# Patient Record
Sex: MALE | Race: OTHER | HISPANIC OR LATINO
[De-identification: names, ages, dates, MRNs, and addresses within clinical notes are randomized per-mention and may not be internally consistent; named-entity substitution may affect disease eponyms.]

---

## 2017-01-04 ENCOUNTER — APPOINTMENT (OUTPATIENT)
Dept: CARDIOLOGY | Facility: CLINIC | Age: 62
End: 2017-01-04

## 2017-01-25 ENCOUNTER — APPOINTMENT (OUTPATIENT)
Dept: CARDIOLOGY | Facility: CLINIC | Age: 62
End: 2017-01-25

## 2017-01-25 ENCOUNTER — OUTPATIENT (OUTPATIENT)
Dept: OUTPATIENT SERVICES | Facility: HOSPITAL | Age: 62
LOS: 1 days | End: 2017-01-25
Payer: MEDICAID

## 2017-01-25 DIAGNOSIS — Z00.8 ENCOUNTER FOR OTHER GENERAL EXAMINATION: ICD-10-CM

## 2017-01-25 PROCEDURE — A9500: CPT

## 2017-01-25 PROCEDURE — 93018 CV STRESS TEST I&R ONLY: CPT

## 2017-01-25 PROCEDURE — 78452 HT MUSCLE IMAGE SPECT MULT: CPT | Mod: 26

## 2017-01-25 PROCEDURE — 78452 HT MUSCLE IMAGE SPECT MULT: CPT

## 2017-01-25 PROCEDURE — 93016 CV STRESS TEST SUPVJ ONLY: CPT

## 2017-01-25 PROCEDURE — 93017 CV STRESS TEST TRACING ONLY: CPT

## 2017-01-31 ENCOUNTER — APPOINTMENT (OUTPATIENT)
Dept: INTERNAL MEDICINE | Facility: CLINIC | Age: 62
End: 2017-01-31

## 2017-01-31 VITALS
HEART RATE: 79 BPM | HEIGHT: 68 IN | BODY MASS INDEX: 33.95 KG/M2 | RESPIRATION RATE: 18 BRPM | DIASTOLIC BLOOD PRESSURE: 80 MMHG | SYSTOLIC BLOOD PRESSURE: 130 MMHG | TEMPERATURE: 97.8 F | WEIGHT: 224 LBS | OXYGEN SATURATION: 98 %

## 2017-01-31 DIAGNOSIS — M79.604 PAIN IN RIGHT LEG: ICD-10-CM

## 2017-01-31 DIAGNOSIS — M79.605 PAIN IN RIGHT LEG: ICD-10-CM

## 2017-01-31 DIAGNOSIS — R60.0 LOCALIZED EDEMA: ICD-10-CM

## 2017-01-31 DIAGNOSIS — Z01.818 ENCOUNTER FOR OTHER PREPROCEDURAL EXAMINATION: ICD-10-CM

## 2017-01-31 DIAGNOSIS — I73.9 PERIPHERAL VASCULAR DISEASE, UNSPECIFIED: ICD-10-CM

## 2017-01-31 RX ORDER — PERPHENAZINE 2 MG
TABLET ORAL
Refills: 0 | Status: ACTIVE | COMMUNITY

## 2017-02-02 LAB
25(OH)D3 SERPL-MCNC: 27.4 NG/ML
ALBUMIN SERPL ELPH-MCNC: 4.7 G/DL
ALP BLD-CCNC: 69 U/L
ALT SERPL-CCNC: 19 U/L
ANION GAP SERPL CALC-SCNC: 13 MMOL/L
APPEARANCE: CLEAR
AST SERPL-CCNC: 21 U/L
BASOPHILS # BLD AUTO: 0.02 K/UL
BASOPHILS NFR BLD AUTO: 0.5 %
BILIRUB SERPL-MCNC: 0.4 MG/DL
BILIRUBIN URINE: NEGATIVE
BLOOD URINE: NEGATIVE
BUN SERPL-MCNC: 23 MG/DL
CALCIUM SERPL-MCNC: 9.1 MG/DL
CHLORIDE SERPL-SCNC: 100 MMOL/L
CHOLEST SERPL-MCNC: 169 MG/DL
CHOLEST/HDLC SERPL: 3.7 RATIO
CO2 SERPL-SCNC: 27 MMOL/L
COLOR: YELLOW
CREAT SERPL-MCNC: 0.82 MG/DL
CREAT SPEC-SCNC: 161 MG/DL
EOSINOPHIL # BLD AUTO: 0.18 K/UL
EOSINOPHIL NFR BLD AUTO: 4.1 %
ERYTHROCYTE [SEDIMENTATION RATE] IN BLOOD BY WESTERGREN METHOD: 13 MM/HR
FOLATE SERPL-MCNC: 13.2 NG/ML
GGT SERPL-CCNC: 14 U/L
GLUCOSE QUALITATIVE U: NORMAL MG/DL
GLUCOSE SERPL-MCNC: 127 MG/DL
HBA1C MFR BLD HPLC: 5.9 %
HCT VFR BLD CALC: 37 %
HDLC SERPL-MCNC: 46 MG/DL
HGB BLD-MCNC: 12.1 G/DL
IMM GRANULOCYTES NFR BLD AUTO: 0.2 %
IRON SATN MFR SERPL: 26 %
IRON SERPL-MCNC: 99 UG/DL
KETONES URINE: NEGATIVE
LDLC SERPL CALC-MCNC: 102 MG/DL
LEUKOCYTE ESTERASE URINE: NEGATIVE
LYMPHOCYTES # BLD AUTO: 1.54 K/UL
LYMPHOCYTES NFR BLD AUTO: 34.8 %
MAN DIFF?: NORMAL
MCHC RBC-ENTMCNC: 28.5 PG
MCHC RBC-ENTMCNC: 32.7 GM/DL
MCV RBC AUTO: 87.3 FL
MICROALBUMIN 24H UR DL<=1MG/L-MCNC: 2.3 MG/DL
MICROALBUMIN/CREAT 24H UR-RTO: 14 UG/MG
MONOCYTES # BLD AUTO: 0.39 K/UL
MONOCYTES NFR BLD AUTO: 8.8 %
NEUTROPHILS # BLD AUTO: 2.28 K/UL
NEUTROPHILS NFR BLD AUTO: 51.6 %
NITRITE URINE: NEGATIVE
PH URINE: 5.5
PLATELET # BLD AUTO: 251 K/UL
POTASSIUM SERPL-SCNC: 3.5 MMOL/L
PROT SERPL-MCNC: 7.3 G/DL
PROTEIN URINE: NEGATIVE MG/DL
PSA FREE FLD-MCNC: 27.2 %
PSA FREE SERPL-MCNC: 0.24 NG/ML
PSA SERPL-MCNC: 0.9 NG/ML
RBC # BLD: 4.24 M/UL
RBC # FLD: 14.4 %
SODIUM SERPL-SCNC: 140 MMOL/L
SPECIFIC GRAVITY URINE: 1.03
T3 SERPL-MCNC: 136 NG/DL
T4 FREE SERPL-MCNC: 1.1 NG/DL
TIBC SERPL-MCNC: 379 UG/DL
TRIGL SERPL-MCNC: 105 MG/DL
TSH SERPL-ACNC: 0.54 UIU/ML
UIBC SERPL-MCNC: 280 UG/DL
UROBILINOGEN URINE: NORMAL MG/DL
VIT B12 SERPL-MCNC: 385 PG/ML
WBC # FLD AUTO: 4.42 K/UL

## 2017-02-14 ENCOUNTER — RX RENEWAL (OUTPATIENT)
Age: 62
End: 2017-02-14

## 2017-02-14 LAB — HEMOCCULT STL QL IA: NEGATIVE

## 2017-02-21 ENCOUNTER — RX RENEWAL (OUTPATIENT)
Age: 62
End: 2017-02-21

## 2017-03-07 ENCOUNTER — APPOINTMENT (OUTPATIENT)
Dept: CARDIOLOGY | Facility: CLINIC | Age: 62
End: 2017-03-07

## 2017-05-25 ENCOUNTER — RX RENEWAL (OUTPATIENT)
Age: 62
End: 2017-05-25

## 2017-06-06 ENCOUNTER — APPOINTMENT (OUTPATIENT)
Dept: INTERNAL MEDICINE | Facility: CLINIC | Age: 62
End: 2017-06-06

## 2017-06-06 VITALS
RESPIRATION RATE: 18 BRPM | SYSTOLIC BLOOD PRESSURE: 130 MMHG | TEMPERATURE: 98.1 F | HEIGHT: 67.5 IN | HEART RATE: 77 BPM | DIASTOLIC BLOOD PRESSURE: 82 MMHG | OXYGEN SATURATION: 98 % | WEIGHT: 228 LBS | BODY MASS INDEX: 35.37 KG/M2

## 2017-07-09 ENCOUNTER — MOBILE ON CALL (OUTPATIENT)
Age: 62
End: 2017-07-09

## 2017-07-10 ENCOUNTER — RX RENEWAL (OUTPATIENT)
Age: 62
End: 2017-07-10

## 2017-07-26 ENCOUNTER — MOBILE ON CALL (OUTPATIENT)
Age: 62
End: 2017-07-26

## 2017-07-27 ENCOUNTER — CLINICAL ADVICE (OUTPATIENT)
Age: 62
End: 2017-07-27

## 2017-07-27 DIAGNOSIS — L73.9 FOLLICULAR DISORDER, UNSPECIFIED: ICD-10-CM

## 2017-08-02 LAB
ALBUMIN SERPL ELPH-MCNC: 4.8 G/DL
ALP BLD-CCNC: 79 U/L
ALT SERPL-CCNC: 26 U/L
ANION GAP SERPL CALC-SCNC: 16 MMOL/L
AST SERPL-CCNC: 25 U/L
BASOPHILS # BLD AUTO: 0.02 K/UL
BASOPHILS NFR BLD AUTO: 0.3 %
BILIRUB SERPL-MCNC: 0.3 MG/DL
BUN SERPL-MCNC: 23 MG/DL
CALCIUM SERPL-MCNC: 9.8 MG/DL
CHLORIDE SERPL-SCNC: 100 MMOL/L
CHOLEST SERPL-MCNC: 163 MG/DL
CHOLEST/HDLC SERPL: 3.6 RATIO
CO2 SERPL-SCNC: 25 MMOL/L
CREAT SERPL-MCNC: 1.23 MG/DL
EOSINOPHIL # BLD AUTO: 0.16 K/UL
EOSINOPHIL NFR BLD AUTO: 2.8 %
GGT SERPL-CCNC: 14 U/L
GLUCOSE SERPL-MCNC: 145 MG/DL
HBA1C MFR BLD HPLC: 5.5 %
HCT VFR BLD CALC: 37.1 %
HDLC SERPL-MCNC: 45 MG/DL
HGB BLD-MCNC: 12.2 G/DL
IMM GRANULOCYTES NFR BLD AUTO: 0.5 %
LDLC SERPL CALC-MCNC: 94 MG/DL
LYMPHOCYTES # BLD AUTO: 2.31 K/UL
LYMPHOCYTES NFR BLD AUTO: 40.2 %
MAN DIFF?: NORMAL
MCHC RBC-ENTMCNC: 29.9 PG
MCHC RBC-ENTMCNC: 32.9 GM/DL
MCV RBC AUTO: 90.9 FL
MONOCYTES # BLD AUTO: 0.48 K/UL
MONOCYTES NFR BLD AUTO: 8.3 %
NEUTROPHILS # BLD AUTO: 2.75 K/UL
NEUTROPHILS NFR BLD AUTO: 47.9 %
PLATELET # BLD AUTO: 261 K/UL
POTASSIUM SERPL-SCNC: 4.1 MMOL/L
PROT SERPL-MCNC: 7.3 G/DL
RBC # BLD: 4.08 M/UL
RBC # FLD: 14 %
SODIUM SERPL-SCNC: 141 MMOL/L
TRIGL SERPL-MCNC: 119 MG/DL
WBC # FLD AUTO: 5.75 K/UL

## 2017-08-07 LAB
TESTOST BND SERPL-MCNC: 8.4 PG/ML
TESTOST SERPL-MCNC: 231 NG/DL

## 2017-08-09 ENCOUNTER — MOBILE ON CALL (OUTPATIENT)
Age: 62
End: 2017-08-09

## 2017-09-07 ENCOUNTER — APPOINTMENT (OUTPATIENT)
Dept: INTERNAL MEDICINE | Facility: CLINIC | Age: 62
End: 2017-09-07
Payer: MEDICAID

## 2017-09-07 VITALS
DIASTOLIC BLOOD PRESSURE: 80 MMHG | OXYGEN SATURATION: 98 % | TEMPERATURE: 98.1 F | HEIGHT: 67.5 IN | HEART RATE: 68 BPM | BODY MASS INDEX: 34.13 KG/M2 | RESPIRATION RATE: 16 BRPM | WEIGHT: 220 LBS | SYSTOLIC BLOOD PRESSURE: 120 MMHG

## 2017-09-07 PROCEDURE — G0008: CPT

## 2017-09-07 PROCEDURE — 90686 IIV4 VACC NO PRSV 0.5 ML IM: CPT

## 2017-09-07 PROCEDURE — 99213 OFFICE O/P EST LOW 20 MIN: CPT | Mod: 25

## 2017-09-21 ENCOUNTER — APPOINTMENT (OUTPATIENT)
Dept: ENDOCRINOLOGY | Facility: CLINIC | Age: 62
End: 2017-09-21

## 2017-10-19 ENCOUNTER — RX RENEWAL (OUTPATIENT)
Age: 62
End: 2017-10-19

## 2017-10-28 ENCOUNTER — EMERGENCY (EMERGENCY)
Facility: HOSPITAL | Age: 62
LOS: 1 days | Discharge: HOME | End: 2017-10-28
Attending: EMERGENCY MEDICINE
Payer: MEDICAID

## 2017-10-28 VITALS
TEMPERATURE: 98 F | SYSTOLIC BLOOD PRESSURE: 161 MMHG | WEIGHT: 210.1 LBS | RESPIRATION RATE: 18 BRPM | HEART RATE: 66 BPM | DIASTOLIC BLOOD PRESSURE: 96 MMHG | HEIGHT: 68 IN | OXYGEN SATURATION: 98 %

## 2017-10-28 VITALS
SYSTOLIC BLOOD PRESSURE: 152 MMHG | TEMPERATURE: 97 F | HEART RATE: 57 BPM | DIASTOLIC BLOOD PRESSURE: 83 MMHG | OXYGEN SATURATION: 98 % | RESPIRATION RATE: 18 BRPM

## 2017-10-28 LAB
ALBUMIN SERPL ELPH-MCNC: 4 G/DL — SIGNIFICANT CHANGE UP (ref 3.5–5)
ALP SERPL-CCNC: 67 U/L — SIGNIFICANT CHANGE UP (ref 40–120)
ALT FLD-CCNC: 28 U/L DA — SIGNIFICANT CHANGE UP (ref 10–60)
ANION GAP SERPL CALC-SCNC: 7 MMOL/L — SIGNIFICANT CHANGE UP (ref 5–17)
AST SERPL-CCNC: 19 U/L — SIGNIFICANT CHANGE UP (ref 10–40)
BASOPHILS # BLD AUTO: 0.1 K/UL — SIGNIFICANT CHANGE UP (ref 0–0.2)
BASOPHILS NFR BLD AUTO: 1.2 % — SIGNIFICANT CHANGE UP (ref 0–2)
BILIRUB SERPL-MCNC: 0.3 MG/DL — SIGNIFICANT CHANGE UP (ref 0.2–1.2)
BUN SERPL-MCNC: 21 MG/DL — HIGH (ref 7–18)
CALCIUM SERPL-MCNC: 8.9 MG/DL — SIGNIFICANT CHANGE UP (ref 8.4–10.5)
CHLORIDE SERPL-SCNC: 103 MMOL/L — SIGNIFICANT CHANGE UP (ref 96–108)
CO2 SERPL-SCNC: 28 MMOL/L — SIGNIFICANT CHANGE UP (ref 22–31)
CREAT SERPL-MCNC: 0.99 MG/DL — SIGNIFICANT CHANGE UP (ref 0.5–1.3)
EOSINOPHIL # BLD AUTO: 0.2 K/UL — SIGNIFICANT CHANGE UP (ref 0–0.5)
EOSINOPHIL NFR BLD AUTO: 2.7 % — SIGNIFICANT CHANGE UP (ref 0–6)
GLUCOSE SERPL-MCNC: 154 MG/DL — HIGH (ref 70–99)
HCT VFR BLD CALC: 35.6 % — LOW (ref 39–50)
HGB BLD-MCNC: 12.1 G/DL — LOW (ref 13–17)
LIDOCAIN IGE QN: 157 U/L — SIGNIFICANT CHANGE UP (ref 73–393)
LYMPHOCYTES # BLD AUTO: 2 K/UL — SIGNIFICANT CHANGE UP (ref 1–3.3)
LYMPHOCYTES # BLD AUTO: 34.6 % — SIGNIFICANT CHANGE UP (ref 13–44)
MCHC RBC-ENTMCNC: 31 PG — SIGNIFICANT CHANGE UP (ref 27–34)
MCHC RBC-ENTMCNC: 34.1 GM/DL — SIGNIFICANT CHANGE UP (ref 32–36)
MCV RBC AUTO: 91.2 FL — SIGNIFICANT CHANGE UP (ref 80–100)
MONOCYTES # BLD AUTO: 0.4 K/UL — SIGNIFICANT CHANGE UP (ref 0–0.9)
MONOCYTES NFR BLD AUTO: 7.1 % — SIGNIFICANT CHANGE UP (ref 2–14)
NEUTROPHILS # BLD AUTO: 3.2 K/UL — SIGNIFICANT CHANGE UP (ref 1.8–7.4)
NEUTROPHILS NFR BLD AUTO: 54.3 % — SIGNIFICANT CHANGE UP (ref 43–77)
PLATELET # BLD AUTO: 252 K/UL — SIGNIFICANT CHANGE UP (ref 150–400)
POTASSIUM SERPL-MCNC: 3.4 MMOL/L — LOW (ref 3.5–5.3)
POTASSIUM SERPL-SCNC: 3.4 MMOL/L — LOW (ref 3.5–5.3)
PROT SERPL-MCNC: 7.5 G/DL — SIGNIFICANT CHANGE UP (ref 6–8.3)
RBC # BLD: 3.91 M/UL — LOW (ref 4.2–5.8)
RBC # FLD: 12.3 % — SIGNIFICANT CHANGE UP (ref 10.3–14.5)
SODIUM SERPL-SCNC: 138 MMOL/L — SIGNIFICANT CHANGE UP (ref 135–145)
WBC # BLD: 5.9 K/UL — SIGNIFICANT CHANGE UP (ref 3.8–10.5)
WBC # FLD AUTO: 5.9 K/UL — SIGNIFICANT CHANGE UP (ref 3.8–10.5)

## 2017-10-28 PROCEDURE — 71020: CPT | Mod: 26

## 2017-10-28 PROCEDURE — 99284 EMERGENCY DEPT VISIT MOD MDM: CPT | Mod: 25

## 2017-10-28 PROCEDURE — 93005 ELECTROCARDIOGRAM TRACING: CPT

## 2017-10-28 PROCEDURE — 96374 THER/PROPH/DIAG INJ IV PUSH: CPT

## 2017-10-28 PROCEDURE — 80053 COMPREHEN METABOLIC PANEL: CPT

## 2017-10-28 PROCEDURE — 85027 COMPLETE CBC AUTOMATED: CPT

## 2017-10-28 PROCEDURE — 99285 EMERGENCY DEPT VISIT HI MDM: CPT | Mod: 25

## 2017-10-28 PROCEDURE — 83690 ASSAY OF LIPASE: CPT

## 2017-10-28 PROCEDURE — 96375 TX/PRO/DX INJ NEW DRUG ADDON: CPT

## 2017-10-28 PROCEDURE — 71046 X-RAY EXAM CHEST 2 VIEWS: CPT

## 2017-10-28 PROCEDURE — 76705 ECHO EXAM OF ABDOMEN: CPT

## 2017-10-28 PROCEDURE — 76705 ECHO EXAM OF ABDOMEN: CPT | Mod: 26

## 2017-10-28 RX ORDER — FAMOTIDINE 10 MG/ML
20 INJECTION INTRAVENOUS ONCE
Qty: 0 | Refills: 0 | Status: COMPLETED | OUTPATIENT
Start: 2017-10-28 | End: 2017-10-28

## 2017-10-28 RX ORDER — KETOROLAC TROMETHAMINE 30 MG/ML
30 SYRINGE (ML) INJECTION ONCE
Qty: 0 | Refills: 0 | Status: DISCONTINUED | OUTPATIENT
Start: 2017-10-28 | End: 2017-10-28

## 2017-10-28 RX ORDER — SODIUM CHLORIDE 9 MG/ML
1000 INJECTION INTRAMUSCULAR; INTRAVENOUS; SUBCUTANEOUS ONCE
Qty: 0 | Refills: 0 | Status: COMPLETED | OUTPATIENT
Start: 2017-10-28 | End: 2017-10-28

## 2017-10-28 RX ADMIN — Medication 30 MILLIGRAM(S): at 11:22

## 2017-10-28 RX ADMIN — SODIUM CHLORIDE 1000 MILLILITER(S): 9 INJECTION INTRAMUSCULAR; INTRAVENOUS; SUBCUTANEOUS at 07:25

## 2017-10-28 RX ADMIN — FAMOTIDINE 20 MILLIGRAM(S): 10 INJECTION INTRAVENOUS at 07:35

## 2017-10-28 NOTE — ED PROVIDER NOTE - OBJECTIVE STATEMENT
62 M with hx of gallstones complaining of epigastric abdominal pain.  Patient denies fever/chills, no vomiting/nausea.  Patient states that he also feels "gassy."  No diarrhea.  No hx of abdominal surgeries.  No chest pain, no shortness of breath.

## 2017-10-28 NOTE — ED PROVIDER NOTE - MEDICAL DECISION MAKING DETAILS
well appearing, will check labs, hydrate, minimal epigastric tenderness, no RUQ tenderness, will DC if labs negative.

## 2017-10-28 NOTE — ED PROVIDER NOTE - PROGRESS NOTE DETAILS
Pt improved, tolerating po, will dc to follow up with surgery outpatient. Precautions reviewed and pt advised to change his diet.

## 2017-10-31 ENCOUNTER — APPOINTMENT (OUTPATIENT)
Dept: UROLOGY | Facility: CLINIC | Age: 62
End: 2017-10-31
Payer: MEDICAID

## 2017-10-31 VITALS
HEIGHT: 67 IN | SYSTOLIC BLOOD PRESSURE: 160 MMHG | BODY MASS INDEX: 34.53 KG/M2 | RESPIRATION RATE: 16 BRPM | HEART RATE: 67 BPM | DIASTOLIC BLOOD PRESSURE: 92 MMHG | WEIGHT: 220 LBS | TEMPERATURE: 97.9 F

## 2017-10-31 DIAGNOSIS — F15.90 OTHER STIMULANT USE, UNSPECIFIED, UNCOMPLICATED: ICD-10-CM

## 2017-10-31 DIAGNOSIS — Z80.0 FAMILY HISTORY OF MALIGNANT NEOPLASM OF DIGESTIVE ORGANS: ICD-10-CM

## 2017-10-31 DIAGNOSIS — Z82.49 FAMILY HISTORY OF ISCHEMIC HEART DISEASE AND OTHER DISEASES OF THE CIRCULATORY SYSTEM: ICD-10-CM

## 2017-10-31 DIAGNOSIS — Z78.9 OTHER SPECIFIED HEALTH STATUS: ICD-10-CM

## 2017-10-31 DIAGNOSIS — Z80.9 FAMILY HISTORY OF MALIGNANT NEOPLASM, UNSPECIFIED: ICD-10-CM

## 2017-10-31 PROCEDURE — 99204 OFFICE O/P NEW MOD 45 MIN: CPT

## 2017-11-01 DIAGNOSIS — I10 ESSENTIAL (PRIMARY) HYPERTENSION: ICD-10-CM

## 2017-11-01 DIAGNOSIS — K80.20 CALCULUS OF GALLBLADDER WITHOUT CHOLECYSTITIS WITHOUT OBSTRUCTION: ICD-10-CM

## 2017-11-01 DIAGNOSIS — Z79.82 LONG TERM (CURRENT) USE OF ASPIRIN: ICD-10-CM

## 2017-11-01 DIAGNOSIS — E78.00 PURE HYPERCHOLESTEROLEMIA, UNSPECIFIED: ICD-10-CM

## 2017-11-08 ENCOUNTER — MOBILE ON CALL (OUTPATIENT)
Age: 62
End: 2017-11-08

## 2017-11-08 ENCOUNTER — CLINICAL ADVICE (OUTPATIENT)
Age: 62
End: 2017-11-08

## 2017-11-08 LAB
TESTOST BND SERPL-MCNC: 5.2 PG/ML
TESTOST SERPL-MCNC: 210.4 NG/DL

## 2017-11-27 ENCOUNTER — RX RENEWAL (OUTPATIENT)
Age: 62
End: 2017-11-27

## 2017-11-28 ENCOUNTER — APPOINTMENT (OUTPATIENT)
Dept: UROLOGY | Facility: CLINIC | Age: 62
End: 2017-11-28
Payer: MEDICAID

## 2017-11-28 VITALS
BODY MASS INDEX: 34.28 KG/M2 | HEIGHT: 67.5 IN | RESPIRATION RATE: 18 BRPM | TEMPERATURE: 97.8 F | DIASTOLIC BLOOD PRESSURE: 64 MMHG | WEIGHT: 221 LBS | SYSTOLIC BLOOD PRESSURE: 138 MMHG | HEART RATE: 69 BPM

## 2017-11-28 PROCEDURE — 99215 OFFICE O/P EST HI 40 MIN: CPT

## 2017-11-29 ENCOUNTER — OUTPATIENT (OUTPATIENT)
Dept: OUTPATIENT SERVICES | Facility: HOSPITAL | Age: 62
LOS: 1 days | End: 2017-11-29
Payer: MEDICAID

## 2017-11-29 ENCOUNTER — APPOINTMENT (OUTPATIENT)
Dept: CARDIOLOGY | Facility: CLINIC | Age: 62
End: 2017-11-29
Payer: MEDICAID

## 2017-11-29 VITALS
BODY MASS INDEX: 34.28 KG/M2 | SYSTOLIC BLOOD PRESSURE: 140 MMHG | HEIGHT: 67.5 IN | WEIGHT: 221 LBS | HEART RATE: 88 BPM | RESPIRATION RATE: 16 BRPM | DIASTOLIC BLOOD PRESSURE: 78 MMHG | OXYGEN SATURATION: 96 %

## 2017-11-29 DIAGNOSIS — Z00.8 ENCOUNTER FOR OTHER GENERAL EXAMINATION: ICD-10-CM

## 2017-11-29 PROCEDURE — 93010 ELECTROCARDIOGRAM REPORT: CPT

## 2017-11-29 PROCEDURE — G0463: CPT | Mod: 25

## 2017-11-29 PROCEDURE — ZZZZZ: CPT

## 2017-11-29 PROCEDURE — 93005 ELECTROCARDIOGRAM TRACING: CPT

## 2017-12-04 ENCOUNTER — APPOINTMENT (OUTPATIENT)
Dept: SURGERY | Facility: CLINIC | Age: 62
End: 2017-12-04
Payer: MEDICAID

## 2017-12-04 DIAGNOSIS — R07.9 CHEST PAIN, UNSPECIFIED: ICD-10-CM

## 2017-12-04 DIAGNOSIS — E78.00 PURE HYPERCHOLESTEROLEMIA, UNSPECIFIED: ICD-10-CM

## 2017-12-04 DIAGNOSIS — I10 ESSENTIAL (PRIMARY) HYPERTENSION: ICD-10-CM

## 2017-12-04 DIAGNOSIS — R60.0 LOCALIZED EDEMA: ICD-10-CM

## 2017-12-04 DIAGNOSIS — I73.9 PERIPHERAL VASCULAR DISEASE, UNSPECIFIED: ICD-10-CM

## 2017-12-14 ENCOUNTER — APPOINTMENT (OUTPATIENT)
Dept: SURGERY | Facility: CLINIC | Age: 62
End: 2017-12-14
Payer: MEDICAID

## 2017-12-14 VITALS
SYSTOLIC BLOOD PRESSURE: 165 MMHG | WEIGHT: 223 LBS | HEART RATE: 68 BPM | OXYGEN SATURATION: 98 % | DIASTOLIC BLOOD PRESSURE: 90 MMHG | HEIGHT: 68 IN | BODY MASS INDEX: 33.8 KG/M2 | TEMPERATURE: 98.8 F

## 2017-12-14 PROCEDURE — 99203 OFFICE O/P NEW LOW 30 MIN: CPT

## 2017-12-20 ENCOUNTER — OTHER (OUTPATIENT)
Age: 62
End: 2017-12-20

## 2017-12-23 ENCOUNTER — RX RENEWAL (OUTPATIENT)
Age: 62
End: 2017-12-23

## 2017-12-27 ENCOUNTER — APPOINTMENT (OUTPATIENT)
Dept: CARDIOLOGY | Facility: CLINIC | Age: 62
End: 2017-12-27

## 2017-12-27 ENCOUNTER — OUTPATIENT (OUTPATIENT)
Dept: OUTPATIENT SERVICES | Facility: HOSPITAL | Age: 62
LOS: 1 days | End: 2017-12-27
Payer: MEDICAID

## 2017-12-27 DIAGNOSIS — Z00.8 ENCOUNTER FOR OTHER GENERAL EXAMINATION: ICD-10-CM

## 2017-12-27 PROCEDURE — 93306 TTE W/DOPPLER COMPLETE: CPT | Mod: 26

## 2017-12-27 PROCEDURE — 93306 TTE W/DOPPLER COMPLETE: CPT

## 2017-12-28 ENCOUNTER — APPOINTMENT (OUTPATIENT)
Dept: INTERNAL MEDICINE | Facility: CLINIC | Age: 62
End: 2017-12-28
Payer: MEDICAID

## 2017-12-28 VITALS
WEIGHT: 223 LBS | BODY MASS INDEX: 35 KG/M2 | HEIGHT: 67 IN | DIASTOLIC BLOOD PRESSURE: 80 MMHG | OXYGEN SATURATION: 96 % | RESPIRATION RATE: 18 BRPM | TEMPERATURE: 97.8 F | HEART RATE: 78 BPM | SYSTOLIC BLOOD PRESSURE: 140 MMHG

## 2017-12-28 PROCEDURE — 99214 OFFICE O/P EST MOD 30 MIN: CPT

## 2017-12-28 RX ORDER — AMOXICILLIN AND CLAVULANATE POTASSIUM 500; 125 MG/1; MG/1
500-125 TABLET, FILM COATED ORAL TWICE DAILY
Qty: 14 | Refills: 0 | Status: DISCONTINUED | COMMUNITY
Start: 2017-07-27 | End: 2017-12-28

## 2017-12-28 RX ORDER — DICYCLOMINE HYDROCHLORIDE 20 MG/1
20 TABLET ORAL
Qty: 60 | Refills: 0 | Status: DISCONTINUED | COMMUNITY
Start: 2017-10-31 | End: 2017-12-28

## 2017-12-29 ENCOUNTER — OUTPATIENT (OUTPATIENT)
Dept: OUTPATIENT SERVICES | Facility: HOSPITAL | Age: 62
LOS: 1 days | End: 2017-12-29
Payer: MEDICAID

## 2017-12-29 VITALS
HEIGHT: 68 IN | RESPIRATION RATE: 18 BRPM | DIASTOLIC BLOOD PRESSURE: 79 MMHG | WEIGHT: 223.99 LBS | HEART RATE: 66 BPM | SYSTOLIC BLOOD PRESSURE: 149 MMHG | OXYGEN SATURATION: 99 % | TEMPERATURE: 98 F

## 2017-12-29 DIAGNOSIS — K42.9 UMBILICAL HERNIA WITHOUT OBSTRUCTION OR GANGRENE: ICD-10-CM

## 2017-12-29 DIAGNOSIS — K60.3 ANAL FISTULA: Chronic | ICD-10-CM

## 2017-12-29 DIAGNOSIS — K80.20 CALCULUS OF GALLBLADDER WITHOUT CHOLECYSTITIS WITHOUT OBSTRUCTION: ICD-10-CM

## 2017-12-29 DIAGNOSIS — Z01.818 ENCOUNTER FOR OTHER PREPROCEDURAL EXAMINATION: ICD-10-CM

## 2017-12-29 DIAGNOSIS — Z96.642 PRESENCE OF LEFT ARTIFICIAL HIP JOINT: Chronic | ICD-10-CM

## 2017-12-29 DIAGNOSIS — Z98.890 OTHER SPECIFIED POSTPROCEDURAL STATES: Chronic | ICD-10-CM

## 2017-12-29 LAB
ABO + RH PNL BLD: NORMAL
ALBUMIN SERPL ELPH-MCNC: 4.7 G/DL
ALP BLD-CCNC: 73 U/L
ALT SERPL-CCNC: 40 U/L
ANION GAP SERPL CALC-SCNC: 17 MMOL/L
APPEARANCE: CLEAR
APTT BLD: 37.8 SEC
AST SERPL-CCNC: 28 U/L
BACTERIA UR CULT: NORMAL
BASOPHILS # BLD AUTO: 0.02 K/UL
BASOPHILS NFR BLD AUTO: 0.3 %
BILIRUB SERPL-MCNC: 0.3 MG/DL
BILIRUBIN URINE: NEGATIVE
BLD GP AB SCN SERPL QL: SIGNIFICANT CHANGE UP
BLOOD URINE: NEGATIVE
BUN SERPL-MCNC: 22 MG/DL
CALCIUM SERPL-MCNC: 9.3 MG/DL
CHLORIDE SERPL-SCNC: 98 MMOL/L
CHOLEST SERPL-MCNC: 184 MG/DL
CHOLEST/HDLC SERPL: 3.8 RATIO
CO2 SERPL-SCNC: 25 MMOL/L
COLOR: YELLOW
CREAT SERPL-MCNC: 0.97 MG/DL
EOSINOPHIL # BLD AUTO: 0.31 K/UL
EOSINOPHIL NFR BLD AUTO: 5.2 %
GGT SERPL-CCNC: 17 U/L
GLUCOSE QUALITATIVE U: NEGATIVE MG/DL
GLUCOSE SERPL-MCNC: 180 MG/DL
HBA1C MFR BLD HPLC: 5.6 %
HCT VFR BLD CALC: 36.5 %
HDLC SERPL-MCNC: 49 MG/DL
HGB BLD-MCNC: 12.2 G/DL
IMM GRANULOCYTES NFR BLD AUTO: 0.2 %
INR PPP: 1.01 RATIO
KETONES URINE: NEGATIVE
LDLC SERPL CALC-MCNC: 94 MG/DL
LEUKOCYTE ESTERASE URINE: NEGATIVE
LYMPHOCYTES # BLD AUTO: 1.39 K/UL
LYMPHOCYTES NFR BLD AUTO: 23.2 %
MAN DIFF?: NORMAL
MCHC RBC-ENTMCNC: 29.1 PG
MCHC RBC-ENTMCNC: 33.4 GM/DL
MCV RBC AUTO: 87.1 FL
MONOCYTES # BLD AUTO: 0.38 K/UL
MONOCYTES NFR BLD AUTO: 6.3 %
NEUTROPHILS # BLD AUTO: 3.88 K/UL
NEUTROPHILS NFR BLD AUTO: 64.8 %
NITRITE URINE: NEGATIVE
PH URINE: 5.5
PLATELET # BLD AUTO: 273 K/UL
POTASSIUM SERPL-SCNC: 3.5 MMOL/L
PROT SERPL-MCNC: 7.3 G/DL
PROTEIN URINE: NEGATIVE MG/DL
PT BLD: 11.4 SEC
RBC # BLD: 4.19 M/UL
RBC # FLD: 13.7 %
SODIUM SERPL-SCNC: 140 MMOL/L
SPECIFIC GRAVITY URINE: 1.02
TRIGL SERPL-MCNC: 204 MG/DL
UROBILINOGEN URINE: NEGATIVE MG/DL
WBC # FLD AUTO: 5.99 K/UL

## 2017-12-29 PROCEDURE — G0463: CPT

## 2017-12-29 PROCEDURE — 86901 BLOOD TYPING SEROLOGIC RH(D): CPT

## 2017-12-29 PROCEDURE — 86900 BLOOD TYPING SEROLOGIC ABO: CPT

## 2017-12-29 PROCEDURE — 86850 RBC ANTIBODY SCREEN: CPT

## 2017-12-29 RX ORDER — LABETALOL HCL 100 MG
0 TABLET ORAL
Qty: 0 | Refills: 0 | COMMUNITY

## 2017-12-29 RX ORDER — MELOXICAM 15 MG/1
0 TABLET ORAL
Qty: 0 | Refills: 0 | COMMUNITY

## 2017-12-29 RX ORDER — LISINOPRIL 2.5 MG/1
0 TABLET ORAL
Qty: 0 | Refills: 0 | COMMUNITY

## 2017-12-29 NOTE — H&P PST ADULT - FAMILY HISTORY
Sibling  Still living? Unknown  Family history of brain aneurysm, Age at diagnosis: Age Unknown Sibling  Still living? No  Family history of brain aneurysm, Age at diagnosis: Age Unknown  Family history of stomach cancer, Age at diagnosis: Age Unknown     Father  Still living? No  Family history of myocardial infarction, Age at diagnosis: Age Unknown  Family history of hypertension in father, Age at diagnosis: Age Unknown     Mother  Still living? No  Family history of stomach cancer, Age at diagnosis: Age Unknown  Family history of Alzheimer's disease, Age at diagnosis: Age Unknown

## 2017-12-29 NOTE — H&P PST ADULT - HISTORY OF PRESENT ILLNESS
62 yr old mmale with PMH of HTN, HLD, low testosterone, seasonal allergies, GERD presents with c/o right upper abdominal pain that worsens after ingestion of meals due to multiple gallstones 62 yr old male with PMH of HTN, HLD, low testosterone, seasonal allergies, GERD presents with c/o right upper abdominal pain that worsens after ingestion of meals due to multiple gallstones. Pt  also c/o umbilical hernia. Pt for laparoscopic cholecystectomy with intraoperative cholangiogram, possible open, umbilical hernia repair on 1/5/2018.

## 2017-12-29 NOTE — H&P PST ADULT - ASSESSMENT
62 yr old male with PMH of HTN, HLD, low testosterone, seasonal allergies, GERD presents with cholelithiasis and umbilical hernia. Pt for laparoscopic cholecystectomy with intraoperative cholangiogram, possible open, umbilical hernia repair on 1/5/2018. Pt is at moderate risk for procedure.

## 2017-12-29 NOTE — H&P PST ADULT - NEGATIVE CARDIOVASCULAR SYMPTOMS
no claudication/no palpitations/no chest pain/no dyspnea on exertion/no peripheral edema/no paroxysmal nocturnal dyspnea/no orthopnea

## 2017-12-29 NOTE — H&P PST ADULT - LAST ECHOCARDIOGRAM
12/27/17 EF65-70% Normal left ventricular function 12/27/17 EF 65-70% Normal left ventricular function

## 2017-12-29 NOTE — H&P PST ADULT - PROBLEM SELECTOR PLAN 5
Continue Omeprazole and take with sips of water preop on day of surgery. Follow-up with PCP postop for management.

## 2017-12-29 NOTE — H&P PST ADULT - PROBLEM SELECTOR PLAN 3
Continue antihypertensive meds and take with sips of water on day of surgery preop. Follow-up with PCP postop for BP management.

## 2017-12-29 NOTE — H&P PST ADULT - PSH
Anal fistula  h/o repair of anal fistula  History of hip replacement, total, left  9/2016  History of sinus surgery Anal fistula  h/o repair of anal fistula  History of hip replacement, total, left  9/2016  History of sinus surgery    S/P LASIK surgery of both eyes

## 2017-12-29 NOTE — H&P PST ADULT - PMH
Cholelithiasis    Depression    Gall bladder stones    High cholesterol    HLD (hyperlipidemia)    Hypertension    Low testosterone    Umbilical hernia Cholelithiasis    Depression    Gall bladder stones    High cholesterol    HLD (hyperlipidemia)    Hypertension    Low testosterone    Seasonal allergies    Umbilical hernia Cholelithiasis    Depression    Gall bladder stones    GERD (gastroesophageal reflux disease)    High cholesterol    HLD (hyperlipidemia)    Hypertension    Low testosterone    Seasonal allergies    Umbilical hernia

## 2017-12-29 NOTE — H&P PST ADULT - NSANTHOSAYNRD_GEN_A_CORE
No. CLAUDE screening performed.  STOP BANG Legend: 0-2 = LOW Risk; 3-4 = INTERMEDIATE Risk; 5-8 = HIGH Risk

## 2017-12-29 NOTE — H&P PST ADULT - RS GEN PE MLT RESP DETAILS PC
no intercostal retractions/no rales/good air movement/clear to auscultation bilaterally/respirations non-labored/no wheezes/no chest wall tenderness/no rhonchi/normal/airway patent/breath sounds equal/no subcutaneous emphysema

## 2018-01-03 ENCOUNTER — APPOINTMENT (OUTPATIENT)
Dept: UROLOGY | Facility: CLINIC | Age: 63
End: 2018-01-03
Payer: MEDICAID

## 2018-01-03 VITALS
OXYGEN SATURATION: 94 % | BODY MASS INDEX: 35.47 KG/M2 | DIASTOLIC BLOOD PRESSURE: 76 MMHG | RESPIRATION RATE: 18 BRPM | HEART RATE: 71 BPM | SYSTOLIC BLOOD PRESSURE: 128 MMHG | TEMPERATURE: 98 F | WEIGHT: 226 LBS | HEIGHT: 67 IN

## 2018-01-03 DIAGNOSIS — Z98.890 OTHER SPECIFIED POSTPROCEDURAL STATES: Chronic | ICD-10-CM

## 2018-01-03 DIAGNOSIS — K21.9 GASTRO-ESOPHAGEAL REFLUX DISEASE WITHOUT ESOPHAGITIS: ICD-10-CM

## 2018-01-03 DIAGNOSIS — E78.5 HYPERLIPIDEMIA, UNSPECIFIED: ICD-10-CM

## 2018-01-03 DIAGNOSIS — I10 ESSENTIAL (PRIMARY) HYPERTENSION: ICD-10-CM

## 2018-01-03 PROCEDURE — 99213 OFFICE O/P EST LOW 20 MIN: CPT

## 2018-01-05 ENCOUNTER — TRANSCRIPTION ENCOUNTER (OUTPATIENT)
Age: 63
End: 2018-01-05

## 2018-01-05 ENCOUNTER — OUTPATIENT (OUTPATIENT)
Dept: OUTPATIENT SERVICES | Facility: HOSPITAL | Age: 63
LOS: 1 days | End: 2018-01-05
Payer: MEDICAID

## 2018-01-05 ENCOUNTER — RESULT REVIEW (OUTPATIENT)
Age: 63
End: 2018-01-05

## 2018-01-05 VITALS
HEART RATE: 71 BPM | TEMPERATURE: 98 F | OXYGEN SATURATION: 97 % | RESPIRATION RATE: 18 BRPM | DIASTOLIC BLOOD PRESSURE: 70 MMHG | SYSTOLIC BLOOD PRESSURE: 136 MMHG

## 2018-01-05 VITALS
DIASTOLIC BLOOD PRESSURE: 89 MMHG | HEIGHT: 68 IN | RESPIRATION RATE: 16 BRPM | HEART RATE: 70 BPM | SYSTOLIC BLOOD PRESSURE: 159 MMHG | TEMPERATURE: 98 F | OXYGEN SATURATION: 98 % | WEIGHT: 223.99 LBS

## 2018-01-05 DIAGNOSIS — Z01.818 ENCOUNTER FOR OTHER PREPROCEDURAL EXAMINATION: ICD-10-CM

## 2018-01-05 DIAGNOSIS — K60.3 ANAL FISTULA: Chronic | ICD-10-CM

## 2018-01-05 DIAGNOSIS — Z96.642 PRESENCE OF LEFT ARTIFICIAL HIP JOINT: Chronic | ICD-10-CM

## 2018-01-05 DIAGNOSIS — K42.9 UMBILICAL HERNIA WITHOUT OBSTRUCTION OR GANGRENE: ICD-10-CM

## 2018-01-05 DIAGNOSIS — Z98.890 OTHER SPECIFIED POSTPROCEDURAL STATES: Chronic | ICD-10-CM

## 2018-01-05 LAB
BASOPHILS # BLD AUTO: 0.02 K/UL
BASOPHILS NFR BLD AUTO: 0.4 %
EOSINOPHIL # BLD AUTO: 0.28 K/UL
EOSINOPHIL NFR BLD AUTO: 5
GLUCOSE BLDC GLUCOMTR-MCNC: 144 MG/DL — HIGH (ref 70–99)
HCT VFR BLD CALC: 38.5 %
HGB BLD-MCNC: 12.8 G/DL
IMM GRANULOCYTES NFR BLD AUTO: 0.2 %
LYMPHOCYTES # BLD AUTO: 1.78 K/UL
LYMPHOCYTES NFR BLD AUTO: 31.8 %
MAN DIFF?: NORMAL
MCHC RBC-ENTMCNC: 29.6 PG
MCHC RBC-ENTMCNC: 33.2 GM/DL
MCV RBC AUTO: 89.1 FL
MONOCYTES # BLD AUTO: 0.48 K/UL
MONOCYTES NFR BLD AUTO: 8.6 %
NEUTROPHILS # BLD AUTO: 3.02 K/UL
NEUTROPHILS NFR BLD AUTO: 54 %
PLATELET # BLD AUTO: 279 K/UL
RBC # BLD: 4.32 M/UL
RBC # FLD: 13.6 %
WBC # FLD AUTO: 5.59 K/UL

## 2018-01-05 PROCEDURE — 49525 REPAIR ING HERNIA SLIDING: CPT

## 2018-01-05 PROCEDURE — 47563 LAPARO CHOLECYSTECTOMY/GRAPH: CPT

## 2018-01-05 PROCEDURE — 88304 TISSUE EXAM BY PATHOLOGIST: CPT | Mod: 26

## 2018-01-05 PROCEDURE — C1889: CPT

## 2018-01-05 PROCEDURE — 86850 RBC ANTIBODY SCREEN: CPT

## 2018-01-05 PROCEDURE — 86901 BLOOD TYPING SEROLOGIC RH(D): CPT

## 2018-01-05 PROCEDURE — 82962 GLUCOSE BLOOD TEST: CPT

## 2018-01-05 PROCEDURE — 47563 LAPARO CHOLECYSTECTOMY/GRAPH: CPT | Mod: AS

## 2018-01-05 PROCEDURE — 86900 BLOOD TYPING SEROLOGIC ABO: CPT

## 2018-01-05 PROCEDURE — 49585: CPT | Mod: AS

## 2018-01-05 PROCEDURE — 76000 FLUOROSCOPY <1 HR PHYS/QHP: CPT

## 2018-01-05 PROCEDURE — 49585: CPT

## 2018-01-05 RX ORDER — OXYCODONE AND ACETAMINOPHEN 5; 325 MG/1; MG/1
1 TABLET ORAL EVERY 4 HOURS
Qty: 0 | Refills: 0 | Status: DISCONTINUED | OUTPATIENT
Start: 2018-01-05 | End: 2018-01-05

## 2018-01-05 RX ORDER — ACETAMINOPHEN 500 MG
1000 TABLET ORAL ONCE
Qty: 0 | Refills: 0 | Status: COMPLETED | OUTPATIENT
Start: 2018-01-05 | End: 2018-01-05

## 2018-01-05 RX ORDER — HYDROMORPHONE HYDROCHLORIDE 2 MG/ML
0.5 INJECTION INTRAMUSCULAR; INTRAVENOUS; SUBCUTANEOUS
Qty: 0 | Refills: 0 | Status: DISCONTINUED | OUTPATIENT
Start: 2018-01-05 | End: 2018-01-05

## 2018-01-05 RX ORDER — SODIUM CHLORIDE 9 MG/ML
1000 INJECTION, SOLUTION INTRAVENOUS
Qty: 0 | Refills: 0 | Status: DISCONTINUED | OUTPATIENT
Start: 2018-01-05 | End: 2018-01-13

## 2018-01-05 RX ADMIN — HYDROMORPHONE HYDROCHLORIDE 0.5 MILLIGRAM(S): 2 INJECTION INTRAMUSCULAR; INTRAVENOUS; SUBCUTANEOUS at 11:29

## 2018-01-05 RX ADMIN — Medication 400 MILLIGRAM(S): at 11:00

## 2018-01-05 RX ADMIN — Medication 1000 MILLIGRAM(S): at 11:29

## 2018-01-05 RX ADMIN — HYDROMORPHONE HYDROCHLORIDE 0.5 MILLIGRAM(S): 2 INJECTION INTRAMUSCULAR; INTRAVENOUS; SUBCUTANEOUS at 11:30

## 2018-01-05 RX ADMIN — HYDROMORPHONE HYDROCHLORIDE 0.5 MILLIGRAM(S): 2 INJECTION INTRAMUSCULAR; INTRAVENOUS; SUBCUTANEOUS at 12:11

## 2018-01-05 RX ADMIN — HYDROMORPHONE HYDROCHLORIDE 0.5 MILLIGRAM(S): 2 INJECTION INTRAMUSCULAR; INTRAVENOUS; SUBCUTANEOUS at 11:13

## 2018-01-05 NOTE — ASU PATIENT PROFILE, ADULT - PMH
Cholelithiasis    Depression    Gall bladder stones    GERD (gastroesophageal reflux disease)    High cholesterol    HLD (hyperlipidemia)    Hypertension    Low testosterone    Seasonal allergies    Umbilical hernia

## 2018-01-05 NOTE — ASU PATIENT PROFILE, ADULT - PSH
Anal fistula  h/o repair of anal fistula  History of hip replacement, total, left  9/2016  History of sinus surgery    S/P LASIK surgery of both eyes

## 2018-01-05 NOTE — ASU DISCHARGE PLAN (ADULT/PEDIATRIC). - NURSING INSTRUCTIONS
keep dressing dry, clean, intact, for 2 days. After 2 days, remove dressing and leave steri strips on. You can shower with steri strips on. If it fall off after shower is OK, if not you leave it there, it will fall off by itself in 2-3 days. No heavy lifting or strenuous activity. Nothing heavier than 10 lbs for 1-2 weeks and nothing more than 30 lbs for 6 -8 weeks

## 2018-01-05 NOTE — ASU DISCHARGE PLAN (ADULT/PEDIATRIC). - MEDICATION SUMMARY - MEDICATIONS TO TAKE
I will START or STAY ON the medications listed below when I get home from the hospital:    Percocet 5/325 oral tablet  -- 1 tab(s) by mouth every 6 hours MDD:4  -- Caution federal law prohibits the transfer of this drug to any person other  than the person for whom it was prescribed.  May cause drowsiness.  Alcohol may intensify this effect.  Use care when operating dangerous machinery.  This prescription cannot be refilled.  This product contains acetaminophen.  Do not use  with any other product containing acetaminophen to prevent possible liver damage.  Using more of this medication than prescribed may cause serious breathing problems.    -- Indication: For pain    Aspirin Enteric Coated 81 mg oral delayed release tablet  -- 1 tab(s) by mouth once a day  -- Indication: For as previously perscribed    Tylenol 500 mg oral tablet  -- 2 tab(s) by mouth every 6 hours, As Needed  -- Indication: For as previously perscribed    Pepto-Bismol 262 mg/15 mL oral suspension  -- 30 milliliter(s) by mouth 4 times a day, As Needed  -- Indication: For as previously perscribed    gemfibrozil 600 mg oral tablet  -- 1 tab(s) by mouth 2 times a day  -- Indication: For as previously perscribed    lisinopril-hydroCHLOROthiazide 20 mg-25 mg oral tablet  -- 1 tab(s) by mouth once a day  -- Indication: For as previously perscribed    labetalol 100 mg oral tablet  -- 1 tab(s) by mouth 2 times a day  -- Indication: For as previously perscribed    dicyclomine 20 mg oral tablet  -- 1 tab(s) by mouth 4 times a day, As Needed  -- Indication: For as previously perscribed    Flonase 50 mcg/inh nasal spray  -- 1 spray(s) into nose once a day, As Needed  -- Indication: For as previously perscribed    omega-3 polyunsaturated fatty acids oral capsule  -- 1 tab(s) by mouth once a day  -- Indication: For as previously perscribed    omeprazole 40 mg oral delayed release capsule  -- 1 cap(s) by mouth once a day, As Needed  -- Indication: For as previously perscribed    testosterone 30 mg/actuation transdermal solution  -- 1 pump(s) by transdermal patch once a day to each axilla  -- Indication: For as previously perscribed    Vitamin C 1000 mg oral tablet  -- 1 tab(s) by mouth once a day  -- Indication: For as previously perscribed

## 2018-01-05 NOTE — BRIEF OPERATIVE NOTE - PROCEDURE
<<-----Click on this checkbox to enter Procedure Umbilical hernia repair  01/05/2018    Active  [email or web address]  Laparoscopic cholecystectomy w cholangiography  01/05/2018    Active  DPATERNOSTER

## 2018-01-08 LAB — SURGICAL PATHOLOGY FINAL REPORT - CH: SIGNIFICANT CHANGE UP

## 2018-01-09 LAB
TESTOST BND SERPL-MCNC: 6.4 PG/ML
TESTOST SERPL-MCNC: 200.2 NG/DL

## 2018-01-18 ENCOUNTER — APPOINTMENT (OUTPATIENT)
Dept: SURGERY | Facility: CLINIC | Age: 63
End: 2018-01-18

## 2018-01-22 ENCOUNTER — APPOINTMENT (OUTPATIENT)
Dept: SURGERY | Facility: CLINIC | Age: 63
End: 2018-01-22
Payer: MEDICAID

## 2018-01-22 VITALS
OXYGEN SATURATION: 98 % | HEIGHT: 67 IN | TEMPERATURE: 98 F | WEIGHT: 221 LBS | HEART RATE: 66 BPM | SYSTOLIC BLOOD PRESSURE: 150 MMHG | DIASTOLIC BLOOD PRESSURE: 92 MMHG | BODY MASS INDEX: 34.69 KG/M2

## 2018-01-22 DIAGNOSIS — Z87.19 PERSONAL HISTORY OF OTHER DISEASES OF THE DIGESTIVE SYSTEM: ICD-10-CM

## 2018-01-22 PROCEDURE — 99024 POSTOP FOLLOW-UP VISIT: CPT

## 2018-02-05 ENCOUNTER — RX RENEWAL (OUTPATIENT)
Age: 63
End: 2018-02-05

## 2018-02-28 ENCOUNTER — OUTPATIENT (OUTPATIENT)
Dept: OUTPATIENT SERVICES | Facility: HOSPITAL | Age: 63
LOS: 1 days | End: 2018-02-28
Payer: MEDICAID

## 2018-02-28 ENCOUNTER — APPOINTMENT (OUTPATIENT)
Dept: CARDIOLOGY | Facility: CLINIC | Age: 63
End: 2018-02-28
Payer: MEDICAID

## 2018-02-28 VITALS
RESPIRATION RATE: 16 BRPM | DIASTOLIC BLOOD PRESSURE: 90 MMHG | HEART RATE: 59 BPM | BODY MASS INDEX: 33.74 KG/M2 | WEIGHT: 215 LBS | HEIGHT: 67 IN | OXYGEN SATURATION: 97 % | SYSTOLIC BLOOD PRESSURE: 147 MMHG

## 2018-02-28 DIAGNOSIS — K60.3 ANAL FISTULA: Chronic | ICD-10-CM

## 2018-02-28 DIAGNOSIS — Z00.8 ENCOUNTER FOR OTHER GENERAL EXAMINATION: ICD-10-CM

## 2018-02-28 DIAGNOSIS — Z98.890 OTHER SPECIFIED POSTPROCEDURAL STATES: Chronic | ICD-10-CM

## 2018-02-28 DIAGNOSIS — Z96.642 PRESENCE OF LEFT ARTIFICIAL HIP JOINT: Chronic | ICD-10-CM

## 2018-02-28 PROCEDURE — 93010 ELECTROCARDIOGRAM REPORT: CPT

## 2018-02-28 PROCEDURE — G0463: CPT | Mod: 25

## 2018-02-28 PROCEDURE — 93005 ELECTROCARDIOGRAM TRACING: CPT

## 2018-02-28 PROCEDURE — ZZZZZ: CPT

## 2018-03-02 DIAGNOSIS — I10 ESSENTIAL (PRIMARY) HYPERTENSION: ICD-10-CM

## 2018-03-02 DIAGNOSIS — E78.00 PURE HYPERCHOLESTEROLEMIA, UNSPECIFIED: ICD-10-CM

## 2018-03-06 ENCOUNTER — APPOINTMENT (OUTPATIENT)
Dept: UROLOGY | Facility: CLINIC | Age: 63
End: 2018-03-06
Payer: MEDICAID

## 2018-03-06 VITALS
OXYGEN SATURATION: 97 % | HEART RATE: 60 BPM | SYSTOLIC BLOOD PRESSURE: 110 MMHG | TEMPERATURE: 97.9 F | DIASTOLIC BLOOD PRESSURE: 80 MMHG | RESPIRATION RATE: 16 BRPM

## 2018-03-06 LAB
BASOPHILS # BLD AUTO: 0.02 K/UL
BASOPHILS NFR BLD AUTO: 0.3 %
EOSINOPHIL # BLD AUTO: 0.24 K/UL
EOSINOPHIL NFR BLD AUTO: 3.7 %
HCT VFR BLD CALC: 38.6 %
HGB BLD-MCNC: 12.2 G/DL
IMM GRANULOCYTES NFR BLD AUTO: 0.5 %
LYMPHOCYTES # BLD AUTO: 1.88 K/UL
LYMPHOCYTES NFR BLD AUTO: 28.9 %
MAN DIFF?: NORMAL
MCHC RBC-ENTMCNC: 28.7 PG
MCHC RBC-ENTMCNC: 31.6 GM/DL
MCV RBC AUTO: 90.8 FL
MONOCYTES # BLD AUTO: 0.45 K/UL
MONOCYTES NFR BLD AUTO: 6.9 %
NEUTROPHILS # BLD AUTO: 3.88 K/UL
NEUTROPHILS NFR BLD AUTO: 59.7 %
PLATELET # BLD AUTO: 266 K/UL
RBC # BLD: 4.25 M/UL
RBC # FLD: 14.1 %
WBC # FLD AUTO: 6.5 K/UL

## 2018-03-06 PROCEDURE — 99214 OFFICE O/P EST MOD 30 MIN: CPT

## 2018-03-07 LAB
PSA FREE FLD-MCNC: 32.5
PSA FREE SERPL-MCNC: 0.25 NG/ML
PSA SERPL-MCNC: 0.77 NG/ML

## 2018-03-11 LAB
TESTOST BND SERPL-MCNC: 6.5 PG/ML
TESTOST SERPL-MCNC: 170 NG/DL

## 2018-04-23 RX ORDER — GABAPENTIN 100 MG/1
100 CAPSULE ORAL
Qty: 30 | Refills: 0 | Status: DISCONTINUED | COMMUNITY
Start: 2018-04-05

## 2018-04-23 RX ORDER — DICLOFENAC SODIUM 75 MG/1
75 TABLET, DELAYED RELEASE ORAL
Qty: 30 | Refills: 0 | Status: DISCONTINUED | COMMUNITY
Start: 2018-04-05

## 2018-04-24 ENCOUNTER — APPOINTMENT (OUTPATIENT)
Dept: UROLOGY | Facility: CLINIC | Age: 63
End: 2018-04-24

## 2018-04-24 ENCOUNTER — APPOINTMENT (OUTPATIENT)
Dept: INTERNAL MEDICINE | Facility: CLINIC | Age: 63
End: 2018-04-24
Payer: MEDICAID

## 2018-04-24 ENCOUNTER — RX RENEWAL (OUTPATIENT)
Age: 63
End: 2018-04-24

## 2018-04-24 VITALS
RESPIRATION RATE: 16 BRPM | HEIGHT: 67 IN | DIASTOLIC BLOOD PRESSURE: 80 MMHG | BODY MASS INDEX: 34.21 KG/M2 | HEART RATE: 60 BPM | TEMPERATURE: 97.7 F | OXYGEN SATURATION: 98 % | SYSTOLIC BLOOD PRESSURE: 130 MMHG | WEIGHT: 218 LBS

## 2018-04-24 DIAGNOSIS — L84 CORNS AND CALLOSITIES: ICD-10-CM

## 2018-04-24 LAB
25(OH)D3 SERPL-MCNC: 24.9 NG/ML
ALBUMIN SERPL ELPH-MCNC: 4.3 G/DL
ALP BLD-CCNC: 59 U/L
ALT SERPL-CCNC: 18 U/L
ANION GAP SERPL CALC-SCNC: 18 MMOL/L
APPEARANCE: CLEAR
AST SERPL-CCNC: 19 U/L
BASOPHILS # BLD AUTO: 0.02 K/UL
BASOPHILS NFR BLD AUTO: 0.4 %
BILIRUB SERPL-MCNC: 0.4 MG/DL
BILIRUBIN URINE: NEGATIVE
BLOOD URINE: NEGATIVE
BUN SERPL-MCNC: 19 MG/DL
CALCIUM SERPL-MCNC: 9.1 MG/DL
CHLORIDE SERPL-SCNC: 98 MMOL/L
CHOLEST SERPL-MCNC: 162 MG/DL
CHOLEST/HDLC SERPL: 4.1 RATIO
CO2 SERPL-SCNC: 23 MMOL/L
COLOR: YELLOW
CREAT SERPL-MCNC: 0.97 MG/DL
CREAT SPEC-SCNC: 95 MG/DL
EOSINOPHIL # BLD AUTO: 0.18 K/UL
EOSINOPHIL NFR BLD AUTO: 3.3 %
ERYTHROCYTE [SEDIMENTATION RATE] IN BLOOD BY WESTERGREN METHOD: 13 MM/HR
FOLATE SERPL-MCNC: 13.3 NG/ML
GGT SERPL-CCNC: 17 U/L
GLUCOSE QUALITATIVE U: NEGATIVE MG/DL
GLUCOSE SERPL-MCNC: 122 MG/DL
HBA1C MFR BLD HPLC: 5.8 %
HCT VFR BLD CALC: 37.5 %
HDLC SERPL-MCNC: 40 MG/DL
HGB BLD-MCNC: 12.2 G/DL
IMM GRANULOCYTES NFR BLD AUTO: 0.4 %
IRON SATN MFR SERPL: 23 %
IRON SERPL-MCNC: 84 UG/DL
KETONES URINE: NEGATIVE
LDLC SERPL CALC-MCNC: 96 MG/DL
LEUKOCYTE ESTERASE URINE: NEGATIVE
LYMPHOCYTES # BLD AUTO: 1.59 K/UL
LYMPHOCYTES NFR BLD AUTO: 28.8 %
MAN DIFF?: NORMAL
MCHC RBC-ENTMCNC: 29 PG
MCHC RBC-ENTMCNC: 32.5 GM/DL
MCV RBC AUTO: 89.3 FL
MICROALBUMIN 24H UR DL<=1MG/L-MCNC: 0.5 MG/DL
MICROALBUMIN/CREAT 24H UR-RTO: 5 MG/G
MONOCYTES # BLD AUTO: 0.48 K/UL
MONOCYTES NFR BLD AUTO: 8.7 %
NEUTROPHILS # BLD AUTO: 3.24 K/UL
NEUTROPHILS NFR BLD AUTO: 58.4 %
NITRITE URINE: NEGATIVE
PH URINE: 5.5
PLATELET # BLD AUTO: 254 K/UL
POTASSIUM SERPL-SCNC: 3.7 MMOL/L
PROT SERPL-MCNC: 7.4 G/DL
PROTEIN URINE: NEGATIVE MG/DL
RBC # BLD: 4.2 M/UL
RBC # FLD: 14.6 %
SODIUM SERPL-SCNC: 139 MMOL/L
SPECIFIC GRAVITY URINE: 1.02
T3 SERPL-MCNC: 124 NG/DL
T4 FREE SERPL-MCNC: 1.3 NG/DL
TIBC SERPL-MCNC: 358 UG/DL
TRIGL SERPL-MCNC: 130 MG/DL
TSH SERPL-ACNC: 1.18 UIU/ML
UIBC SERPL-MCNC: 274 UG/DL
UROBILINOGEN URINE: NEGATIVE MG/DL
VIT B12 SERPL-MCNC: 252 PG/ML
WBC # FLD AUTO: 5.53 K/UL

## 2018-04-24 PROCEDURE — 99214 OFFICE O/P EST MOD 30 MIN: CPT

## 2018-05-01 LAB — HEMOCCULT STL QL IA: NEGATIVE

## 2018-05-09 ENCOUNTER — MEDICATION RENEWAL (OUTPATIENT)
Age: 63
End: 2018-05-09

## 2018-05-09 ENCOUNTER — RX RENEWAL (OUTPATIENT)
Age: 63
End: 2018-05-09

## 2018-05-10 ENCOUNTER — APPOINTMENT (OUTPATIENT)
Dept: PODIATRY | Facility: CLINIC | Age: 63
End: 2018-05-10

## 2018-05-10 ENCOUNTER — OUTPATIENT (OUTPATIENT)
Dept: OUTPATIENT SERVICES | Facility: HOSPITAL | Age: 63
LOS: 1 days | End: 2018-05-10
Payer: MEDICAID

## 2018-05-10 VITALS
WEIGHT: 218 LBS | HEART RATE: 73 BPM | BODY MASS INDEX: 34.21 KG/M2 | HEIGHT: 67 IN | SYSTOLIC BLOOD PRESSURE: 137 MMHG | DIASTOLIC BLOOD PRESSURE: 82 MMHG

## 2018-05-10 DIAGNOSIS — Z98.890 OTHER SPECIFIED POSTPROCEDURAL STATES: Chronic | ICD-10-CM

## 2018-05-10 DIAGNOSIS — K60.3 ANAL FISTULA: Chronic | ICD-10-CM

## 2018-05-10 DIAGNOSIS — Z00.00 ENCOUNTER FOR GENERAL ADULT MEDICAL EXAMINATION WITHOUT ABNORMAL FINDINGS: ICD-10-CM

## 2018-05-10 DIAGNOSIS — Z96.642 PRESENCE OF LEFT ARTIFICIAL HIP JOINT: Chronic | ICD-10-CM

## 2018-05-10 PROCEDURE — G0463: CPT

## 2018-05-10 PROCEDURE — 11055 PARING/CUTG B9 HYPRKER LES 1: CPT

## 2018-05-11 DIAGNOSIS — L85.1 ACQUIRED KERATOSIS [KERATODERMA] PALMARIS ET PLANTARIS: ICD-10-CM

## 2018-05-11 DIAGNOSIS — I73.9 PERIPHERAL VASCULAR DISEASE, UNSPECIFIED: ICD-10-CM

## 2018-05-14 ENCOUNTER — OUTPATIENT (OUTPATIENT)
Dept: OUTPATIENT SERVICES | Facility: HOSPITAL | Age: 63
LOS: 1 days | End: 2018-05-14
Payer: MEDICAID

## 2018-05-14 DIAGNOSIS — Z96.642 PRESENCE OF LEFT ARTIFICIAL HIP JOINT: Chronic | ICD-10-CM

## 2018-05-14 DIAGNOSIS — Z98.890 OTHER SPECIFIED POSTPROCEDURAL STATES: Chronic | ICD-10-CM

## 2018-05-14 DIAGNOSIS — L84 CORNS AND CALLOSITIES: ICD-10-CM

## 2018-05-14 DIAGNOSIS — K60.3 ANAL FISTULA: Chronic | ICD-10-CM

## 2018-05-14 PROCEDURE — 73630 X-RAY EXAM OF FOOT: CPT

## 2018-05-14 PROCEDURE — 73630 X-RAY EXAM OF FOOT: CPT | Mod: 26,RT

## 2018-05-21 ENCOUNTER — RX RENEWAL (OUTPATIENT)
Age: 63
End: 2018-05-21

## 2018-05-31 ENCOUNTER — OUTPATIENT (OUTPATIENT)
Dept: OUTPATIENT SERVICES | Facility: HOSPITAL | Age: 63
LOS: 1 days | End: 2018-05-31
Payer: MEDICAID

## 2018-05-31 ENCOUNTER — APPOINTMENT (OUTPATIENT)
Dept: PODIATRY | Facility: CLINIC | Age: 63
End: 2018-05-31

## 2018-05-31 VITALS
TEMPERATURE: 97.8 F | OXYGEN SATURATION: 99 % | DIASTOLIC BLOOD PRESSURE: 71 MMHG | BODY MASS INDEX: 34.21 KG/M2 | WEIGHT: 218 LBS | RESPIRATION RATE: 18 BRPM | HEIGHT: 67 IN | SYSTOLIC BLOOD PRESSURE: 146 MMHG | HEART RATE: 62 BPM

## 2018-05-31 DIAGNOSIS — Z96.642 PRESENCE OF LEFT ARTIFICIAL HIP JOINT: Chronic | ICD-10-CM

## 2018-05-31 DIAGNOSIS — M25.572 PAIN IN LEFT ANKLE AND JOINTS OF LEFT FOOT: ICD-10-CM

## 2018-05-31 DIAGNOSIS — K60.3 ANAL FISTULA: Chronic | ICD-10-CM

## 2018-05-31 DIAGNOSIS — Z98.890 OTHER SPECIFIED POSTPROCEDURAL STATES: Chronic | ICD-10-CM

## 2018-05-31 DIAGNOSIS — Z00.00 ENCOUNTER FOR GENERAL ADULT MEDICAL EXAMINATION WITHOUT ABNORMAL FINDINGS: ICD-10-CM

## 2018-05-31 PROCEDURE — G0463: CPT

## 2018-06-01 ENCOUNTER — CLINICAL ADVICE (OUTPATIENT)
Age: 63
End: 2018-06-01

## 2018-06-01 DIAGNOSIS — L85.3 XEROSIS CUTIS: ICD-10-CM

## 2018-06-14 ENCOUNTER — LABORATORY RESULT (OUTPATIENT)
Age: 63
End: 2018-06-14

## 2018-06-14 ENCOUNTER — APPOINTMENT (OUTPATIENT)
Dept: DERMATOLOGY | Facility: CLINIC | Age: 63
End: 2018-06-14
Payer: MEDICAID

## 2018-06-14 VITALS
TEMPERATURE: 97.6 F | HEART RATE: 60 BPM | DIASTOLIC BLOOD PRESSURE: 88 MMHG | SYSTOLIC BLOOD PRESSURE: 145 MMHG | WEIGHT: 215 LBS | BODY MASS INDEX: 33.67 KG/M2

## 2018-06-14 PROCEDURE — 99203 OFFICE O/P NEW LOW 30 MIN: CPT

## 2018-07-19 RX ORDER — CYCLOBENZAPRINE HYDROCHLORIDE 5 MG/1
5 TABLET, FILM COATED ORAL
Qty: 30 | Refills: 0 | Status: DISCONTINUED | COMMUNITY
Start: 2018-05-25

## 2018-07-20 ENCOUNTER — APPOINTMENT (OUTPATIENT)
Dept: INTERNAL MEDICINE | Facility: CLINIC | Age: 63
End: 2018-07-20
Payer: MEDICAID

## 2018-07-20 VITALS
TEMPERATURE: 98 F | SYSTOLIC BLOOD PRESSURE: 130 MMHG | WEIGHT: 220 LBS | BODY MASS INDEX: 34.53 KG/M2 | RESPIRATION RATE: 16 BRPM | HEART RATE: 72 BPM | HEIGHT: 67 IN | OXYGEN SATURATION: 98 % | DIASTOLIC BLOOD PRESSURE: 80 MMHG

## 2018-07-20 DIAGNOSIS — J30.9 ALLERGIC RHINITIS, UNSPECIFIED: ICD-10-CM

## 2018-07-20 PROBLEM — K42.9 UMBILICAL HERNIA WITHOUT OBSTRUCTION OR GANGRENE: Chronic | Status: ACTIVE | Noted: 2017-12-29

## 2018-07-20 PROBLEM — E78.5 HYPERLIPIDEMIA, UNSPECIFIED: Chronic | Status: ACTIVE | Noted: 2017-12-29

## 2018-07-20 PROBLEM — J30.2 OTHER SEASONAL ALLERGIC RHINITIS: Chronic | Status: ACTIVE | Noted: 2017-12-29

## 2018-07-20 PROBLEM — K80.20 CALCULUS OF GALLBLADDER WITHOUT CHOLECYSTITIS WITHOUT OBSTRUCTION: Chronic | Status: ACTIVE | Noted: 2017-12-29

## 2018-07-20 PROBLEM — E34.9 ENDOCRINE DISORDER, UNSPECIFIED: Chronic | Status: ACTIVE | Noted: 2017-12-29

## 2018-07-20 PROBLEM — K21.9 GASTRO-ESOPHAGEAL REFLUX DISEASE WITHOUT ESOPHAGITIS: Chronic | Status: ACTIVE | Noted: 2018-01-03

## 2018-07-20 PROCEDURE — 99214 OFFICE O/P EST MOD 30 MIN: CPT

## 2018-07-20 NOTE — HISTORY OF PRESENT ILLNESS
[Weight Gain ___ Pounds] : Weight gain of [unfilled] pounds [___ # of attempts] : [unfilled] weight lost attempts [Following Diet Successfully] : Following the diet successfully [___ times per week] : Patient exercises [unfilled] times per week [Following  treatment as advised] : Patient is following  treatment as advised [Action] : Action: patient is following a plan to lose weight [Good understanding] : Patient has a good understanding of disease, goals and obesity follow-up plan [de-identified] : 63 year old  male patient with history of stable Essential Hypertension, Hypercholesterolemia with Hypertriglyceridemia, GERD without esophagitis, Allergic Rhinosinusitis, Elevated Hemoglobin A1c, history as stated, presented for follow up examination of Elevated BP checked. Patient is compliant with all medications. Denies shortness of breath, chest pain or abdominal pains at this time. ROS as stated.\par

## 2018-07-20 NOTE — ASSESSMENT
[FreeTextEntry1] : 63 year old male found to have stable Essential Hypertension, Hypercholesterolemia with Hypertriglyceridemia, GERD without esophagitis, Allergic Rhinosinusitis, Elevated Hemoglobin A1c,with the current regimen, diet and life style modifications, as counseled. Prior results reviewed and discussed with the patient during today's examination. Plan as ordered.\par

## 2018-07-21 LAB
ALBUMIN SERPL ELPH-MCNC: 4.5 G/DL
ALP BLD-CCNC: 68 U/L
ALT SERPL-CCNC: 16 U/L
ANION GAP SERPL CALC-SCNC: 16 MMOL/L
AST SERPL-CCNC: 20 U/L
BASOPHILS # BLD AUTO: 0.02 K/UL
BASOPHILS NFR BLD AUTO: 0.4 %
BILIRUB SERPL-MCNC: 0.4 MG/DL
BUN SERPL-MCNC: 22 MG/DL
CALCIUM SERPL-MCNC: 9.2 MG/DL
CHLORIDE SERPL-SCNC: 100 MMOL/L
CHOLEST SERPL-MCNC: 181 MG/DL
CHOLEST/HDLC SERPL: 3.9 RATIO
CO2 SERPL-SCNC: 24 MMOL/L
CREAT SERPL-MCNC: 1.02 MG/DL
EOSINOPHIL # BLD AUTO: 0.19 K/UL
EOSINOPHIL NFR BLD AUTO: 3.7 %
GGT SERPL-CCNC: 17 U/L
GLUCOSE SERPL-MCNC: 124 MG/DL
HBA1C MFR BLD HPLC: 5.8 %
HCT VFR BLD CALC: 37.8 %
HCV AB SER QL: NONREACTIVE
HCV S/CO RATIO: 0.13 S/CO
HDLC SERPL-MCNC: 46 MG/DL
HGB BLD-MCNC: 12.8 G/DL
IMM GRANULOCYTES NFR BLD AUTO: 0.2 %
LDLC SERPL CALC-MCNC: 106 MG/DL
LYMPHOCYTES # BLD AUTO: 1.43 K/UL
LYMPHOCYTES NFR BLD AUTO: 28 %
MAN DIFF?: NORMAL
MCHC RBC-ENTMCNC: 30.2 PG
MCHC RBC-ENTMCNC: 33.9 GM/DL
MCV RBC AUTO: 89.2 FL
MONOCYTES # BLD AUTO: 0.45 K/UL
MONOCYTES NFR BLD AUTO: 8.8 %
NEUTROPHILS # BLD AUTO: 3 K/UL
NEUTROPHILS NFR BLD AUTO: 58.9 %
PLATELET # BLD AUTO: 241 K/UL
POTASSIUM SERPL-SCNC: 3.7 MMOL/L
PROT SERPL-MCNC: 6.9 G/DL
RBC # BLD: 4.24 M/UL
RBC # FLD: 13.6 %
SODIUM SERPL-SCNC: 140 MMOL/L
TRIGL SERPL-MCNC: 146 MG/DL
WBC # FLD AUTO: 5.1 K/UL

## 2018-07-23 ENCOUNTER — TRANSCRIPTION ENCOUNTER (OUTPATIENT)
Age: 63
End: 2018-07-23

## 2018-09-03 ENCOUNTER — RX RENEWAL (OUTPATIENT)
Age: 63
End: 2018-09-03

## 2018-09-04 ENCOUNTER — APPOINTMENT (OUTPATIENT)
Dept: UROLOGY | Facility: CLINIC | Age: 63
End: 2018-09-04
Payer: MEDICAID

## 2018-09-04 VITALS
WEIGHT: 205 LBS | SYSTOLIC BLOOD PRESSURE: 122 MMHG | DIASTOLIC BLOOD PRESSURE: 80 MMHG | BODY MASS INDEX: 32.18 KG/M2 | HEIGHT: 67 IN | OXYGEN SATURATION: 98 % | HEART RATE: 67 BPM | RESPIRATION RATE: 16 BRPM

## 2018-09-04 PROCEDURE — 99214 OFFICE O/P EST MOD 30 MIN: CPT

## 2018-09-05 ENCOUNTER — APPOINTMENT (OUTPATIENT)
Dept: CARDIOLOGY | Facility: CLINIC | Age: 63
End: 2018-09-05
Payer: MEDICAID

## 2018-09-05 ENCOUNTER — OUTPATIENT (OUTPATIENT)
Dept: OUTPATIENT SERVICES | Facility: HOSPITAL | Age: 63
LOS: 1 days | End: 2018-09-05
Payer: MEDICAID

## 2018-09-05 VITALS
WEIGHT: 206 LBS | HEART RATE: 71 BPM | BODY MASS INDEX: 32.33 KG/M2 | SYSTOLIC BLOOD PRESSURE: 115 MMHG | DIASTOLIC BLOOD PRESSURE: 78 MMHG | OXYGEN SATURATION: 97 % | RESPIRATION RATE: 16 BRPM | HEIGHT: 67 IN

## 2018-09-05 DIAGNOSIS — K60.3 ANAL FISTULA: Chronic | ICD-10-CM

## 2018-09-05 DIAGNOSIS — Z00.8 ENCOUNTER FOR OTHER GENERAL EXAMINATION: ICD-10-CM

## 2018-09-05 DIAGNOSIS — Z98.890 OTHER SPECIFIED POSTPROCEDURAL STATES: Chronic | ICD-10-CM

## 2018-09-05 DIAGNOSIS — Z96.642 PRESENCE OF LEFT ARTIFICIAL HIP JOINT: Chronic | ICD-10-CM

## 2018-09-05 PROCEDURE — G0463: CPT | Mod: 25

## 2018-09-05 PROCEDURE — ZZZZZ: CPT

## 2018-09-07 ENCOUNTER — APPOINTMENT (OUTPATIENT)
Dept: INTERNAL MEDICINE | Facility: CLINIC | Age: 63
End: 2018-09-07
Payer: MEDICAID

## 2018-09-07 VITALS
WEIGHT: 205 LBS | BODY MASS INDEX: 32.18 KG/M2 | DIASTOLIC BLOOD PRESSURE: 70 MMHG | HEART RATE: 72 BPM | TEMPERATURE: 98.7 F | SYSTOLIC BLOOD PRESSURE: 120 MMHG | RESPIRATION RATE: 16 BRPM | HEIGHT: 67 IN | OXYGEN SATURATION: 98 %

## 2018-09-07 PROCEDURE — 99214 OFFICE O/P EST MOD 30 MIN: CPT

## 2018-09-08 LAB
ABO + RH PNL BLD: NORMAL
ALBUMIN SERPL ELPH-MCNC: 4.7 G/DL
ALP BLD-CCNC: 83 U/L
ALT SERPL-CCNC: 12 U/L
ANION GAP SERPL CALC-SCNC: 13 MMOL/L
APPEARANCE: CLEAR
APTT BLD: 34.3 SEC
AST SERPL-CCNC: 19 U/L
BASOPHILS # BLD AUTO: 0.01 K/UL
BASOPHILS NFR BLD AUTO: 0.1 %
BILIRUB SERPL-MCNC: 0.4 MG/DL
BILIRUBIN URINE: NEGATIVE
BLOOD URINE: NEGATIVE
BUN SERPL-MCNC: 18 MG/DL
CALCIUM SERPL-MCNC: 9.5 MG/DL
CHLORIDE SERPL-SCNC: 100 MMOL/L
CHOLEST SERPL-MCNC: 152 MG/DL
CHOLEST/HDLC SERPL: 4 RATIO
CO2 SERPL-SCNC: 26 MMOL/L
COLOR: YELLOW
CREAT SERPL-MCNC: 1.17 MG/DL
EOSINOPHIL # BLD AUTO: 0.15 K/UL
EOSINOPHIL NFR BLD AUTO: 1.6 %
GGT SERPL-CCNC: 15 U/L
GLUCOSE QUALITATIVE U: NEGATIVE MG/DL
GLUCOSE SERPL-MCNC: 110 MG/DL
HBA1C MFR BLD HPLC: 5.8 %
HCT VFR BLD CALC: 40.9 %
HCV AB SER QL: NONREACTIVE
HCV S/CO RATIO: 0.15 S/CO
HDLC SERPL-MCNC: 38 MG/DL
HGB BLD-MCNC: 14 G/DL
HIV1+2 AB SPEC QL IA.RAPID: NONREACTIVE
IMM GRANULOCYTES NFR BLD AUTO: 0.2 %
INR PPP: 0.99 RATIO
KETONES URINE: NEGATIVE
LDLC SERPL CALC-MCNC: 95 MG/DL
LEUKOCYTE ESTERASE URINE: NEGATIVE
LYMPHOCYTES # BLD AUTO: 1.13 K/UL
LYMPHOCYTES NFR BLD AUTO: 12.3 %
MAN DIFF?: NORMAL
MCHC RBC-ENTMCNC: 30.4 PG
MCHC RBC-ENTMCNC: 34.2 GM/DL
MCV RBC AUTO: 88.7 FL
MONOCYTES # BLD AUTO: 0.66 K/UL
MONOCYTES NFR BLD AUTO: 7.2 %
NEUTROPHILS # BLD AUTO: 7.25 K/UL
NEUTROPHILS NFR BLD AUTO: 78.6 %
NITRITE URINE: NEGATIVE
PH URINE: 5.5
PLATELET # BLD AUTO: 236 K/UL
POTASSIUM SERPL-SCNC: 3.3 MMOL/L
PROT SERPL-MCNC: 7.5 G/DL
PROTEIN URINE: NEGATIVE MG/DL
PT BLD: 11.2 SEC
RBC # BLD: 4.61 M/UL
RBC # FLD: 13.7 %
SODIUM SERPL-SCNC: 139 MMOL/L
SPECIFIC GRAVITY URINE: 1.03
TRIGL SERPL-MCNC: 95 MG/DL
UROBILINOGEN URINE: NEGATIVE MG/DL
WBC # FLD AUTO: 9.22 K/UL

## 2018-09-08 NOTE — HISTORY OF PRESENT ILLNESS
[No Pertinent Cardiac History] : no history of aortic stenosis, atrial fibrillation, coronary artery disease, recent myocardial infarction, or implantable device/pacemaker [No Pertinent Pulmonary History] : no history of asthma, COPD, sleep apnea, or smoking [No Adverse Anesthesia Reaction] : no adverse anesthesia reaction in self or family member [(Patient denies any chest pain, claudication, dyspnea on exertion, orthopnea, palpitations or syncope)] : Patient denies any chest pain, claudication, dyspnea on exertion, orthopnea, palpitations or syncope [Chronic Anticoagulation] : no chronic anticoagulation [Chronic Kidney Disease] : no chronic kidney disease [Diabetes] : no diabetes [FreeTextEntry1] : Circumcision [FreeTextEntry2] : 09/20/18 [FreeTextEntry3] : Dr. Trejo [FreeTextEntry4] : 63 year old male with history of stable Essential Hypertension, Allergic Bronchitis, GERD without esophagitis, Hypercholesterolemia with Hypertriglyceridemia, Elevated Hemoglobin A1c, history as stated, presented for clearance evaluation prior to possible Circumcision for Phimosis. Patient denies any associated symptoms of shortness of breath, chest pain, abdominal pain at this time.\par \par

## 2018-09-08 NOTE — ASSESSMENT
[Procedure Intermediate Risk] : the procedure risk is intermediate [Patient Intermediate Risk] : the patient is an intermediate risk [Optimized for Surgery Pending Laboratory Results] : the patient is optimized for surgery pending laboratory results [As per surgery] : as per surgery [FreeTextEntry3] : Hold Aspirin for one week prior to surgery and to restart next day after surgery, as per Surgeon [FreeTextEntry4] : 63 year old male found to have stable Essential Hypertension, Allergic Bronchitis, GERD without esophagitis, Hypercholesterolemia with Hypertriglyceridemia, Elevated Hemoglobin A1c, newly noted Oral Thrush, with the current regimen, diet and life style modifications, as counseled. Prior results reviewed and discussed with the patient during today's examination. Plan as ordered.\par Possible Circumcision for Phimosis, as per Urologist.\par Patient was recently evaluated by Cardiologist , findings and recommendations and clearance reviewed with the patient during today's examination.\par

## 2018-09-11 DIAGNOSIS — I10 ESSENTIAL (PRIMARY) HYPERTENSION: ICD-10-CM

## 2018-09-11 DIAGNOSIS — R73.09 OTHER ABNORMAL GLUCOSE: ICD-10-CM

## 2018-09-11 DIAGNOSIS — E78.2 MIXED HYPERLIPIDEMIA: ICD-10-CM

## 2018-09-13 ENCOUNTER — OUTPATIENT (OUTPATIENT)
Dept: OUTPATIENT SERVICES | Facility: HOSPITAL | Age: 63
LOS: 1 days | End: 2018-09-13
Payer: MEDICAID

## 2018-09-13 VITALS
WEIGHT: 205.03 LBS | HEIGHT: 68 IN | SYSTOLIC BLOOD PRESSURE: 155 MMHG | RESPIRATION RATE: 16 BRPM | TEMPERATURE: 99 F | HEART RATE: 64 BPM | DIASTOLIC BLOOD PRESSURE: 87 MMHG | OXYGEN SATURATION: 98 %

## 2018-09-13 DIAGNOSIS — N47.1 PHIMOSIS: ICD-10-CM

## 2018-09-13 DIAGNOSIS — Z98.890 OTHER SPECIFIED POSTPROCEDURAL STATES: Chronic | ICD-10-CM

## 2018-09-13 DIAGNOSIS — Z01.818 ENCOUNTER FOR OTHER PREPROCEDURAL EXAMINATION: ICD-10-CM

## 2018-09-13 DIAGNOSIS — K60.3 ANAL FISTULA: Chronic | ICD-10-CM

## 2018-09-13 DIAGNOSIS — Z96.642 PRESENCE OF LEFT ARTIFICIAL HIP JOINT: Chronic | ICD-10-CM

## 2018-09-13 DIAGNOSIS — I10 ESSENTIAL (PRIMARY) HYPERTENSION: ICD-10-CM

## 2018-09-13 PROCEDURE — G0463: CPT

## 2018-09-13 RX ORDER — OMEGA-3 ACID ETHYL ESTERS 1 G
1 CAPSULE ORAL
Qty: 0 | Refills: 0 | COMMUNITY

## 2018-09-13 RX ORDER — ACETAMINOPHEN 500 MG
2 TABLET ORAL
Qty: 0 | Refills: 0 | COMMUNITY

## 2018-09-13 RX ORDER — SODIUM CHLORIDE 9 MG/ML
3 INJECTION INTRAMUSCULAR; INTRAVENOUS; SUBCUTANEOUS EVERY 8 HOURS
Qty: 0 | Refills: 0 | Status: DISCONTINUED | OUTPATIENT
Start: 2018-09-20 | End: 2018-09-28

## 2018-09-13 RX ORDER — ASCORBIC ACID 60 MG
1 TABLET,CHEWABLE ORAL
Qty: 0 | Refills: 0 | COMMUNITY

## 2018-09-13 RX ORDER — OMEPRAZOLE 10 MG/1
1 CAPSULE, DELAYED RELEASE ORAL
Qty: 0 | Refills: 0 | COMMUNITY

## 2018-09-13 NOTE — H&P PST ADULT - PMH
Cholelithiasis    Depression    Gall bladder stones    GERD (gastroesophageal reflux disease)    High cholesterol    HLD (hyperlipidemia)    Hypertension    Low testosterone    Phimosis    Seasonal allergies    Umbilical hernia

## 2018-09-13 NOTE — H&P PST ADULT - HISTORY OF PRESENT ILLNESS
63 year old male with history of HTN, HLD, had left hip replacement in 2016, occasional GERD presents with phimosis. Pt states he started to have some pain during sexual activity and brought this issue to his primary doctor. Pt was informed by urologist that surgery for a circumcision will relieve his issue scheduled on 9/20/2018.

## 2018-09-13 NOTE — H&P PST ADULT - FAMILY HISTORY
Sibling  Still living? No  Family history of brain aneurysm, Age at diagnosis: Age Unknown  Family history of stomach cancer, Age at diagnosis: Age Unknown     Father  Still living? No  Family history of myocardial infarction, Age at diagnosis: Age Unknown  Family history of hypertension in father, Age at diagnosis: Age Unknown     Mother  Still living? No  Family history of stomach cancer, Age at diagnosis: Age Unknown  Family history of Alzheimer's disease, Age at diagnosis: Age Unknown

## 2018-09-19 ENCOUNTER — TRANSCRIPTION ENCOUNTER (OUTPATIENT)
Age: 63
End: 2018-09-19

## 2018-09-20 ENCOUNTER — RESULT REVIEW (OUTPATIENT)
Age: 63
End: 2018-09-20

## 2018-09-20 ENCOUNTER — OUTPATIENT (OUTPATIENT)
Dept: OUTPATIENT SERVICES | Facility: HOSPITAL | Age: 63
LOS: 1 days | End: 2018-09-20
Payer: MEDICAID

## 2018-09-20 ENCOUNTER — APPOINTMENT (OUTPATIENT)
Dept: UROLOGY | Facility: HOSPITAL | Age: 63
End: 2018-09-20

## 2018-09-20 VITALS
HEART RATE: 688 BPM | DIASTOLIC BLOOD PRESSURE: 79 MMHG | RESPIRATION RATE: 14 BRPM | OXYGEN SATURATION: 99 % | SYSTOLIC BLOOD PRESSURE: 126 MMHG | TEMPERATURE: 98 F

## 2018-09-20 VITALS
WEIGHT: 205.03 LBS | OXYGEN SATURATION: 95 % | SYSTOLIC BLOOD PRESSURE: 133 MMHG | DIASTOLIC BLOOD PRESSURE: 66 MMHG | HEART RATE: 72 BPM | RESPIRATION RATE: 16 BRPM | TEMPERATURE: 98 F | HEIGHT: 68 IN

## 2018-09-20 DIAGNOSIS — Z96.642 PRESENCE OF LEFT ARTIFICIAL HIP JOINT: Chronic | ICD-10-CM

## 2018-09-20 DIAGNOSIS — N47.1 PHIMOSIS: ICD-10-CM

## 2018-09-20 DIAGNOSIS — Z98.890 OTHER SPECIFIED POSTPROCEDURAL STATES: Chronic | ICD-10-CM

## 2018-09-20 DIAGNOSIS — K60.3 ANAL FISTULA: Chronic | ICD-10-CM

## 2018-09-20 PROCEDURE — 54161 CIRCUM 28 DAYS OR OLDER: CPT

## 2018-09-20 PROCEDURE — 88304 TISSUE EXAM BY PATHOLOGIST: CPT

## 2018-09-20 PROCEDURE — 88304 TISSUE EXAM BY PATHOLOGIST: CPT | Mod: 26

## 2018-09-20 RX ORDER — SODIUM CHLORIDE 9 MG/ML
1000 INJECTION, SOLUTION INTRAVENOUS
Qty: 0 | Refills: 0 | Status: DISCONTINUED | OUTPATIENT
Start: 2018-09-20 | End: 2018-09-20

## 2018-09-20 RX ORDER — OXYCODONE AND ACETAMINOPHEN 5; 325 MG/1; MG/1
1 TABLET ORAL EVERY 6 HOURS
Qty: 0 | Refills: 0 | Status: DISCONTINUED | OUTPATIENT
Start: 2018-09-20 | End: 2018-09-20

## 2018-09-20 RX ORDER — ONDANSETRON 8 MG/1
4 TABLET, FILM COATED ORAL EVERY 6 HOURS
Qty: 0 | Refills: 0 | Status: DISCONTINUED | OUTPATIENT
Start: 2018-09-20 | End: 2018-09-28

## 2018-09-20 RX ORDER — IBUPROFEN 200 MG
1 TABLET ORAL
Qty: 16 | Refills: 0
Start: 2018-09-20 | End: 2018-09-23

## 2018-09-20 RX ORDER — BACITRACIN ZINC 500 UNIT/G
1 OINTMENT IN PACKET (EA) TOPICAL
Qty: 1 | Refills: 0
Start: 2018-09-20 | End: 2018-09-26

## 2018-09-20 RX ADMIN — SODIUM CHLORIDE 3 MILLILITER(S): 9 INJECTION INTRAMUSCULAR; INTRAVENOUS; SUBCUTANEOUS at 07:01

## 2018-09-20 NOTE — ASU DISCHARGE PLAN (ADULT/PEDIATRIC). - MEDICATION SUMMARY - MEDICATIONS TO TAKE
I will START or STAY ON the medications listed below when I get home from the hospital:    Aspirin Enteric Coated 81 mg oral delayed release tablet  -- 1 tab(s) by mouth once a day  -- Indication: For as prescribed    ibuprofen 600 mg oral tablet  -- 1 tab(s) by mouth every 6 hours, As Needed -for moderate pain   -- Do not take this drug if you are pregnant.  It is very important that you take or use this exactly as directed.  Do not skip doses or discontinue unless directed by your doctor.  May cause drowsiness or dizziness.  Obtain medical advice before taking any non-prescription drugs as some may affect the action of this medication.  Take with food or milk.    -- Indication: For Phimosis    gemfibrozil 600 mg oral tablet  -- 1 tab(s) by mouth 2 times a day  -- Indication: For as prescribed    lisinopril-hydroCHLOROthiazide 20 mg-25 mg oral tablet  -- 1 tab(s) by mouth once a day  -- Indication: For as prescribed    labetalol 100 mg oral tablet  -- 1 tab(s) by mouth 2 times a day  -- Indication: For as prescribed    bacitracin 500 units/g topical ointment  -- Apply on skin to affected area 3 times a day   -- For external use only.    -- Indication: For Phimosis    Flonase 50 mcg/inh nasal spray  -- 1 spray(s) into nose once a day, As Needed  -- Indication: For as prescribed    testosterone 30 mg/actuation transdermal solution  -- 1 pump(s) by transdermal patch once a day to each axilla  -- Indication: For as prescribed

## 2018-09-20 NOTE — ASU DISCHARGE PLAN (ADULT/PEDIATRIC). - NOTIFY
Unable to Urinate/Pain not relieved by Medications/Bleeding that does not stop/Swelling that continues/Fever greater than 101

## 2018-09-21 PROBLEM — N47.1 PHIMOSIS: Chronic | Status: ACTIVE | Noted: 2018-09-13

## 2018-09-29 ENCOUNTER — RX RENEWAL (OUTPATIENT)
Age: 63
End: 2018-09-29

## 2018-10-02 ENCOUNTER — APPOINTMENT (OUTPATIENT)
Dept: UROLOGY | Facility: CLINIC | Age: 63
End: 2018-10-02
Payer: MEDICAID

## 2018-10-02 DIAGNOSIS — M19.90 UNSPECIFIED OSTEOARTHRITIS, UNSPECIFIED SITE: ICD-10-CM

## 2018-10-02 PROCEDURE — 99214 OFFICE O/P EST MOD 30 MIN: CPT

## 2018-11-01 ENCOUNTER — RX RENEWAL (OUTPATIENT)
Age: 63
End: 2018-11-01

## 2018-11-06 ENCOUNTER — RX RENEWAL (OUTPATIENT)
Age: 63
End: 2018-11-06

## 2018-11-13 ENCOUNTER — APPOINTMENT (OUTPATIENT)
Dept: UROLOGY | Facility: CLINIC | Age: 63
End: 2018-11-13
Payer: MEDICAID

## 2018-11-13 VITALS
DIASTOLIC BLOOD PRESSURE: 85 MMHG | RESPIRATION RATE: 16 BRPM | SYSTOLIC BLOOD PRESSURE: 130 MMHG | HEART RATE: 76 BPM | OXYGEN SATURATION: 98 %

## 2018-11-13 DIAGNOSIS — F41.9 ANXIETY DISORDER, UNSPECIFIED: ICD-10-CM

## 2018-11-13 DIAGNOSIS — R79.89 OTHER SPECIFIED ABNORMAL FINDINGS OF BLOOD CHEMISTRY: ICD-10-CM

## 2018-11-13 DIAGNOSIS — N47.1 PHIMOSIS: ICD-10-CM

## 2018-11-13 DIAGNOSIS — N52.9 MALE ERECTILE DYSFUNCTION, UNSPECIFIED: ICD-10-CM

## 2018-11-13 PROCEDURE — 99214 OFFICE O/P EST MOD 30 MIN: CPT

## 2018-11-20 ENCOUNTER — RX RENEWAL (OUTPATIENT)
Age: 63
End: 2018-11-20

## 2018-11-30 ENCOUNTER — APPOINTMENT (OUTPATIENT)
Dept: INTERNAL MEDICINE | Facility: CLINIC | Age: 63
End: 2018-11-30

## 2018-12-12 ENCOUNTER — CLINICAL ADVICE (OUTPATIENT)
Age: 63
End: 2018-12-12

## 2018-12-12 ENCOUNTER — APPOINTMENT (OUTPATIENT)
Dept: UROLOGY | Facility: CLINIC | Age: 63
End: 2018-12-12

## 2019-01-06 ENCOUNTER — RX RENEWAL (OUTPATIENT)
Age: 64
End: 2019-01-06

## 2019-01-08 ENCOUNTER — APPOINTMENT (OUTPATIENT)
Dept: UROLOGY | Facility: CLINIC | Age: 64
End: 2019-01-08

## 2019-03-04 ENCOUNTER — LABORATORY RESULT (OUTPATIENT)
Age: 64
End: 2019-03-04

## 2019-03-04 ENCOUNTER — APPOINTMENT (OUTPATIENT)
Dept: CARDIOLOGY | Facility: CLINIC | Age: 64
End: 2019-03-04
Payer: MEDICAID

## 2019-03-04 VITALS
WEIGHT: 221.23 LBS | OXYGEN SATURATION: 98 % | HEART RATE: 63 BPM | DIASTOLIC BLOOD PRESSURE: 85 MMHG | HEIGHT: 67 IN | TEMPERATURE: 96.9 F | BODY MASS INDEX: 34.72 KG/M2 | SYSTOLIC BLOOD PRESSURE: 140 MMHG

## 2019-03-04 PROCEDURE — 99214 OFFICE O/P EST MOD 30 MIN: CPT

## 2019-03-04 PROCEDURE — 93000 ELECTROCARDIOGRAM COMPLETE: CPT

## 2019-03-04 NOTE — PHYSICAL EXAM
[General Appearance - Well Developed] : well developed [Normal Appearance] : normal appearance [Well Groomed] : well groomed [General Appearance - Well Nourished] : well nourished [No Deformities] : no deformities [General Appearance - In No Acute Distress] : no acute distress [Normal Conjunctiva] : the conjunctiva exhibited no abnormalities [Eyelids - No Xanthelasma] : the eyelids demonstrated no xanthelasmas [Normal Oral Mucosa] : normal oral mucosa [No Oral Pallor] : no oral pallor [No Oral Cyanosis] : no oral cyanosis [Normal Jugular Venous A Waves Present] : normal jugular venous A waves present [Normal Jugular Venous V Waves Present] : normal jugular venous V waves present [No Jugular Venous Romero A Waves] : no jugular venous romero A waves [Respiration, Rhythm And Depth] : normal respiratory rhythm and effort [Exaggerated Use Of Accessory Muscles For Inspiration] : no accessory muscle use [Auscultation Breath Sounds / Voice Sounds] : lungs were clear to auscultation bilaterally [Heart Rate And Rhythm] : heart rate and rhythm were normal [Heart Sounds] : normal S1 and S2 [Murmurs] : no murmurs present [Abdomen Soft] : soft [Abdomen Tenderness] : non-tender [Abdomen Mass (___ Cm)] : no abdominal mass palpated [Abnormal Walk] : normal gait [Gait - Sufficient For Exercise Testing] : the gait was sufficient for exercise testing [Nail Clubbing] : no clubbing of the fingernails [Cyanosis, Localized] : no localized cyanosis [Petechial Hemorrhages (___cm)] : no petechial hemorrhages [Skin Color & Pigmentation] : normal skin color and pigmentation [] : no rash [No Venous Stasis] : no venous stasis [Skin Lesions] : no skin lesions [No Skin Ulcers] : no skin ulcer [No Xanthoma] : no  xanthoma was observed [Oriented To Time, Place, And Person] : oriented to person, place, and time [Affect] : the affect was normal [Mood] : the mood was normal [No Anxiety] : not feeling anxious

## 2019-03-04 NOTE — REASON FOR VISIT
[FreeTextEntry1] : Dear Dr. Orosco:\par \par I had the pleasure of evaluating your patient, Mr. Michael Lee, for follow-up cardiovascular evaluation.  I last saw him in the office in September 2018.  He has no cardiovascular complaints since his last visit.  \par \par As you know, he is a 62 yo man with history of hypertension, hyperlipidemia, prediabetes, GERD, anxiety, borderline pulmonary HTN noted on his last echocardiogram who previously underwent left total hip replacement and then cholecystectomy without issues.  \par \par His weights have remained stable, although he would like to lose more weights.  His BPs at home have also remained stable.  He would like to stop one of his anti-HTN medications and see if he can simplify his regimen.  Review of his current regimen includes lisinopril 20/HCTZ 25 daily and labetalol 100 BID (which he admits he is only taking once daily).\par \par At this time, I have asked that he can attempt stopping the beta blocker for now and assessing his BPs at home for a week to see if he can simplify his regimen.  If he does need additional medical therapy, we can consider increasing the lisinopril to 40 mg daily.\par \par 12-lead ECG demonstrates sinus rhythm, normal axis, normal intervals.  Physical exam is unremarkable.\par \par I have also arranged for him to get an echocardiogram.\par \par Thank you for entrusting his care with me. \par \par Warmest regards,\par Ryan Suarez\par \par

## 2019-03-05 ENCOUNTER — APPOINTMENT (OUTPATIENT)
Dept: PODIATRY | Facility: CLINIC | Age: 64
End: 2019-03-05

## 2019-03-05 ENCOUNTER — APPOINTMENT (OUTPATIENT)
Dept: DERMATOLOGY | Facility: CLINIC | Age: 64
End: 2019-03-05
Payer: MEDICAID

## 2019-03-05 ENCOUNTER — OUTPATIENT (OUTPATIENT)
Dept: OUTPATIENT SERVICES | Facility: HOSPITAL | Age: 64
LOS: 1 days | End: 2019-03-05
Payer: MEDICAID

## 2019-03-05 VITALS
WEIGHT: 221 LBS | HEIGHT: 67 IN | HEART RATE: 89 BPM | BODY MASS INDEX: 34.69 KG/M2 | TEMPERATURE: 98.2 F | DIASTOLIC BLOOD PRESSURE: 90 MMHG | SYSTOLIC BLOOD PRESSURE: 133 MMHG

## 2019-03-05 VITALS
HEIGHT: 67 IN | SYSTOLIC BLOOD PRESSURE: 146 MMHG | WEIGHT: 221 LBS | HEART RATE: 73 BPM | TEMPERATURE: 97.8 F | BODY MASS INDEX: 34.69 KG/M2 | DIASTOLIC BLOOD PRESSURE: 89 MMHG

## 2019-03-05 DIAGNOSIS — Z00.00 ENCOUNTER FOR GENERAL ADULT MEDICAL EXAMINATION WITHOUT ABNORMAL FINDINGS: ICD-10-CM

## 2019-03-05 DIAGNOSIS — Z98.890 OTHER SPECIFIED POSTPROCEDURAL STATES: Chronic | ICD-10-CM

## 2019-03-05 DIAGNOSIS — D48.5 NEOPLASM OF UNCERTAIN BEHAVIOR OF SKIN: ICD-10-CM

## 2019-03-05 DIAGNOSIS — K60.3 ANAL FISTULA: Chronic | ICD-10-CM

## 2019-03-05 DIAGNOSIS — Z96.642 PRESENCE OF LEFT ARTIFICIAL HIP JOINT: Chronic | ICD-10-CM

## 2019-03-05 PROCEDURE — 11102 TANGNTL BX SKIN SINGLE LES: CPT

## 2019-03-05 PROCEDURE — 99214 OFFICE O/P EST MOD 30 MIN: CPT | Mod: 25

## 2019-03-05 PROCEDURE — G0463: CPT

## 2019-03-05 NOTE — HISTORY OF PRESENT ILLNESS
[FreeTextEntry1] : 64 y/o male pt presents to clinic for right submet 3 painful callus. Pt stated he has had this for some time now and it is painful upon ambulation. Pt denies any trauma. Pt goes to  and gets it shaved down by pumice stone. Pt denies f/c/n/v/sob/d. \par \par PMhx: none\par Allergies: none\par Pshx: left hip sx

## 2019-03-05 NOTE — ASSESSMENT
[FreeTextEntry1] : BORSI: right foot \par Vasc: DP/PT: 2/4 Rt CFT < 3 secs x 5, TG: WNL\par Neuro: protective sensation intact \par Derm: painful hyperkeratotic lesion submet 3 on right, no open lesion, no erythema, no edema, no malodor, no drainage\par \par A:\par callus right foot\par \par P:\par Pt evaluated, chart reviewed\par Discussed diagnosis and treatment plan with the patient\par New x-rays ordered of right foot \par Aseptic debridement of callus with sterile blade. Pt tolerated it well\par RTC 3 months

## 2019-03-06 ENCOUNTER — OUTPATIENT (OUTPATIENT)
Dept: OUTPATIENT SERVICES | Facility: HOSPITAL | Age: 64
LOS: 1 days | End: 2019-03-06
Payer: MEDICAID

## 2019-03-06 ENCOUNTER — APPOINTMENT (OUTPATIENT)
Dept: CARDIOLOGY | Facility: CLINIC | Age: 64
End: 2019-03-06

## 2019-03-06 DIAGNOSIS — K60.3 ANAL FISTULA: Chronic | ICD-10-CM

## 2019-03-06 DIAGNOSIS — I10 ESSENTIAL (PRIMARY) HYPERTENSION: ICD-10-CM

## 2019-03-06 DIAGNOSIS — Z98.890 OTHER SPECIFIED POSTPROCEDURAL STATES: Chronic | ICD-10-CM

## 2019-03-06 DIAGNOSIS — Z96.642 PRESENCE OF LEFT ARTIFICIAL HIP JOINT: Chronic | ICD-10-CM

## 2019-03-06 PROCEDURE — 93306 TTE W/DOPPLER COMPLETE: CPT | Mod: 26

## 2019-03-06 PROCEDURE — 93306 TTE W/DOPPLER COMPLETE: CPT

## 2019-03-07 ENCOUNTER — APPOINTMENT (OUTPATIENT)
Dept: INTERNAL MEDICINE | Facility: CLINIC | Age: 64
End: 2019-03-07

## 2019-03-07 DIAGNOSIS — B35.1 TINEA UNGUIUM: ICD-10-CM

## 2019-03-07 DIAGNOSIS — M79.671 PAIN IN RIGHT FOOT: ICD-10-CM

## 2019-03-07 DIAGNOSIS — L84 CORNS AND CALLOSITIES: ICD-10-CM

## 2019-03-11 ENCOUNTER — APPOINTMENT (OUTPATIENT)
Dept: INTERNAL MEDICINE | Facility: CLINIC | Age: 64
End: 2019-03-11
Payer: MEDICAID

## 2019-03-11 VITALS
HEART RATE: 96 BPM | OXYGEN SATURATION: 98 % | DIASTOLIC BLOOD PRESSURE: 80 MMHG | TEMPERATURE: 98.1 F | WEIGHT: 219 LBS | SYSTOLIC BLOOD PRESSURE: 140 MMHG | RESPIRATION RATE: 16 BRPM | BODY MASS INDEX: 34.37 KG/M2 | HEIGHT: 67 IN

## 2019-03-11 PROCEDURE — 99214 OFFICE O/P EST MOD 30 MIN: CPT

## 2019-03-11 RX ORDER — NYSTATIN 100000 [USP'U]/ML
100000 SUSPENSION ORAL 3 TIMES DAILY
Qty: 100 | Refills: 5 | Status: DISCONTINUED | COMMUNITY
Start: 2018-09-07 | End: 2019-03-11

## 2019-03-11 RX ORDER — FLUCONAZOLE 200 MG/1
200 TABLET ORAL
Qty: 1 | Refills: 0 | Status: DISCONTINUED | COMMUNITY
Start: 2018-06-14 | End: 2019-03-11

## 2019-03-11 RX ORDER — ETOH/EUC OIL/MENTH/PEP/WINTERG
1 SPRAY, NON-AEROSOL (ML) MUCOUS MEMBRANE
Qty: 1 | Refills: 1 | Status: DISCONTINUED | COMMUNITY
Start: 2019-03-05 | End: 2019-03-11

## 2019-03-11 NOTE — HISTORY OF PRESENT ILLNESS
[de-identified] : 63 year old  male patient with history of stable Essential Hypertension, Hypercholesterolemia with Hypertriglyceridemia, GERD without esophagitis, Elevated Hemoglobin A1c, Allergic Bronchitis, Allergic Rhinosinusitis, history as stated, presented for follow up examination of Elevated BP checked. Patient is compliant with all medications. Denies shortness of breath, chest pain or abdominal pains at this time. ROS as stated.\par

## 2019-03-11 NOTE — ASSESSMENT
[FreeTextEntry1] : 63 year old male found to have stable Essential Hypertension, Hypercholesterolemia with Hypertriglyceridemia, GERD without esophagitis, Elevated Hemoglobin A1c, Allergic Bronchitis, Allergic Rhinosinusitis,with the current regimen, diet and life style modifications, as counseled. Prior results reviewed and discussed with the patient during today's examination. Plan as ordered.\par Patient was recently evaluated by DERM/CARD/POD, findings and recommendations reviewed with the patient during today's examination.\par

## 2019-03-12 LAB
25(OH)D3 SERPL-MCNC: 30.3 NG/ML
ALBUMIN SERPL ELPH-MCNC: 4.7 G/DL
ALP BLD-CCNC: 60 U/L
ALT SERPL-CCNC: 28 U/L
ANION GAP SERPL CALC-SCNC: 16 MMOL/L
APPEARANCE: CLEAR
AST SERPL-CCNC: 23 U/L
BASOPHILS # BLD AUTO: 0.05 K/UL
BASOPHILS NFR BLD AUTO: 0.8 %
BILIRUB SERPL-MCNC: 0.3 MG/DL
BILIRUBIN URINE: NEGATIVE
BLOOD URINE: NEGATIVE
BUN SERPL-MCNC: 23 MG/DL
CALCIUM SERPL-MCNC: 9.6 MG/DL
CHLORIDE SERPL-SCNC: 99 MMOL/L
CHOLEST SERPL-MCNC: 177 MG/DL
CHOLEST/HDLC SERPL: 3.7 RATIO
CO2 SERPL-SCNC: 24 MMOL/L
COLOR: NORMAL
CREAT SERPL-MCNC: 1.08 MG/DL
CREAT SPEC-SCNC: 111 MG/DL
EOSINOPHIL # BLD AUTO: 0.18 K/UL
EOSINOPHIL NFR BLD AUTO: 2.8 %
ERYTHROCYTE [SEDIMENTATION RATE] IN BLOOD BY WESTERGREN METHOD: 14 MM/HR
FOLATE SERPL-MCNC: 13.1 NG/ML
GGT SERPL-CCNC: 18 U/L
GLUCOSE QUALITATIVE U: NEGATIVE
GLUCOSE SERPL-MCNC: 126 MG/DL
HBA1C MFR BLD HPLC: 5.6 %
HCT VFR BLD CALC: 38.3 %
HDLC SERPL-MCNC: 48 MG/DL
HGB BLD-MCNC: 13 G/DL
IMM GRANULOCYTES NFR BLD AUTO: 0.5 %
IRON SATN MFR SERPL: 25 %
IRON SERPL-MCNC: 83 UG/DL
KETONES URINE: NEGATIVE
LDLC SERPL CALC-MCNC: 96 MG/DL
LEUKOCYTE ESTERASE URINE: NEGATIVE
LYMPHOCYTES # BLD AUTO: 1.68 K/UL
LYMPHOCYTES NFR BLD AUTO: 26.5 %
MAN DIFF?: NORMAL
MCHC RBC-ENTMCNC: 30 PG
MCHC RBC-ENTMCNC: 33.9 GM/DL
MCV RBC AUTO: 88.5 FL
MICROALBUMIN 24H UR DL<=1MG/L-MCNC: <1.2 MG/DL
MICROALBUMIN/CREAT 24H UR-RTO: NORMAL MG/G
MONOCYTES # BLD AUTO: 0.42 K/UL
MONOCYTES NFR BLD AUTO: 6.6 %
NEUTROPHILS # BLD AUTO: 3.98 K/UL
NEUTROPHILS NFR BLD AUTO: 62.8 %
NITRITE URINE: NEGATIVE
PH URINE: 6
PLATELET # BLD AUTO: 271 K/UL
POTASSIUM SERPL-SCNC: 3.9 MMOL/L
PROT SERPL-MCNC: 7.2 G/DL
PROTEIN URINE: NEGATIVE
PSA FREE FLD-MCNC: 29 %
PSA FREE SERPL-MCNC: 0.26 NG/ML
PSA SERPL-MCNC: 0.92 NG/ML
RBC # BLD: 4.33 M/UL
RBC # FLD: 13.2 %
SODIUM SERPL-SCNC: 139 MMOL/L
SPECIFIC GRAVITY URINE: 1.02
T3 SERPL-MCNC: 119 NG/DL
T4 FREE SERPL-MCNC: 1.3 NG/DL
TIBC SERPL-MCNC: 332 UG/DL
TRIGL SERPL-MCNC: 164 MG/DL
TSH SERPL-ACNC: 0.89 UIU/ML
UIBC SERPL-MCNC: 249 UG/DL
UROBILINOGEN URINE: NORMAL
VIT B12 SERPL-MCNC: 324 PG/ML
WBC # FLD AUTO: 6.34 K/UL

## 2019-03-13 ENCOUNTER — APPOINTMENT (OUTPATIENT)
Dept: UROLOGY | Facility: CLINIC | Age: 64
End: 2019-03-13

## 2019-03-19 LAB — HEMOCCULT STL QL IA: NEGATIVE

## 2019-04-15 ENCOUNTER — RX RENEWAL (OUTPATIENT)
Age: 64
End: 2019-04-15

## 2019-04-17 ENCOUNTER — LABORATORY RESULT (OUTPATIENT)
Age: 64
End: 2019-04-17

## 2019-04-18 ENCOUNTER — APPOINTMENT (OUTPATIENT)
Dept: DERMATOLOGY | Facility: CLINIC | Age: 64
End: 2019-04-18
Payer: MEDICAID

## 2019-04-18 VITALS
DIASTOLIC BLOOD PRESSURE: 78 MMHG | WEIGHT: 200 LBS | SYSTOLIC BLOOD PRESSURE: 107 MMHG | BODY MASS INDEX: 31.39 KG/M2 | HEIGHT: 67 IN

## 2019-04-18 PROCEDURE — 12032 INTMD RPR S/A/T/EXT 2.6-7.5: CPT

## 2019-04-18 PROCEDURE — 11603 EXC TR-EXT MAL+MARG 2.1-3 CM: CPT

## 2019-05-02 ENCOUNTER — APPOINTMENT (OUTPATIENT)
Dept: DERMATOLOGY | Facility: CLINIC | Age: 64
End: 2019-05-02
Payer: MEDICAID

## 2019-05-02 VITALS
WEIGHT: 200 LBS | BODY MASS INDEX: 31.39 KG/M2 | HEART RATE: 77 BPM | HEIGHT: 67 IN | SYSTOLIC BLOOD PRESSURE: 123 MMHG | DIASTOLIC BLOOD PRESSURE: 70 MMHG

## 2019-05-02 DIAGNOSIS — C44.92 SQUAMOUS CELL CARCINOMA OF SKIN, UNSPECIFIED: ICD-10-CM

## 2019-05-02 PROCEDURE — 99024 POSTOP FOLLOW-UP VISIT: CPT

## 2019-05-14 ENCOUNTER — RX RENEWAL (OUTPATIENT)
Age: 64
End: 2019-05-14

## 2019-06-09 PROBLEM — N47.1 PHIMOSIS: Status: ACTIVE | Noted: 2018-09-04

## 2019-06-10 ENCOUNTER — APPOINTMENT (OUTPATIENT)
Dept: INTERNAL MEDICINE | Facility: CLINIC | Age: 64
End: 2019-06-10

## 2019-06-17 ENCOUNTER — MOBILE ON CALL (OUTPATIENT)
Age: 64
End: 2019-06-17

## 2019-06-27 ENCOUNTER — RX RENEWAL (OUTPATIENT)
Age: 64
End: 2019-06-27

## 2019-09-09 ENCOUNTER — APPOINTMENT (OUTPATIENT)
Dept: CARDIOLOGY | Facility: CLINIC | Age: 64
End: 2019-09-09
Payer: MEDICAID

## 2019-09-09 VITALS
DIASTOLIC BLOOD PRESSURE: 89 MMHG | TEMPERATURE: 98.5 F | OXYGEN SATURATION: 98 % | HEIGHT: 67 IN | HEART RATE: 76 BPM | SYSTOLIC BLOOD PRESSURE: 160 MMHG

## 2019-09-09 PROCEDURE — 93000 ELECTROCARDIOGRAM COMPLETE: CPT

## 2019-09-09 PROCEDURE — 99214 OFFICE O/P EST MOD 30 MIN: CPT

## 2019-09-09 RX ORDER — TADALAFIL 20 MG/1
20 TABLET ORAL DAILY
Qty: 5 | Refills: 0 | Status: ACTIVE | COMMUNITY
Start: 2017-11-28 | End: 1900-01-01

## 2019-09-09 NOTE — REASON FOR VISIT
[FreeTextEntry1] : Dear Dr. Orosco:\par \par I had the pleasure of evaluating your patient, Mr. Michael Lee, for follow-up cardiovascular evaluation.  I last saw him in the office in March 2019. Of note, he has also noticeably lost weight.  He does report having exertional dyspnea after a high level of exercise.  His last cardiovascular evaluation was performed in 2017, when an echocardiogram demonstrated normal biventricular systolic function LVEF 65-70%, mild TR, borderline pulmonary HTN (PASP 36 mmHg).  \par \par As you know, he is a 63 yo man with history of hypertension, hyperlipidemia, prediabetes, GERD, anxiety, borderline pulmonary HTN noted on his last echocardiogram who previously underwent left total hip replacement and then cholecystectomy without issues.  \par \par Review of his current regimen includes daily low dose aspirin, lisinopril 20/HCTZ 25 daily.  He had previously stopped taking labetalol.  Although his BPs are elevated in the office today, he notes having normal BPs at home or even in the office.  \par \par At this time, will recommend the following:\par 1. Echocardiogram\par 2. Nuclear stress test to assess for ischemia.\par 3. If an additional BP agent is needed, can consider restarting the beta-blocker.\par \par Thank you for entrusting his care with us.\par \par Warmest regards,\par Ryan Suarez\par \par \par 12-lead ECG demonstrates sinus rhythm, normal axis, normal intervals.  Physical exam is unremarkable.\par \par I have also arranged for him to get an echocardiogram.\par \par Thank you for entrusting his care with me. \par \par Warmest regards,\par Ryan Suarez\par \par

## 2019-09-09 NOTE — PHYSICAL EXAM
[General Appearance - Well Developed] : well developed [Normal Appearance] : normal appearance [Well Groomed] : well groomed [General Appearance - Well Nourished] : well nourished [General Appearance - In No Acute Distress] : no acute distress [No Deformities] : no deformities [Normal Conjunctiva] : the conjunctiva exhibited no abnormalities [Normal Oral Mucosa] : normal oral mucosa [Eyelids - No Xanthelasma] : the eyelids demonstrated no xanthelasmas [No Oral Pallor] : no oral pallor [No Oral Cyanosis] : no oral cyanosis [Normal Jugular Venous A Waves Present] : normal jugular venous A waves present [Normal Jugular Venous V Waves Present] : normal jugular venous V waves present [No Jugular Venous Romero A Waves] : no jugular venous romero A waves [Respiration, Rhythm And Depth] : normal respiratory rhythm and effort [Exaggerated Use Of Accessory Muscles For Inspiration] : no accessory muscle use [Auscultation Breath Sounds / Voice Sounds] : lungs were clear to auscultation bilaterally [Heart Rate And Rhythm] : heart rate and rhythm were normal [Heart Sounds] : normal S1 and S2 [Murmurs] : no murmurs present [Abdomen Soft] : soft [Abdomen Tenderness] : non-tender [Abdomen Mass (___ Cm)] : no abdominal mass palpated [Abnormal Walk] : normal gait [Nail Clubbing] : no clubbing of the fingernails [Gait - Sufficient For Exercise Testing] : the gait was sufficient for exercise testing [Petechial Hemorrhages (___cm)] : no petechial hemorrhages [Cyanosis, Localized] : no localized cyanosis [Skin Color & Pigmentation] : normal skin color and pigmentation [No Venous Stasis] : no venous stasis [] : no rash [Skin Lesions] : no skin lesions [No Skin Ulcers] : no skin ulcer [No Xanthoma] : no  xanthoma was observed [Oriented To Time, Place, And Person] : oriented to person, place, and time [Affect] : the affect was normal [Mood] : the mood was normal [No Anxiety] : not feeling anxious

## 2019-10-17 ENCOUNTER — APPOINTMENT (OUTPATIENT)
Dept: INTERNAL MEDICINE | Facility: CLINIC | Age: 64
End: 2019-10-17
Payer: MEDICAID

## 2019-10-17 VITALS
WEIGHT: 220 LBS | RESPIRATION RATE: 16 BRPM | DIASTOLIC BLOOD PRESSURE: 80 MMHG | BODY MASS INDEX: 34.53 KG/M2 | SYSTOLIC BLOOD PRESSURE: 140 MMHG | HEIGHT: 67 IN | HEART RATE: 88 BPM | TEMPERATURE: 98.5 F | OXYGEN SATURATION: 97 %

## 2019-10-17 DIAGNOSIS — Z23 ENCOUNTER FOR IMMUNIZATION: ICD-10-CM

## 2019-10-17 DIAGNOSIS — K21.9 GASTRO-ESOPHAGEAL REFLUX DISEASE W/OUT ESOPHAGITIS: ICD-10-CM

## 2019-10-17 DIAGNOSIS — J45.909 UNSPECIFIED ASTHMA, UNCOMPLICATED: ICD-10-CM

## 2019-10-17 PROCEDURE — 90670 PCV13 VACCINE IM: CPT

## 2019-10-17 PROCEDURE — G0009: CPT

## 2019-10-17 PROCEDURE — 99214 OFFICE O/P EST MOD 30 MIN: CPT | Mod: 25

## 2019-10-17 RX ORDER — KETOCONAZOLE 20.5 MG/ML
2 SHAMPOO, SUSPENSION TOPICAL
Qty: 1 | Refills: 2 | Status: DISCONTINUED | COMMUNITY
Start: 2018-06-14 | End: 2019-10-17

## 2019-10-17 RX ORDER — UREA 40 G/100G
40 CREAM TOPICAL DAILY
Qty: 1 | Refills: 3 | Status: DISCONTINUED | COMMUNITY
Start: 2019-03-05 | End: 2019-10-17

## 2019-10-17 RX ORDER — HYDROCORTISONE 1 %
12 CREAM (GRAM) TOPICAL TWICE DAILY
Qty: 1 | Refills: 3 | Status: DISCONTINUED | COMMUNITY
Start: 2018-05-31 | End: 2019-10-17

## 2019-10-17 RX ORDER — TESTOSTERONE 30 MG/1.5ML
30 SOLUTION TOPICAL
Qty: 1 | Refills: 0 | Status: DISCONTINUED | COMMUNITY
Start: 2017-11-28 | End: 2019-10-17

## 2019-10-17 NOTE — ASSESSMENT
[FreeTextEntry1] : 64 year old male found to have stable Essential Hypertension, Hypercholesterolemia with Hypertriglyceridemia, GERD without esophagitis, Elevated Hemoglobin A1c, Allergic Bronchitis, Allergic Rhinosinusitis,with the current regimen, diet and life style modifications, as counseled. Prior results reviewed and discussed with the patient during today's examination. Plan as ordered.\par Patient was recently evaluated by [facility] , findings and recommendations reviewed with the patient during today's examination.\par

## 2019-10-17 NOTE — HISTORY OF PRESENT ILLNESS
[de-identified] : 64 year old  male patient with history of stable Essential Hypertension, Hypercholesterolemia with Hypertriglyceridemia, GERD without esophagitis, Elevated Hemoglobin A1c, Allergic Bronchitis, Allergic Rhinosinusitis, history as stated, presented for follow up examination. Patient is compliant with all medications. Denies shortness of breath, chest pain or abdominal pains at this time. ROS as stated.\par

## 2019-10-21 ENCOUNTER — LABORATORY RESULT (OUTPATIENT)
Age: 64
End: 2019-10-21

## 2019-10-22 ENCOUNTER — APPOINTMENT (OUTPATIENT)
Dept: DERMATOLOGY | Facility: CLINIC | Age: 64
End: 2019-10-22
Payer: MEDICAID

## 2019-10-22 VITALS
BODY MASS INDEX: 34.53 KG/M2 | TEMPERATURE: 98.6 F | HEART RATE: 74 BPM | OXYGEN SATURATION: 95 % | SYSTOLIC BLOOD PRESSURE: 169 MMHG | HEIGHT: 67 IN | WEIGHT: 220 LBS | DIASTOLIC BLOOD PRESSURE: 94 MMHG

## 2019-10-22 DIAGNOSIS — B07.8 OTHER VIRAL WARTS: ICD-10-CM

## 2019-10-22 DIAGNOSIS — L02.92 FURUNCLE, UNSPECIFIED: ICD-10-CM

## 2019-10-22 DIAGNOSIS — L02.91 CUTANEOUS ABSCESS, UNSPECIFIED: ICD-10-CM

## 2019-10-22 DIAGNOSIS — B35.4 TINEA CORPORIS: ICD-10-CM

## 2019-10-22 LAB
ALBUMIN SERPL ELPH-MCNC: 4.8 G/DL
ALP BLD-CCNC: 62 U/L
ALT SERPL-CCNC: 21 U/L
ANION GAP SERPL CALC-SCNC: 13 MMOL/L
AST SERPL-CCNC: 17 U/L
BASOPHILS # BLD AUTO: 0.03 K/UL
BASOPHILS NFR BLD AUTO: 0.5 %
BILIRUB SERPL-MCNC: 0.2 MG/DL
BUN SERPL-MCNC: 26 MG/DL
CALCIUM SERPL-MCNC: 9.3 MG/DL
CHLORIDE SERPL-SCNC: 103 MMOL/L
CHOLEST SERPL-MCNC: 181 MG/DL
CHOLEST/HDLC SERPL: 4 RATIO
CO2 SERPL-SCNC: 23 MMOL/L
CREAT SERPL-MCNC: 1.03 MG/DL
EOSINOPHIL # BLD AUTO: 0.23 K/UL
EOSINOPHIL NFR BLD AUTO: 3.6 %
ESTIMATED AVERAGE GLUCOSE: 117 MG/DL
GGT SERPL-CCNC: 21 U/L
GLUCOSE SERPL-MCNC: 126 MG/DL
HBA1C MFR BLD HPLC: 5.7 %
HCT VFR BLD CALC: 37.9 %
HDLC SERPL-MCNC: 45 MG/DL
HGB BLD-MCNC: 12.5 G/DL
IMM GRANULOCYTES NFR BLD AUTO: 0.8 %
LDLC SERPL CALC-MCNC: 108 MG/DL
LYMPHOCYTES # BLD AUTO: 2.15 K/UL
LYMPHOCYTES NFR BLD AUTO: 34 %
MAN DIFF?: NORMAL
MCHC RBC-ENTMCNC: 29.9 PG
MCHC RBC-ENTMCNC: 33 GM/DL
MCV RBC AUTO: 90.7 FL
MONOCYTES # BLD AUTO: 0.5 K/UL
MONOCYTES NFR BLD AUTO: 7.9 %
NEUTROPHILS # BLD AUTO: 3.37 K/UL
NEUTROPHILS NFR BLD AUTO: 53.2 %
PLATELET # BLD AUTO: 250 K/UL
POTASSIUM SERPL-SCNC: 4.2 MMOL/L
PROT SERPL-MCNC: 7.2 G/DL
RBC # BLD: 4.18 M/UL
RBC # FLD: 13.5 %
SODIUM SERPL-SCNC: 139 MMOL/L
TRIGL SERPL-MCNC: 142 MG/DL
WBC # FLD AUTO: 6.33 K/UL

## 2019-10-22 PROCEDURE — 10060 I&D ABSCESS SIMPLE/SINGLE: CPT

## 2019-10-22 PROCEDURE — 17110 DESTRUCTION B9 LES UP TO 14: CPT

## 2019-10-22 PROCEDURE — 99214 OFFICE O/P EST MOD 30 MIN: CPT | Mod: 25

## 2019-10-22 RX ORDER — TERBINAFINE HYDROCHLORIDE 250 MG/1
250 TABLET ORAL
Qty: 14 | Refills: 0 | Status: ACTIVE | COMMUNITY
Start: 2019-10-22 | End: 1900-01-01

## 2019-10-22 RX ORDER — DOXYCYCLINE HYCLATE 100 MG/1
100 TABLET ORAL
Qty: 10 | Refills: 0 | Status: ACTIVE | COMMUNITY
Start: 2019-10-22 | End: 1900-01-01

## 2019-11-17 ENCOUNTER — RX RENEWAL (OUTPATIENT)
Age: 64
End: 2019-11-17

## 2019-12-09 ENCOUNTER — RX RENEWAL (OUTPATIENT)
Age: 64
End: 2019-12-09

## 2019-12-19 ENCOUNTER — OTHER (OUTPATIENT)
Age: 64
End: 2019-12-19

## 2020-01-07 ENCOUNTER — APPOINTMENT (OUTPATIENT)
Dept: HUMAN REPRODUCTION | Facility: CLINIC | Age: 65
End: 2020-01-07

## 2020-01-09 ENCOUNTER — RX RENEWAL (OUTPATIENT)
Age: 65
End: 2020-01-09

## 2020-01-09 RX ORDER — ALBUTEROL SULFATE 90 UG/1
108 (90 BASE) AEROSOL, METERED RESPIRATORY (INHALATION)
Qty: 1 | Refills: 5 | Status: ACTIVE | COMMUNITY
Start: 2018-09-03 | End: 1900-01-01

## 2020-01-23 ENCOUNTER — OUTPATIENT (OUTPATIENT)
Dept: OUTPATIENT SERVICES | Facility: HOSPITAL | Age: 65
LOS: 1 days | End: 2020-01-23
Payer: MEDICAID

## 2020-01-23 DIAGNOSIS — Z96.642 PRESENCE OF LEFT ARTIFICIAL HIP JOINT: Chronic | ICD-10-CM

## 2020-01-23 DIAGNOSIS — Z98.890 OTHER SPECIFIED POSTPROCEDURAL STATES: Chronic | ICD-10-CM

## 2020-01-23 DIAGNOSIS — K60.3 ANAL FISTULA: Chronic | ICD-10-CM

## 2020-01-23 DIAGNOSIS — Z13.6 ENCOUNTER FOR SCREENING FOR CARDIOVASCULAR DISORDERS: ICD-10-CM

## 2020-01-23 PROCEDURE — 93017 CV STRESS TEST TRACING ONLY: CPT

## 2020-01-23 PROCEDURE — 93016 CV STRESS TEST SUPVJ ONLY: CPT

## 2020-01-23 PROCEDURE — 78452 HT MUSCLE IMAGE SPECT MULT: CPT | Mod: 26

## 2020-01-23 PROCEDURE — 93018 CV STRESS TEST I&R ONLY: CPT

## 2020-02-13 ENCOUNTER — APPOINTMENT (OUTPATIENT)
Dept: INTERNAL MEDICINE | Facility: CLINIC | Age: 65
End: 2020-02-13

## 2020-02-18 RX ORDER — KETOCONAZOLE 20 MG/G
2 CREAM TOPICAL
Qty: 1 | Refills: 0 | Status: ACTIVE | COMMUNITY
Start: 2020-01-16 | End: 1900-01-01

## 2020-02-20 LAB
ANION GAP SERPL CALC-SCNC: 13 MMOL/L
BUN SERPL-MCNC: 19 MG/DL
CALCIUM SERPL-MCNC: 9.1 MG/DL
CHLORIDE SERPL-SCNC: 100 MMOL/L
CO2 SERPL-SCNC: 25 MMOL/L
CREAT SERPL-MCNC: 1.03 MG/DL
GLUCOSE SERPL-MCNC: 131 MG/DL
POTASSIUM SERPL-SCNC: 4.2 MMOL/L
SODIUM SERPL-SCNC: 138 MMOL/L

## 2020-02-26 ENCOUNTER — APPOINTMENT (OUTPATIENT)
Dept: CARDIOLOGY | Facility: CLINIC | Age: 65
End: 2020-02-26

## 2020-02-26 ENCOUNTER — OUTPATIENT (OUTPATIENT)
Dept: OUTPATIENT SERVICES | Facility: HOSPITAL | Age: 65
LOS: 1 days | End: 2020-02-26
Payer: MEDICAID

## 2020-02-26 DIAGNOSIS — K60.3 ANAL FISTULA: Chronic | ICD-10-CM

## 2020-02-26 DIAGNOSIS — R94.39 ABNORMAL RESULT OF OTHER CARDIOVASCULAR FUNCTION STUDY: ICD-10-CM

## 2020-02-26 DIAGNOSIS — I49.9 CARDIAC ARRHYTHMIA, UNSPECIFIED: ICD-10-CM

## 2020-02-26 DIAGNOSIS — Z98.890 OTHER SPECIFIED POSTPROCEDURAL STATES: Chronic | ICD-10-CM

## 2020-02-26 DIAGNOSIS — R06.09 OTHER FORMS OF DYSPNEA: ICD-10-CM

## 2020-02-26 DIAGNOSIS — Z96.642 PRESENCE OF LEFT ARTIFICIAL HIP JOINT: Chronic | ICD-10-CM

## 2020-02-26 PROCEDURE — 75574 CT ANGIO HRT W/3D IMAGE: CPT | Mod: 26

## 2020-02-26 PROCEDURE — 75574 CT ANGIO HRT W/3D IMAGE: CPT

## 2020-03-06 ENCOUNTER — OUTPATIENT (OUTPATIENT)
Dept: OUTPATIENT SERVICES | Facility: HOSPITAL | Age: 65
LOS: 1 days | Discharge: HOME | End: 2020-03-06
Payer: MEDICAID

## 2020-03-06 DIAGNOSIS — Z96.642 PRESENCE OF LEFT ARTIFICIAL HIP JOINT: Chronic | ICD-10-CM

## 2020-03-06 DIAGNOSIS — Z98.890 OTHER SPECIFIED POSTPROCEDURAL STATES: Chronic | ICD-10-CM

## 2020-03-06 DIAGNOSIS — K60.3 ANAL FISTULA: Chronic | ICD-10-CM

## 2020-03-06 DIAGNOSIS — R07.89 OTHER CHEST PAIN: ICD-10-CM

## 2020-03-06 LAB
ANION GAP SERPL CALC-SCNC: 15 MMO/L — HIGH (ref 7–14)
BUN SERPL-MCNC: 23 MG/DL — SIGNIFICANT CHANGE UP (ref 7–23)
CALCIUM SERPL-MCNC: 9.2 MG/DL — SIGNIFICANT CHANGE UP (ref 8.4–10.5)
CHLORIDE SERPL-SCNC: 100 MMOL/L — SIGNIFICANT CHANGE UP (ref 98–107)
CO2 SERPL-SCNC: 24 MMOL/L — SIGNIFICANT CHANGE UP (ref 22–31)
CREAT SERPL-MCNC: 1.03 MG/DL — SIGNIFICANT CHANGE UP (ref 0.5–1.3)
GLUCOSE SERPL-MCNC: 138 MG/DL — HIGH (ref 70–99)
HBA1C BLD-MCNC: 5.4 % — SIGNIFICANT CHANGE UP (ref 4–5.6)
HCT VFR BLD CALC: 36.9 % — LOW (ref 39–50)
HGB BLD-MCNC: 12.6 G/DL — LOW (ref 13–17)
MCHC RBC-ENTMCNC: 30.4 PG — SIGNIFICANT CHANGE UP (ref 27–34)
MCHC RBC-ENTMCNC: 34.1 % — SIGNIFICANT CHANGE UP (ref 32–36)
MCV RBC AUTO: 89.1 FL — SIGNIFICANT CHANGE UP (ref 80–100)
NRBC # FLD: 0 K/UL — SIGNIFICANT CHANGE UP (ref 0–0)
PLATELET # BLD AUTO: 286 K/UL — SIGNIFICANT CHANGE UP (ref 150–400)
PMV BLD: 9.2 FL — SIGNIFICANT CHANGE UP (ref 7–13)
POTASSIUM SERPL-MCNC: 3.3 MMOL/L — LOW (ref 3.5–5.3)
POTASSIUM SERPL-SCNC: 3.3 MMOL/L — LOW (ref 3.5–5.3)
RBC # BLD: 4.14 M/UL — LOW (ref 4.2–5.8)
RBC # FLD: 13.2 % — SIGNIFICANT CHANGE UP (ref 10.3–14.5)
SODIUM SERPL-SCNC: 139 MMOL/L — SIGNIFICANT CHANGE UP (ref 135–145)
WBC # BLD: 6.73 K/UL — SIGNIFICANT CHANGE UP (ref 3.8–10.5)
WBC # FLD AUTO: 6.73 K/UL — SIGNIFICANT CHANGE UP (ref 3.8–10.5)

## 2020-03-06 PROCEDURE — 93010 ELECTROCARDIOGRAM REPORT: CPT | Mod: 76

## 2020-03-06 PROCEDURE — 99152 MOD SED SAME PHYS/QHP 5/>YRS: CPT

## 2020-03-06 PROCEDURE — 93458 L HRT ARTERY/VENTRICLE ANGIO: CPT | Mod: 26,59

## 2020-03-06 PROCEDURE — 92928 PRQ TCAT PLMT NTRAC ST 1 LES: CPT | Mod: RC

## 2020-03-06 RX ORDER — ACETAMINOPHEN 500 MG
650 TABLET ORAL ONCE
Refills: 0 | Status: COMPLETED | OUTPATIENT
Start: 2020-03-06 | End: 2020-03-06

## 2020-03-06 RX ORDER — CLOPIDOGREL BISULFATE 75 MG/1
1 TABLET, FILM COATED ORAL
Qty: 30 | Refills: 0
Start: 2020-03-06 | End: 2020-04-04

## 2020-03-06 RX ORDER — ATORVASTATIN CALCIUM 80 MG/1
1 TABLET, FILM COATED ORAL
Qty: 30 | Refills: 0
Start: 2020-03-06 | End: 2020-04-04

## 2020-03-06 RX ORDER — ASPIRIN/CALCIUM CARB/MAGNESIUM 324 MG
1 TABLET ORAL
Qty: 0 | Refills: 0 | DISCHARGE

## 2020-03-06 RX ORDER — FLUTICASONE PROPIONATE 50 MCG
1 SPRAY, SUSPENSION NASAL
Qty: 0 | Refills: 0 | DISCHARGE

## 2020-03-06 RX ORDER — POLYETHYLENE GLYCOL 3350 17 G/17G
17 POWDER, FOR SOLUTION ORAL ONCE
Refills: 0 | Status: DISCONTINUED | OUTPATIENT
Start: 2020-03-06 | End: 2020-03-06

## 2020-03-06 RX ORDER — SODIUM CHLORIDE 9 MG/ML
3 INJECTION INTRAMUSCULAR; INTRAVENOUS; SUBCUTANEOUS EVERY 8 HOURS
Refills: 0 | Status: DISCONTINUED | OUTPATIENT
Start: 2020-03-06 | End: 2020-03-21

## 2020-03-06 RX ORDER — POTASSIUM CHLORIDE 20 MEQ
40 PACKET (EA) ORAL ONCE
Refills: 0 | Status: COMPLETED | OUTPATIENT
Start: 2020-03-06 | End: 2020-03-06

## 2020-03-06 RX ORDER — ASPIRIN/CALCIUM CARB/MAGNESIUM 324 MG
1 TABLET ORAL
Qty: 30 | Refills: 0
Start: 2020-03-06 | End: 2020-04-04

## 2020-03-06 RX ORDER — CLOPIDOGREL BISULFATE 75 MG/1
75 TABLET, FILM COATED ORAL DAILY
Refills: 0 | Status: DISCONTINUED | OUTPATIENT
Start: 2020-03-06 | End: 2020-03-21

## 2020-03-06 RX ADMIN — CLOPIDOGREL BISULFATE 75 MILLIGRAM(S): 75 TABLET, FILM COATED ORAL at 14:31

## 2020-03-06 RX ADMIN — Medication 650 MILLIGRAM(S): at 15:06

## 2020-03-06 RX ADMIN — Medication 40 MILLIEQUIVALENT(S): at 09:00

## 2020-03-06 RX ADMIN — Medication 650 MILLIGRAM(S): at 14:30

## 2020-03-06 NOTE — H&P CARDIOLOGY - NEGATIVE CARDIOVASCULAR SYMPTOMS
no orthopnea/no peripheral edema/no palpitations/no claudication/no chest pain/no paroxysmal nocturnal dyspnea

## 2020-03-06 NOTE — H&P CARDIOLOGY - HISTORY OF PRESENT ILLNESS
63 y/o M w/ PMH of HTN, HLD and GERD presents for cardiac catheretization.  Pt had an exercise stress test which did not show any ECG evidence of ischemia however patient had late hypotensive response concerning for significant coronary artery disease. Pt was then sent for a CT of the coronaries which revealed severe stenosis (>95%) of the mid RCA and severe stenosis of the ostial D1. 63 y/o M w/ PMH of HTN, HLD, Anxiety, Asthma and GERD presents for cardiac catheretization. Pt states that he has been experiencing occasional dyspnea on exertion but was unsure if it was caused by his asthma. Pt was sent for an exercise stress test which did not show any ECG evidence of ischemia however patient had late hypotensive response concerning for significant coronary artery disease. Pt was then sent for a CT of the coronaries which revealed severe stenosis (>95%) of the mid RCA and severe stenosis of the ostial D1. Pt denies N/V/D, fevers, chills, cough, palpitations, chest pain, syncope, orthopnea, nocturnal paroxysmal dyspnea, edema, cyanosis, hypertension, heart murmurs, varicosities, phlebitis, claudication.

## 2020-03-06 NOTE — H&P CARDIOLOGY - RS GEN PE MLT RESP DETAILS PC
breath sounds equal/respirations non-labored/no chest wall tenderness/good air movement/airway patent/clear to auscultation bilaterally

## 2020-03-25 ENCOUNTER — APPOINTMENT (OUTPATIENT)
Dept: CARDIOLOGY | Facility: CLINIC | Age: 65
End: 2020-03-25
Payer: MEDICAID

## 2020-03-25 VITALS
DIASTOLIC BLOOD PRESSURE: 77 MMHG | SYSTOLIC BLOOD PRESSURE: 132 MMHG | BODY MASS INDEX: 35.31 KG/M2 | HEART RATE: 72 BPM | WEIGHT: 225 LBS | OXYGEN SATURATION: 97 % | HEIGHT: 67 IN

## 2020-03-25 PROCEDURE — 99214 OFFICE O/P EST MOD 30 MIN: CPT

## 2020-03-25 PROCEDURE — 93000 ELECTROCARDIOGRAM COMPLETE: CPT

## 2020-04-15 ENCOUNTER — EMERGENCY (EMERGENCY)
Facility: HOSPITAL | Age: 65
LOS: 1 days | Discharge: HOME | End: 2020-04-15
Attending: EMERGENCY MEDICINE
Payer: MEDICAID

## 2020-04-15 VITALS
WEIGHT: 199.96 LBS | SYSTOLIC BLOOD PRESSURE: 138 MMHG | RESPIRATION RATE: 16 BRPM | HEART RATE: 78 BPM | OXYGEN SATURATION: 98 % | DIASTOLIC BLOOD PRESSURE: 80 MMHG | TEMPERATURE: 98 F

## 2020-04-15 DIAGNOSIS — K60.3 ANAL FISTULA: Chronic | ICD-10-CM

## 2020-04-15 DIAGNOSIS — Z98.890 OTHER SPECIFIED POSTPROCEDURAL STATES: Chronic | ICD-10-CM

## 2020-04-15 DIAGNOSIS — Z96.642 PRESENCE OF LEFT ARTIFICIAL HIP JOINT: Chronic | ICD-10-CM

## 2020-04-15 PROCEDURE — 99282 EMERGENCY DEPT VISIT SF MDM: CPT

## 2020-04-15 NOTE — ED PROVIDER NOTE - CLINICAL SUMMARY MEDICAL DECISION MAKING FREE TEXT BOX
given pt sxs resolved over week ago will dc.  The care provided to this patient during the current state of emergency is provided "in support of the state's response to the COVID-19 outbreak".

## 2020-04-15 NOTE — ED PROVIDER NOTE - NSFOLLOWUPINSTRUCTIONS_ED_ALL_ED_FT
IMPORTANT INSTRUCTIONS FROM Dr. MURILLO:    Please follow up with your personal medical doctor in 24-48 hours by phone.    If you were advised to take any medications - be sure to review the package insert.    If your symptoms change, get worse or if you have any new symptoms, come to the ER right away.  If you have any questions, call the ER at the phone number on this page.

## 2020-04-15 NOTE — ED ADULT NURSE NOTE - CAS ELECT INFOMATION PROVIDED
pt evaluated, treated and discharged by Andrew DE LOS SANTOS. No nursing intervention needed. unable to sign discharge instruction due to COVID19 outbreak/DC instructions

## 2020-04-15 NOTE — ED PROVIDER NOTE - OBJECTIVE STATEMENT
64 male hx cad / asthma. had cough /  fever / sob / sore throat, 20 days ago, now feeling better. pt wants to be tested for covid 19 so that he can donate his plasma.

## 2020-06-17 ENCOUNTER — APPOINTMENT (OUTPATIENT)
Dept: CARDIOLOGY | Facility: CLINIC | Age: 65
End: 2020-06-17
Payer: MEDICAID

## 2020-06-17 VITALS — BODY MASS INDEX: 35.08 KG/M2 | WEIGHT: 224 LBS

## 2020-06-17 VITALS
TEMPERATURE: 97.6 F | HEART RATE: 77 BPM | SYSTOLIC BLOOD PRESSURE: 155 MMHG | HEIGHT: 67 IN | DIASTOLIC BLOOD PRESSURE: 88 MMHG | BODY MASS INDEX: 35.08 KG/M2 | OXYGEN SATURATION: 97 %

## 2020-06-17 PROCEDURE — 93000 ELECTROCARDIOGRAM COMPLETE: CPT

## 2020-06-17 PROCEDURE — 99214 OFFICE O/P EST MOD 30 MIN: CPT

## 2020-06-20 LAB
ALBUMIN SERPL ELPH-MCNC: 4.7 G/DL
ALP BLD-CCNC: 74 U/L
ALT SERPL-CCNC: 24 U/L
ANION GAP SERPL CALC-SCNC: 15 MMOL/L
AST SERPL-CCNC: 20 U/L
BASOPHILS # BLD AUTO: 0.03 K/UL
BASOPHILS NFR BLD AUTO: 0.5 %
BILIRUB SERPL-MCNC: 0.3 MG/DL
BUN SERPL-MCNC: 22 MG/DL
CALCIUM SERPL-MCNC: 9.5 MG/DL
CHLORIDE SERPL-SCNC: 101 MMOL/L
CHOLEST SERPL-MCNC: 165 MG/DL
CHOLEST/HDLC SERPL: 3.1 RATIO
CO2 SERPL-SCNC: 25 MMOL/L
CREAT SERPL-MCNC: 0.94 MG/DL
EOSINOPHIL # BLD AUTO: 0.2 K/UL
EOSINOPHIL NFR BLD AUTO: 3.2 %
ESTIMATED AVERAGE GLUCOSE: 114 MG/DL
GLUCOSE SERPL-MCNC: 123 MG/DL
HBA1C MFR BLD HPLC: 5.6 %
HCT VFR BLD CALC: 40.3 %
HDLC SERPL-MCNC: 54 MG/DL
HGB BLD-MCNC: 12.8 G/DL
IMM GRANULOCYTES NFR BLD AUTO: 0.5 %
LDLC SERPL CALC-MCNC: 78 MG/DL
LYMPHOCYTES # BLD AUTO: 1.52 K/UL
LYMPHOCYTES NFR BLD AUTO: 24.2 %
MAN DIFF?: NORMAL
MCHC RBC-ENTMCNC: 29.1 PG
MCHC RBC-ENTMCNC: 31.8 GM/DL
MCV RBC AUTO: 91.6 FL
MONOCYTES # BLD AUTO: 0.42 K/UL
MONOCYTES NFR BLD AUTO: 6.7 %
NEUTROPHILS # BLD AUTO: 4.09 K/UL
NEUTROPHILS NFR BLD AUTO: 64.9 %
PLATELET # BLD AUTO: 274 K/UL
POTASSIUM SERPL-SCNC: 4 MMOL/L
PROT SERPL-MCNC: 7.1 G/DL
RBC # BLD: 4.4 M/UL
RBC # FLD: 14.1 %
SARS-COV-2 IGG SERPL IA-ACNC: 6.76 INDEX
SARS-COV-2 IGG SERPL QL IA: POSITIVE
SODIUM SERPL-SCNC: 141 MMOL/L
TRIGL SERPL-MCNC: 163 MG/DL
TSH SERPL-ACNC: 0.61 UIU/ML
WBC # FLD AUTO: 6.29 K/UL

## 2020-07-06 ENCOUNTER — RX RENEWAL (OUTPATIENT)
Age: 65
End: 2020-07-06

## 2020-09-02 NOTE — ASU PREOP CHECKLIST - BOWEL PREP
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   
n/a

## 2020-09-16 ENCOUNTER — APPOINTMENT (OUTPATIENT)
Dept: CARDIOLOGY | Facility: CLINIC | Age: 65
End: 2020-09-16
Payer: MEDICAID

## 2020-09-16 VITALS
TEMPERATURE: 97.2 F | SYSTOLIC BLOOD PRESSURE: 145 MMHG | OXYGEN SATURATION: 97 % | HEIGHT: 67 IN | HEART RATE: 70 BPM | BODY MASS INDEX: 35.24 KG/M2 | DIASTOLIC BLOOD PRESSURE: 86 MMHG

## 2020-09-16 VITALS — WEIGHT: 225 LBS | BODY MASS INDEX: 35.24 KG/M2

## 2020-09-16 PROCEDURE — 93000 ELECTROCARDIOGRAM COMPLETE: CPT

## 2020-09-16 PROCEDURE — 99214 OFFICE O/P EST MOD 30 MIN: CPT

## 2020-12-14 ENCOUNTER — APPOINTMENT (OUTPATIENT)
Dept: DERMATOLOGY | Facility: CLINIC | Age: 65
End: 2020-12-14

## 2021-01-13 ENCOUNTER — APPOINTMENT (OUTPATIENT)
Dept: CARDIOLOGY | Facility: CLINIC | Age: 66
End: 2021-01-13
Payer: MEDICAID

## 2021-01-13 VITALS
DIASTOLIC BLOOD PRESSURE: 85 MMHG | SYSTOLIC BLOOD PRESSURE: 159 MMHG | BODY MASS INDEX: 36.26 KG/M2 | TEMPERATURE: 96.9 F | WEIGHT: 231 LBS | HEIGHT: 67 IN | HEART RATE: 65 BPM | OXYGEN SATURATION: 98 %

## 2021-01-13 DIAGNOSIS — R73.09 OTHER ABNORMAL GLUCOSE: ICD-10-CM

## 2021-01-13 PROCEDURE — 99214 OFFICE O/P EST MOD 30 MIN: CPT

## 2021-01-13 PROCEDURE — 93000 ELECTROCARDIOGRAM COMPLETE: CPT

## 2021-01-13 PROCEDURE — 99072 ADDL SUPL MATRL&STAF TM PHE: CPT

## 2021-01-15 LAB
ALBUMIN SERPL ELPH-MCNC: 5.2 G/DL
ALP BLD-CCNC: 75 U/L
ALT SERPL-CCNC: 22 U/L
ANION GAP SERPL CALC-SCNC: 16 MMOL/L
AST SERPL-CCNC: 18 U/L
BASOPHILS # BLD AUTO: 0.03 K/UL
BASOPHILS NFR BLD AUTO: 0.4 %
BILIRUB SERPL-MCNC: 0.4 MG/DL
BUN SERPL-MCNC: 27 MG/DL
CALCIUM SERPL-MCNC: 9.6 MG/DL
CHLORIDE SERPL-SCNC: 100 MMOL/L
CHOLEST SERPL-MCNC: 151 MG/DL
CO2 SERPL-SCNC: 23 MMOL/L
CREAT SERPL-MCNC: 1.17 MG/DL
EOSINOPHIL # BLD AUTO: 0.13 K/UL
EOSINOPHIL NFR BLD AUTO: 1.9 %
ESTIMATED AVERAGE GLUCOSE: 120 MG/DL
GLUCOSE SERPL-MCNC: 161 MG/DL
HBA1C MFR BLD HPLC: 5.8 %
HCT VFR BLD CALC: 39.3 %
HDLC SERPL-MCNC: 52 MG/DL
HGB BLD-MCNC: 12.5 G/DL
IMM GRANULOCYTES NFR BLD AUTO: 0.3 %
LDLC SERPL CALC-MCNC: 82 MG/DL
LYMPHOCYTES # BLD AUTO: 1.72 K/UL
LYMPHOCYTES NFR BLD AUTO: 24.8 %
MAN DIFF?: NORMAL
MCHC RBC-ENTMCNC: 29.8 PG
MCHC RBC-ENTMCNC: 31.8 GM/DL
MCV RBC AUTO: 93.6 FL
MONOCYTES # BLD AUTO: 0.53 K/UL
MONOCYTES NFR BLD AUTO: 7.6 %
NEUTROPHILS # BLD AUTO: 4.5 K/UL
NEUTROPHILS NFR BLD AUTO: 65 %
NONHDLC SERPL-MCNC: 99 MG/DL
PLATELET # BLD AUTO: 292 K/UL
POTASSIUM SERPL-SCNC: 3.8 MMOL/L
PROT SERPL-MCNC: 7.3 G/DL
RBC # BLD: 4.2 M/UL
RBC # FLD: 13.2 %
SARS-COV-2 IGG SERPL IA-ACNC: 70.7 INDEX
SARS-COV-2 IGG SERPL QL IA: POSITIVE
SARS-COV-2 N GENE NPH QL NAA+PROBE: NOT DETECTED
SODIUM SERPL-SCNC: 139 MMOL/L
TRIGL SERPL-MCNC: 87 MG/DL
TSH SERPL-ACNC: 0.56 UIU/ML
WBC # FLD AUTO: 6.93 K/UL

## 2021-01-24 ENCOUNTER — INPATIENT (INPATIENT)
Facility: HOSPITAL | Age: 66
LOS: 0 days | Discharge: HOME | DRG: 311 | End: 2021-01-25
Attending: INTERNAL MEDICINE | Admitting: INTERNAL MEDICINE
Payer: MEDICAID

## 2021-01-24 VITALS
OXYGEN SATURATION: 97 % | DIASTOLIC BLOOD PRESSURE: 77 MMHG | HEIGHT: 68 IN | TEMPERATURE: 98 F | HEART RATE: 63 BPM | WEIGHT: 227.96 LBS | SYSTOLIC BLOOD PRESSURE: 162 MMHG

## 2021-01-24 DIAGNOSIS — K60.3 ANAL FISTULA: Chronic | ICD-10-CM

## 2021-01-24 DIAGNOSIS — Z98.890 OTHER SPECIFIED POSTPROCEDURAL STATES: Chronic | ICD-10-CM

## 2021-01-24 DIAGNOSIS — Z96.642 PRESENCE OF LEFT ARTIFICIAL HIP JOINT: Chronic | ICD-10-CM

## 2021-01-24 LAB
ALBUMIN SERPL ELPH-MCNC: 3.8 G/DL — SIGNIFICANT CHANGE UP (ref 3.5–5)
ALP SERPL-CCNC: 91 U/L — SIGNIFICANT CHANGE UP (ref 40–120)
ALT FLD-CCNC: 26 U/L DA — SIGNIFICANT CHANGE UP (ref 10–60)
ANION GAP SERPL CALC-SCNC: 11 MMOL/L — SIGNIFICANT CHANGE UP (ref 5–17)
APPEARANCE UR: CLEAR — SIGNIFICANT CHANGE UP
AST SERPL-CCNC: 19 U/L — SIGNIFICANT CHANGE UP (ref 10–40)
BASOPHILS # BLD AUTO: 0.02 K/UL — SIGNIFICANT CHANGE UP (ref 0–0.2)
BASOPHILS NFR BLD AUTO: 0.3 % — SIGNIFICANT CHANGE UP (ref 0–2)
BILIRUB SERPL-MCNC: 0.5 MG/DL — SIGNIFICANT CHANGE UP (ref 0.2–1.2)
BILIRUB UR-MCNC: NEGATIVE — SIGNIFICANT CHANGE UP
BUN SERPL-MCNC: 22 MG/DL — HIGH (ref 7–18)
CALCIUM SERPL-MCNC: 8.9 MG/DL — SIGNIFICANT CHANGE UP (ref 8.4–10.5)
CHLORIDE SERPL-SCNC: 103 MMOL/L — SIGNIFICANT CHANGE UP (ref 96–108)
CO2 SERPL-SCNC: 27 MMOL/L — SIGNIFICANT CHANGE UP (ref 22–31)
COLOR SPEC: YELLOW — SIGNIFICANT CHANGE UP
CREAT SERPL-MCNC: 1.07 MG/DL — SIGNIFICANT CHANGE UP (ref 0.5–1.3)
D DIMER BLD IA.RAPID-MCNC: 162 NG/ML DDU — SIGNIFICANT CHANGE UP
DIFF PNL FLD: NEGATIVE — SIGNIFICANT CHANGE UP
EOSINOPHIL # BLD AUTO: 0.15 K/UL — SIGNIFICANT CHANGE UP (ref 0–0.5)
EOSINOPHIL NFR BLD AUTO: 2.2 % — SIGNIFICANT CHANGE UP (ref 0–6)
GLUCOSE SERPL-MCNC: 105 MG/DL — HIGH (ref 70–99)
GLUCOSE UR QL: NEGATIVE — SIGNIFICANT CHANGE UP
HCT VFR BLD CALC: 34.7 % — LOW (ref 39–50)
HGB BLD-MCNC: 11.5 G/DL — LOW (ref 13–17)
IMM GRANULOCYTES NFR BLD AUTO: 0.4 % — SIGNIFICANT CHANGE UP (ref 0–1.5)
KETONES UR-MCNC: NEGATIVE — SIGNIFICANT CHANGE UP
LEUKOCYTE ESTERASE UR-ACNC: NEGATIVE — SIGNIFICANT CHANGE UP
LIDOCAIN IGE QN: 162 U/L — SIGNIFICANT CHANGE UP (ref 73–393)
LYMPHOCYTES # BLD AUTO: 2.14 K/UL — SIGNIFICANT CHANGE UP (ref 1–3.3)
LYMPHOCYTES # BLD AUTO: 30.7 % — SIGNIFICANT CHANGE UP (ref 13–44)
MCHC RBC-ENTMCNC: 29.4 PG — SIGNIFICANT CHANGE UP (ref 27–34)
MCHC RBC-ENTMCNC: 33.1 GM/DL — SIGNIFICANT CHANGE UP (ref 32–36)
MCV RBC AUTO: 88.7 FL — SIGNIFICANT CHANGE UP (ref 80–100)
MONOCYTES # BLD AUTO: 0.52 K/UL — SIGNIFICANT CHANGE UP (ref 0–0.9)
MONOCYTES NFR BLD AUTO: 7.5 % — SIGNIFICANT CHANGE UP (ref 2–14)
NEUTROPHILS # BLD AUTO: 4.1 K/UL — SIGNIFICANT CHANGE UP (ref 1.8–7.4)
NEUTROPHILS NFR BLD AUTO: 58.9 % — SIGNIFICANT CHANGE UP (ref 43–77)
NITRITE UR-MCNC: NEGATIVE — SIGNIFICANT CHANGE UP
NRBC # BLD: 0 /100 WBCS — SIGNIFICANT CHANGE UP (ref 0–0)
PH UR: 6 — SIGNIFICANT CHANGE UP (ref 5–8)
PLATELET # BLD AUTO: 261 K/UL — SIGNIFICANT CHANGE UP (ref 150–400)
POTASSIUM SERPL-MCNC: 3.5 MMOL/L — SIGNIFICANT CHANGE UP (ref 3.5–5.3)
POTASSIUM SERPL-SCNC: 3.5 MMOL/L — SIGNIFICANT CHANGE UP (ref 3.5–5.3)
PROT SERPL-MCNC: 7.4 G/DL — SIGNIFICANT CHANGE UP (ref 6–8.3)
PROT UR-MCNC: NEGATIVE — SIGNIFICANT CHANGE UP
RBC # BLD: 3.91 M/UL — LOW (ref 4.2–5.8)
RBC # FLD: 12.8 % — SIGNIFICANT CHANGE UP (ref 10.3–14.5)
SARS-COV-2 RNA SPEC QL NAA+PROBE: SIGNIFICANT CHANGE UP
SODIUM SERPL-SCNC: 141 MMOL/L — SIGNIFICANT CHANGE UP (ref 135–145)
SP GR SPEC: 1.01 — SIGNIFICANT CHANGE UP (ref 1.01–1.02)
TROPONIN I SERPL-MCNC: <0.015 NG/ML — SIGNIFICANT CHANGE UP (ref 0–0.04)
TROPONIN I SERPL-MCNC: <0.015 NG/ML — SIGNIFICANT CHANGE UP (ref 0–0.04)
UROBILINOGEN FLD QL: NEGATIVE — SIGNIFICANT CHANGE UP
WBC # BLD: 6.96 K/UL — SIGNIFICANT CHANGE UP (ref 3.8–10.5)
WBC # FLD AUTO: 6.96 K/UL — SIGNIFICANT CHANGE UP (ref 3.8–10.5)

## 2021-01-24 PROCEDURE — 74176 CT ABD & PELVIS W/O CONTRAST: CPT | Mod: 26

## 2021-01-24 PROCEDURE — 71045 X-RAY EXAM CHEST 1 VIEW: CPT | Mod: 26

## 2021-01-24 PROCEDURE — 99284 EMERGENCY DEPT VISIT MOD MDM: CPT

## 2021-01-24 PROCEDURE — 93010 ELECTROCARDIOGRAM REPORT: CPT

## 2021-01-24 RX ORDER — ASPIRIN/CALCIUM CARB/MAGNESIUM 324 MG
325 TABLET ORAL ONCE
Refills: 0 | Status: COMPLETED | OUTPATIENT
Start: 2021-01-24 | End: 2021-01-24

## 2021-01-24 RX ORDER — LABETALOL HCL 100 MG
100 TABLET ORAL ONCE
Refills: 0 | Status: COMPLETED | OUTPATIENT
Start: 2021-01-24 | End: 2021-01-24

## 2021-01-24 RX ADMIN — Medication 325 MILLIGRAM(S): at 23:48

## 2021-01-24 RX ADMIN — Medication 100 MILLIGRAM(S): at 23:48

## 2021-01-24 NOTE — ED PROVIDER NOTE - OBJECTIVE STATEMENT
64 y/o M patient with a significant PMHx of HTN, HLD, panic attacks, CAD, depression presents to the ED c/o pain above ziphioid notch. Patient reports discomfort is mild to moderate, dull and constant in nature. Patient states pain lasted for a brief period od time and when at home it was not exertional. Patient states he thinks it maybe related to him eating a salad but since he has a history of CAD he decided to come to the ED because he was worried about his heart. Patient follows up with cardiologist Dr. Ryan Suarez in [place] and last cardiac catheterization was in March 2020 with stents placed in RCA and 2 additional lesions (the biggest being 50% stenosis D1). Patient is scheduled for an electrocardiogram. Denies any radiation of pain, fevers, chills, diaphoresis, shortness of breath, cough, vomiting and diarrhea, UTI symptoms, or any other complaints. 66 y/o M patient with a significant PMHx of HTN, HLD, panic attacks, CAD, depression presents to the ED c/o pain above xiphoid notch. Patient reports discomfort is mild to moderate, dull and constant in nature. Patient states pain lasted for a brief period od time and when at home it was not exertional. Patient states he thinks it maybe related to him eating a salad but since he has a history of CAD he decided to come to the ED because he was worried about his heart. Patient follows up with cardiologist Dr. Ryan Suarze in [place] and last cardiac catheterization was in March 2020 with stents placed in RCA and 2 additional lesions (the biggest being 50% stenosis D1). Patient is scheduled for an electrocardiogram. Denies any radiation of pain, fevers, chills, diaphoresis, shortness of breath, cough, vomiting and diarrhea, UTI symptoms, or any other complaints.

## 2021-01-24 NOTE — ED PROVIDER NOTE - PROGRESS NOTE DETAILS
vm/page for dr maria victoria george - not yet returned still wo return call from cards. dw pt that trop x 2 neg but pt has cad and still risk to dc and would advise to stay in hospital to r/o acs and stress test/cath. we cannot be sure of the cause of his pain as everything thus far is non-diagnostic. pt wants to consider this longer. Naveed MERRILL: Received sign out from Dr. NIELS Morales. Pt with multiple cardiac risk factors. MAR and hospitalist endorsed. Pt agrees with admission for cardiac monitoring. I had a detailed discussion with the patient and/or guardian regarding the historical points, exam findings, and any diagnostic results supporting the admit diagnosis.

## 2021-01-24 NOTE — ED PROVIDER NOTE - CPE EDP GASTRO NORM
"Chief Complaint   Patient presents with     Pre-Op Exam     Tubal ligation on 11/15/2019 at the [facility] with Dr. Chao     Medication Reconciliation     completed.        /89   Pulse 98   Temp 98.3  F (36.8  C) (Oral)   Resp 18   Ht 1.55 m (5' 1.02\")   Wt 70.3 kg (155 lb)   LMP 08/01/2019   SpO2 98%   BMI 29.26 kg/m        Ok for flu shot  ~ Jefry (Erica) T. CMA      Tubal ligation on 11/15/2019 at the [facility] with Dr. Chao. Phone: [phone] Fax: [phone]    " normal...

## 2021-01-24 NOTE — ED PROVIDER NOTE - CLINICAL SUMMARY MEDICAL DECISION MAKING FREE TEXT BOX
Patient with non reproducible epigastric / lower chest pain, perhaps gastritis. Patient is now asymptomatic. Need to rule out ACS vs PE. Given time frame, kidney stone can be possible however less likely. Given the bandwidth like pain patient is referring to, pancreatitis is possible. Not consistent with aortic dissection. If workup is negative, patient requiring additional testing since pain is recent in onset and since resolved. Admit vs 4 hour troponin with rapid out patient cardiology.

## 2021-01-25 ENCOUNTER — EMERGENCY (EMERGENCY)
Facility: HOSPITAL | Age: 66
LOS: 1 days | Discharge: HOME | End: 2021-01-25
Attending: EMERGENCY MEDICINE
Payer: MEDICAID

## 2021-01-25 ENCOUNTER — TRANSCRIPTION ENCOUNTER (OUTPATIENT)
Age: 66
End: 2021-01-25

## 2021-01-25 VITALS
TEMPERATURE: 96 F | WEIGHT: 227.96 LBS | RESPIRATION RATE: 22 BRPM | OXYGEN SATURATION: 97 % | SYSTOLIC BLOOD PRESSURE: 200 MMHG | HEART RATE: 70 BPM | HEIGHT: 68 IN | DIASTOLIC BLOOD PRESSURE: 116 MMHG

## 2021-01-25 VITALS
SYSTOLIC BLOOD PRESSURE: 148 MMHG | HEART RATE: 68 BPM | RESPIRATION RATE: 18 BRPM | OXYGEN SATURATION: 97 % | DIASTOLIC BLOOD PRESSURE: 70 MMHG | TEMPERATURE: 98 F

## 2021-01-25 VITALS
SYSTOLIC BLOOD PRESSURE: 179 MMHG | OXYGEN SATURATION: 96 % | RESPIRATION RATE: 18 BRPM | HEART RATE: 70 BPM | DIASTOLIC BLOOD PRESSURE: 97 MMHG | TEMPERATURE: 97 F

## 2021-01-25 DIAGNOSIS — I25.10 ATHEROSCLEROTIC HEART DISEASE OF NATIVE CORONARY ARTERY WITHOUT ANGINA PECTORIS: ICD-10-CM

## 2021-01-25 DIAGNOSIS — R07.9 CHEST PAIN, UNSPECIFIED: ICD-10-CM

## 2021-01-25 DIAGNOSIS — E78.5 HYPERLIPIDEMIA, UNSPECIFIED: ICD-10-CM

## 2021-01-25 DIAGNOSIS — I24.9 ACUTE ISCHEMIC HEART DISEASE, UNSPECIFIED: ICD-10-CM

## 2021-01-25 DIAGNOSIS — Z96.642 PRESENCE OF LEFT ARTIFICIAL HIP JOINT: Chronic | ICD-10-CM

## 2021-01-25 DIAGNOSIS — Z98.890 OTHER SPECIFIED POSTPROCEDURAL STATES: Chronic | ICD-10-CM

## 2021-01-25 DIAGNOSIS — K60.3 ANAL FISTULA: Chronic | ICD-10-CM

## 2021-01-25 DIAGNOSIS — Z29.9 ENCOUNTER FOR PROPHYLACTIC MEASURES, UNSPECIFIED: ICD-10-CM

## 2021-01-25 DIAGNOSIS — I10 ESSENTIAL (PRIMARY) HYPERTENSION: ICD-10-CM

## 2021-01-25 LAB
ALBUMIN SERPL ELPH-MCNC: 3.8 G/DL — SIGNIFICANT CHANGE UP (ref 3.5–5)
ALP SERPL-CCNC: 84 U/L — SIGNIFICANT CHANGE UP (ref 40–120)
ALT FLD-CCNC: 26 U/L DA — SIGNIFICANT CHANGE UP (ref 10–60)
ANION GAP SERPL CALC-SCNC: 7 MMOL/L — SIGNIFICANT CHANGE UP (ref 5–17)
AST SERPL-CCNC: 20 U/L — SIGNIFICANT CHANGE UP (ref 10–40)
BASOPHILS # BLD AUTO: 0.04 K/UL — SIGNIFICANT CHANGE UP (ref 0–0.2)
BASOPHILS NFR BLD AUTO: 0.6 % — SIGNIFICANT CHANGE UP (ref 0–2)
BILIRUB SERPL-MCNC: 0.4 MG/DL — SIGNIFICANT CHANGE UP (ref 0.2–1.2)
BUN SERPL-MCNC: 19 MG/DL — HIGH (ref 7–18)
CALCIUM SERPL-MCNC: 8.9 MG/DL — SIGNIFICANT CHANGE UP (ref 8.4–10.5)
CHLORIDE SERPL-SCNC: 102 MMOL/L — SIGNIFICANT CHANGE UP (ref 96–108)
CO2 SERPL-SCNC: 29 MMOL/L — SIGNIFICANT CHANGE UP (ref 22–31)
CREAT SERPL-MCNC: 1.02 MG/DL — SIGNIFICANT CHANGE UP (ref 0.5–1.3)
EOSINOPHIL # BLD AUTO: 0.17 K/UL — SIGNIFICANT CHANGE UP (ref 0–0.5)
EOSINOPHIL NFR BLD AUTO: 2.6 % — SIGNIFICANT CHANGE UP (ref 0–6)
GLUCOSE SERPL-MCNC: 105 MG/DL — HIGH (ref 70–99)
HCT VFR BLD CALC: 35.7 % — LOW (ref 39–50)
HCV AB S/CO SERPL IA: 0.18 S/CO — SIGNIFICANT CHANGE UP (ref 0–0.99)
HCV AB SERPL-IMP: SIGNIFICANT CHANGE UP
HGB BLD-MCNC: 11.7 G/DL — LOW (ref 13–17)
IMM GRANULOCYTES NFR BLD AUTO: 0.3 % — SIGNIFICANT CHANGE UP (ref 0–1.5)
LYMPHOCYTES # BLD AUTO: 2.29 K/UL — SIGNIFICANT CHANGE UP (ref 1–3.3)
LYMPHOCYTES # BLD AUTO: 35.2 % — SIGNIFICANT CHANGE UP (ref 13–44)
MAGNESIUM SERPL-MCNC: 2.4 MG/DL — SIGNIFICANT CHANGE UP (ref 1.6–2.6)
MCHC RBC-ENTMCNC: 29.3 PG — SIGNIFICANT CHANGE UP (ref 27–34)
MCHC RBC-ENTMCNC: 32.8 GM/DL — SIGNIFICANT CHANGE UP (ref 32–36)
MCV RBC AUTO: 89.5 FL — SIGNIFICANT CHANGE UP (ref 80–100)
MONOCYTES # BLD AUTO: 0.38 K/UL — SIGNIFICANT CHANGE UP (ref 0–0.9)
MONOCYTES NFR BLD AUTO: 5.8 % — SIGNIFICANT CHANGE UP (ref 2–14)
NEUTROPHILS # BLD AUTO: 3.6 K/UL — SIGNIFICANT CHANGE UP (ref 1.8–7.4)
NEUTROPHILS NFR BLD AUTO: 55.5 % — SIGNIFICANT CHANGE UP (ref 43–77)
NRBC # BLD: 0 /100 WBCS — SIGNIFICANT CHANGE UP (ref 0–0)
PHOSPHATE SERPL-MCNC: 3.4 MG/DL — SIGNIFICANT CHANGE UP (ref 2.5–4.5)
PLATELET # BLD AUTO: 252 K/UL — SIGNIFICANT CHANGE UP (ref 150–400)
POTASSIUM SERPL-MCNC: 3.6 MMOL/L — SIGNIFICANT CHANGE UP (ref 3.5–5.3)
POTASSIUM SERPL-SCNC: 3.6 MMOL/L — SIGNIFICANT CHANGE UP (ref 3.5–5.3)
PROT SERPL-MCNC: 7.5 G/DL — SIGNIFICANT CHANGE UP (ref 6–8.3)
RBC # BLD: 3.99 M/UL — LOW (ref 4.2–5.8)
RBC # FLD: 12.8 % — SIGNIFICANT CHANGE UP (ref 10.3–14.5)
RETICS #: 87.7 K/UL — SIGNIFICANT CHANGE UP (ref 25–125)
RETICS/RBC NFR: 2.2 % — SIGNIFICANT CHANGE UP (ref 0.5–2.5)
SARS-COV-2 IGG SERPL QL IA: POSITIVE
SARS-COV-2 IGM SERPL IA-ACNC: 2.45 INDEX — HIGH
SODIUM SERPL-SCNC: 138 MMOL/L — SIGNIFICANT CHANGE UP (ref 135–145)
TROPONIN I SERPL-MCNC: <0.015 NG/ML — SIGNIFICANT CHANGE UP (ref 0–0.04)
TSH SERPL-MCNC: 0.57 UU/ML — SIGNIFICANT CHANGE UP (ref 0.34–4.82)
WBC # BLD: 6.5 K/UL — SIGNIFICANT CHANGE UP (ref 3.8–10.5)
WBC # FLD AUTO: 6.5 K/UL — SIGNIFICANT CHANGE UP (ref 3.8–10.5)

## 2021-01-25 PROCEDURE — 93005 ELECTROCARDIOGRAM TRACING: CPT

## 2021-01-25 PROCEDURE — 96375 TX/PRO/DX INJ NEW DRUG ADDON: CPT

## 2021-01-25 PROCEDURE — 85025 COMPLETE CBC W/AUTO DIFF WBC: CPT

## 2021-01-25 PROCEDURE — 86803 HEPATITIS C AB TEST: CPT

## 2021-01-25 PROCEDURE — 99284 EMERGENCY DEPT VISIT MOD MDM: CPT

## 2021-01-25 PROCEDURE — 71045 X-RAY EXAM CHEST 1 VIEW: CPT

## 2021-01-25 PROCEDURE — 85379 FIBRIN DEGRADATION QUANT: CPT

## 2021-01-25 PROCEDURE — 99284 EMERGENCY DEPT VISIT MOD MDM: CPT | Mod: 25

## 2021-01-25 PROCEDURE — U0005: CPT

## 2021-01-25 PROCEDURE — 84100 ASSAY OF PHOSPHORUS: CPT

## 2021-01-25 PROCEDURE — 83690 ASSAY OF LIPASE: CPT

## 2021-01-25 PROCEDURE — 81003 URINALYSIS AUTO W/O SCOPE: CPT

## 2021-01-25 PROCEDURE — 70450 CT HEAD/BRAIN W/O DYE: CPT

## 2021-01-25 PROCEDURE — 36415 COLL VENOUS BLD VENIPUNCTURE: CPT

## 2021-01-25 PROCEDURE — 83735 ASSAY OF MAGNESIUM: CPT

## 2021-01-25 PROCEDURE — 74176 CT ABD & PELVIS W/O CONTRAST: CPT

## 2021-01-25 PROCEDURE — 87635 SARS-COV-2 COVID-19 AMP PRB: CPT

## 2021-01-25 PROCEDURE — 70450 CT HEAD/BRAIN W/O DYE: CPT | Mod: 26

## 2021-01-25 PROCEDURE — 96374 THER/PROPH/DIAG INJ IV PUSH: CPT

## 2021-01-25 PROCEDURE — 85045 AUTOMATED RETICULOCYTE COUNT: CPT

## 2021-01-25 PROCEDURE — 99223 1ST HOSP IP/OBS HIGH 75: CPT

## 2021-01-25 PROCEDURE — 99222 1ST HOSP IP/OBS MODERATE 55: CPT

## 2021-01-25 PROCEDURE — 99285 EMERGENCY DEPT VISIT HI MDM: CPT | Mod: 25

## 2021-01-25 PROCEDURE — 80053 COMPREHEN METABOLIC PANEL: CPT

## 2021-01-25 PROCEDURE — 84484 ASSAY OF TROPONIN QUANT: CPT

## 2021-01-25 PROCEDURE — 86769 SARS-COV-2 COVID-19 ANTIBODY: CPT

## 2021-01-25 PROCEDURE — 87086 URINE CULTURE/COLONY COUNT: CPT

## 2021-01-25 PROCEDURE — 84443 ASSAY THYROID STIM HORMONE: CPT

## 2021-01-25 PROCEDURE — 99281 EMR DPT VST MAYX REQ PHY/QHP: CPT

## 2021-01-25 RX ORDER — ATORVASTATIN CALCIUM 80 MG/1
40 TABLET, FILM COATED ORAL AT BEDTIME
Refills: 0 | Status: DISCONTINUED | OUTPATIENT
Start: 2021-01-25 | End: 2021-01-25

## 2021-01-25 RX ORDER — ASPIRIN/CALCIUM CARB/MAGNESIUM 324 MG
81 TABLET ORAL DAILY
Refills: 0 | Status: DISCONTINUED | OUTPATIENT
Start: 2021-01-25 | End: 2021-01-25

## 2021-01-25 RX ORDER — SERTRALINE 25 MG/1
150 TABLET, FILM COATED ORAL DAILY
Refills: 0 | Status: DISCONTINUED | OUTPATIENT
Start: 2021-01-25 | End: 2021-01-25

## 2021-01-25 RX ORDER — PANTOPRAZOLE SODIUM 20 MG/1
1 TABLET, DELAYED RELEASE ORAL
Qty: 30 | Refills: 0
Start: 2021-01-25 | End: 2021-02-23

## 2021-01-25 RX ORDER — KETOROLAC TROMETHAMINE 30 MG/ML
15 SYRINGE (ML) INJECTION ONCE
Refills: 0 | Status: DISCONTINUED | OUTPATIENT
Start: 2021-01-25 | End: 2021-01-25

## 2021-01-25 RX ORDER — HYDROCHLOROTHIAZIDE 25 MG
25 TABLET ORAL DAILY
Refills: 0 | Status: DISCONTINUED | OUTPATIENT
Start: 2021-01-25 | End: 2021-01-25

## 2021-01-25 RX ORDER — PANTOPRAZOLE SODIUM 20 MG/1
40 TABLET, DELAYED RELEASE ORAL
Refills: 0 | Status: DISCONTINUED | OUTPATIENT
Start: 2021-01-25 | End: 2021-01-25

## 2021-01-25 RX ORDER — ASPIRIN/CALCIUM CARB/MAGNESIUM 324 MG
325 TABLET ORAL DAILY
Refills: 0 | Status: DISCONTINUED | OUTPATIENT
Start: 2021-01-25 | End: 2021-01-25

## 2021-01-25 RX ORDER — MULTIVIT WITH MIN/MFOLATE/K2 340-15/3 G
1 POWDER (GRAM) ORAL ONCE
Refills: 0 | Status: COMPLETED | OUTPATIENT
Start: 2021-01-25 | End: 2021-01-25

## 2021-01-25 RX ORDER — ENOXAPARIN SODIUM 100 MG/ML
40 INJECTION SUBCUTANEOUS DAILY
Refills: 0 | Status: DISCONTINUED | OUTPATIENT
Start: 2021-01-25 | End: 2021-01-25

## 2021-01-25 RX ORDER — METOCLOPRAMIDE HCL 10 MG
10 TABLET ORAL ONCE
Refills: 0 | Status: COMPLETED | OUTPATIENT
Start: 2021-01-25 | End: 2021-01-25

## 2021-01-25 RX ORDER — GEMFIBROZIL 600 MG
600 TABLET ORAL
Refills: 0 | Status: DISCONTINUED | OUTPATIENT
Start: 2021-01-25 | End: 2021-01-25

## 2021-01-25 RX ORDER — LISINOPRIL 2.5 MG/1
1 TABLET ORAL
Qty: 0 | Refills: 0 | DISCHARGE
Start: 2021-01-25

## 2021-01-25 RX ORDER — ACETAMINOPHEN 500 MG
975 TABLET ORAL ONCE
Refills: 0 | Status: COMPLETED | OUTPATIENT
Start: 2021-01-25 | End: 2021-01-25

## 2021-01-25 RX ORDER — CLOPIDOGREL BISULFATE 75 MG/1
75 TABLET, FILM COATED ORAL DAILY
Refills: 0 | Status: DISCONTINUED | OUTPATIENT
Start: 2021-01-25 | End: 2021-01-25

## 2021-01-25 RX ORDER — SIMETHICONE 80 MG/1
80 TABLET, CHEWABLE ORAL DAILY
Refills: 0 | Status: DISCONTINUED | OUTPATIENT
Start: 2021-01-25 | End: 2021-01-25

## 2021-01-25 RX ORDER — LABETALOL HCL 100 MG
100 TABLET ORAL
Refills: 0 | Status: DISCONTINUED | OUTPATIENT
Start: 2021-01-25 | End: 2021-01-25

## 2021-01-25 RX ORDER — LISINOPRIL 2.5 MG/1
20 TABLET ORAL DAILY
Refills: 0 | Status: DISCONTINUED | OUTPATIENT
Start: 2021-01-25 | End: 2021-01-25

## 2021-01-25 RX ADMIN — LISINOPRIL 20 MILLIGRAM(S): 2.5 TABLET ORAL at 05:13

## 2021-01-25 RX ADMIN — Medication 81 MILLIGRAM(S): at 10:48

## 2021-01-25 RX ADMIN — Medication 975 MILLIGRAM(S): at 19:04

## 2021-01-25 RX ADMIN — ENOXAPARIN SODIUM 40 MILLIGRAM(S): 100 INJECTION SUBCUTANEOUS at 10:48

## 2021-01-25 RX ADMIN — CLOPIDOGREL BISULFATE 75 MILLIGRAM(S): 75 TABLET, FILM COATED ORAL at 10:48

## 2021-01-25 RX ADMIN — Medication 104 MILLIGRAM(S): at 19:40

## 2021-01-25 RX ADMIN — Medication 1 ENEMA: at 20:01

## 2021-01-25 RX ADMIN — SIMETHICONE 80 MILLIGRAM(S): 80 TABLET, CHEWABLE ORAL at 10:48

## 2021-01-25 RX ADMIN — PANTOPRAZOLE SODIUM 40 MILLIGRAM(S): 20 TABLET, DELAYED RELEASE ORAL at 05:14

## 2021-01-25 RX ADMIN — Medication 15 MILLIGRAM(S): at 19:49

## 2021-01-25 RX ADMIN — Medication 25 MILLIGRAM(S): at 05:13

## 2021-01-25 RX ADMIN — Medication 100 MILLIGRAM(S): at 05:14

## 2021-01-25 RX ADMIN — Medication 10 MILLIGRAM(S): at 19:40

## 2021-01-25 RX ADMIN — Medication 600 MILLIGRAM(S): at 05:13

## 2021-01-25 RX ADMIN — Medication 1 BOTTLE: at 19:04

## 2021-01-25 RX ADMIN — Medication 1 BOTTLE: at 20:01

## 2021-01-25 RX ADMIN — SERTRALINE 150 MILLIGRAM(S): 25 TABLET, FILM COATED ORAL at 10:48

## 2021-01-25 NOTE — ED PROVIDER NOTE - NSFOLLOWUPINSTRUCTIONS_ED_ALL_ED_FT
IMPORTANT INSTRUCTIONS FROM Dr. MURILLO:    Please follow up with your personal medical doctor in 24-48 hours. See GI doctor / cardiologist within 1 wk.  Bring results from today to your visit.    If you were advised to take any medications - be sure to review the package insert.    If your symptoms change, get worse or if you have any new symptoms, come to the ER right away.  If you have any questions, call the ER at the phone number on this page.

## 2021-01-25 NOTE — H&P ADULT - PROBLEM SELECTOR PLAN 1
p/w substernal chest pain  Risk factors: HTN, CAD with recent stent in March 2020, HLD  HEART Score= 4 (Moderately suspicious, Age 45-64, >=3 risk factors or history of atherosclerotic disease), Moderate Score, Risk of MACE of 12-16.6%.  TRINO Score= 3 points (>=3 CAD risk factors, Known CAD, Severe Angina >=2 episodes in 24 hours), 13% risk at 14 days of: all-cause mortality, new or recurrent MI, or severe recurrent ischemia requiring urgent revascularization.  CT Abdomen/Pelvis without contrast identified coronary artery calcification.  TTE in 2019 with EF 65-70%, Borderline pulmonary hypertension (PASP ~39 mmHg)  Trop X 2 negative  EKG: NSR  c/w aspirin, statin and labetalol  f/u Trop in AM  f/u Echo

## 2021-01-25 NOTE — H&P ADULT - ATTENDING COMMENTS
Patient is a HTN, HLD, CAD x 1 stent 3/2020, GERD, Anxiety, Depression, h/o Anal fistula repair, s/p cholecystectomy and Left Hip Arthoplasty who presented with a chief complaint of chest pain.  Patient states that on the day of presentation, as he was eating a salad, he experienced a sudden-onset substernal chest pain, unlike any he has previously experienced, without radiation for which he decided to come to [facility] ED for further evaluation.    Of note, patient endorses that he followed-up with his outpatient Cardiologist (Dr. Suarez) 14 days prior to current presentation and was reportedly told "everything is fine".  However, patient was given a referral for what he thinks may be either a repeat cardiac stress testing or ECHO, though not certain.    At time of examination in the ED, patient endorses a "pinching" sensation in the Left upper aspect of his chest as well as "gas".  However, patient denies any headache, dizziness, palpitations, shortness of breath, nausea/vomiting/diarrhea.    T(C): 36.2 (01-24-21 @ 23:25), Max: 36.6 (01-24-21 @ 18:12)  T(F): 97.2 (01-24-21 @ 23:25), Max: 97.8 (01-24-21 @ 18:12)  HR: 66 (01-24-21 @ 23:25) (63 - 66)  BP: 174/83 (01-24-21 @ 23:25) (162/77 - 174/83)  RR: 17 (01-24-21 @ 23:25) (17 - 17)  SpO2: 100% (01-24-21 @ 23:25) (97% - 100%)  Wt(kg): --    P/E: As above MAR    A/P:    Atypical Chest Pain r/o ACS:  -HEART Score= 4 (Moderately suspicious, Age 45-64, >=3 risk factors or history of atherosclerotic disease), Moderate Score, Risk of MACE of 12-16.6%.  -TRINO Score= 3 points (>=3 CAD risk factors, Known CAD, Severe Angina >=2 episodes in 24 hours), 13% risk at 14 days of: all-cause mortality, new or recurrent MI, or severe recurrent ischemia requiring urgent revascularization.  -CT Abdomen/Pelvis without contrast identified coronary artery calcification.  -TTE (3/6/2019) identified Normal left ventricular internal dimensions and wall thicknesses, Normal Left Ventricular Systolic Function. LVEF 65-70%, Normal right ventricular size and function, Borderline pulmonary hypertension (PASP ~39 mmHg)  -D-dimer= 162  -Troponin= <0.015 x 2  -Will admit to Telemetry for continuous cardiac monitoring  -Given patient's cardiac history along with extensive risk factors, will obtain a 3rd Troponin along with simultaneous acquisition of EKG  -Will resume Aspirin, Stain, Plavix  -PPI  -Simethicone  -If patient continues to complain of anginal symptoms in the AM, will ask Cardiology to evaluate patient for further recommendations.    Chronic Anemia:  -Hgb= 11.5 (Baseline= 12.6 on 1/24/2021)  -Will send Retic Count and Iron Panel (Iron, TIBC, Ferritin)    Left Perinephric Stranding:  -CT Abdomen/Pelvis without contrast identified nonspecific mild bilateral perinephric stranding without hydroureteronephrosis. No obvious radiopaque urinary tract stone.  -Patient should follow-up with Urology as an outpatient after discharge    Left Adrenal Thickening:  -CT Abdomen/Pelvis without contrast identified stable mild left adrenal thickening.  -Patient should follow-up with Urology as an outpatient after discharge    Uncontrolled Blood Glucose:  -Hemoglobin A1c with AM labs  -Blood Glucose Monitoring ACHS  -Regular Insulin Sliding Scale ACHS  -Carb Controlled, Heart Healthy, Low Sodium diet as tolerated    HTN:  -Will resume patient's home medication  -Vital Signs Q4H    HLD:  -Lipid Panel with AM labs  -Will resume patient's home medication    GI/DVT PPx:  -Lovenox  -PPI Patient is a HTN, HLD, CAD x 1 stent 3/2020, GERD, Anxiety, Depression, h/o Anal fistula repair, s/p cholecystectomy and Left Hip Arthoplasty who presented with a chief complaint of chest pain.  Patient states that on the day of presentation, as he was eating a salad, he experienced a sudden-onset substernal chest pain, unlike any he has previously experienced, without radiation for which he decided to come to [facility] ED for further evaluation.    Of note, patient endorses that he followed-up with his outpatient Cardiologist (Dr. Suarez) 14 days prior to current presentation and was reportedly told "everything is fine".  However, patient was given a referral for what he thinks may be either a repeat cardiac stress testing or ECHO, though not certain.    At time of examination in the ED, patient endorses a "pinching" sensation in the Left upper aspect of his chest as well as "gas".  However, patient denies any headache, dizziness, palpitations, shortness of breath, nausea/vomiting/diarrhea.    T(C): 36.2 (01-24-21 @ 23:25), Max: 36.6 (01-24-21 @ 18:12)  T(F): 97.2 (01-24-21 @ 23:25), Max: 97.8 (01-24-21 @ 18:12)  HR: 66 (01-24-21 @ 23:25) (63 - 66)  BP: 174/83 (01-24-21 @ 23:25) (162/77 - 174/83)  RR: 17 (01-24-21 @ 23:25) (17 - 17)  SpO2: 100% (01-24-21 @ 23:25) (97% - 100%)  Wt(kg): --    P/E: As above MAR    A/P:    Atypical Chest Pain r/o ACS:  -HEART Score= 4 (Moderately suspicious, Age 45-64, >=3 risk factors or history of atherosclerotic disease), Moderate Score, Risk of MACE of 12-16.6%.  -TRINO Score= 3 points (>=3 CAD risk factors, Known CAD, Severe Angina >=2 episodes in 24 hours), 13% risk at 14 days of: all-cause mortality, new or recurrent MI, or severe recurrent ischemia requiring urgent revascularization.  -CT Abdomen/Pelvis without contrast identified coronary artery calcification.  -TTE (3/6/2019) identified Normal left ventricular internal dimensions and wall thicknesses, Normal Left Ventricular Systolic Function. LVEF 65-70%, Normal right ventricular size and function, Borderline pulmonary hypertension (PASP ~39 mmHg)  -D-dimer= 162  -Lipase= 162  -Troponin= <0.015 x 2  -Will admit to Telemetry for continuous cardiac monitoring  -Given patient's cardiac history along with extensive risk factors, will obtain a 3rd Troponin along with simultaneous acquisition of EKG  -Will resume Aspirin, Stain, Plavix  -PPI  -Simethicone  -If patient continues to complain of anginal symptoms in the AM, will ask Cardiology to evaluate patient for further recommendations.    Chronic Anemia:  -Hgb= 11.5 (Baseline= 12.6 on 1/24/2021)  -Will send Retic Count and Iron Panel (Iron, TIBC, Ferritin)    Left Perinephric Stranding:  -CT Abdomen/Pelvis without contrast identified nonspecific mild bilateral perinephric stranding without hydroureteronephrosis. No obvious radiopaque urinary tract stone.  -Patient should follow-up with Urology as an outpatient after discharge    Left Adrenal Thickening:  -CT Abdomen/Pelvis without contrast identified stable mild left adrenal thickening.  -Patient should follow-up with Urology as an outpatient after discharge    Uncontrolled Blood Glucose:  -Hemoglobin A1c with AM labs  -Blood Glucose Monitoring ACHS  -Regular Insulin Sliding Scale ACHS  -Carb Controlled, Heart Healthy, Low Sodium diet as tolerated    HTN:  -Will resume patient's home medication  -Vital Signs Q4H    HLD:  -Lipid Panel with AM labs  -Will resume patient's home medication    GI/DVT PPx:  -Lovenox  -PPI Patient is a HTN, HLD, CAD x 1 stent 3/2020, GERD, Anxiety, Depression, h/o Anal fistula repair, s/p cholecystectomy and Left Hip Arthoplasty who presented with a chief complaint of chest pain.  Patient states that on the day of presentation, as he was eating a salad, he experienced a sudden-onset substernal chest pain, unlike any he has previously experienced, without radiation for which he decided to come to [facility] ED for further evaluation.    Of note, patient endorses that he followed-up with his outpatient Cardiologist (Dr. Suarez) 14 days prior to current presentation and was reportedly told "everything is fine".  However, patient was given a referral for what he thinks may be either a repeat cardiac stress testing or ECHO, though not certain.    At time of examination in the ED, patient endorses a "pinching" sensation in the Left upper aspect of his chest as well as "gas".  However, patient denies any headache, dizziness, palpitations, shortness of breath, nausea/vomiting/diarrhea.    T(C): 36.2 (01-24-21 @ 23:25), Max: 36.6 (01-24-21 @ 18:12)  T(F): 97.2 (01-24-21 @ 23:25), Max: 97.8 (01-24-21 @ 18:12)  HR: 66 (01-24-21 @ 23:25) (63 - 66)  BP: 174/83 (01-24-21 @ 23:25) (162/77 - 174/83)  RR: 17 (01-24-21 @ 23:25) (17 - 17)  SpO2: 100% (01-24-21 @ 23:25) (97% - 100%)  Wt(kg): --    P/E: As above MAR    A/P:    Atypical Chest Pain r/o ACS:  -HEART Score= 4 (Moderately suspicious, Age 45-64, >=3 risk factors or history of atherosclerotic disease), Moderate Score, Risk of MACE of 12-16.6%.  -TRINO Score= 3 points (>=3 CAD risk factors, Known CAD, Severe Angina >=2 episodes in 24 hours), 13% risk at 14 days of: all-cause mortality, new or recurrent MI, or severe recurrent ischemia requiring urgent revascularization.  -CT Abdomen/Pelvis without contrast identified coronary artery calcification.  -TTE (3/6/2019) identified Normal left ventricular internal dimensions and wall thicknesses, Normal Left Ventricular Systolic Function. LVEF 65-70%, Normal right ventricular size and function, Borderline pulmonary hypertension (PASP ~39 mmHg)  -D-dimer= 162  -Lipase= 162  -Troponin= <0.015 x 2  -Will admit to Telemetry for continuous cardiac monitoring  -Given patient's cardiac history along with extensive risk factors, will obtain a 3rd Troponin along with simultaneous acquisition of EKG  -Will resume Aspirin, Stain, Plavix  -PPI  -Simethicone  -If patient continues to complain of anginal symptoms in the AM, will ask Cardiology to evaluate patient for further recommendations.    GERD:  -PPI    Chronic Anemia:  -Hgb= 11.5 (Baseline= 12.6 on 1/24/2021)  -Will send Retic Count and Iron Panel (Iron, TIBC, Ferritin)    Left Perinephric Stranding:  -CT Abdomen/Pelvis without contrast identified nonspecific mild bilateral perinephric stranding without hydroureteronephrosis. No obvious radiopaque urinary tract stone.  -Patient should follow-up with Urology as an outpatient after discharge    Left Adrenal Thickening:  -CT Abdomen/Pelvis without contrast identified stable mild left adrenal thickening.  -Patient should follow-up with Urology as an outpatient after discharge    Uncontrolled Blood Glucose:  -Hemoglobin A1c with AM labs  -Blood Glucose Monitoring ACHS  -Regular Insulin Sliding Scale ACHS  -Carb Controlled, Heart Healthy, Low Sodium diet as tolerated    HTN:  -Will resume patient's home medication  -Vital Signs Q4H    HLD:  -Lipid Panel with AM labs  -Will resume patient's home medication    GI/DVT PPx:  -Lovenox  -PPI

## 2021-01-25 NOTE — DISCHARGE NOTE NURSING/CASE MANAGEMENT/SOCIAL WORK - PATIENT PORTAL LINK FT
You can access the FollowMyHealth Patient Portal offered by [facility] by registering at the following website: [email or web address]. By joining [facility]’s FollowMyHealth portal, you will also be able to view your health information using other applications (apps) compatible with our system.

## 2021-01-25 NOTE — DISCHARGE NOTE PROVIDER - NSDCMRMEDTOKEN_GEN_ALL_CORE_FT
Aspir 81 oral delayed release tablet: 1 tab(s) orally once a day  atorvastatin 40 mg oral tablet: 1 tab(s) orally once a day (at bedtime)   gemfibrozil 600 mg oral tablet: 1 tab(s) orally 2 times a day  labetalol 100 mg oral tablet: 1 tab(s) orally 2 times a day  lisinopril-hydroCHLOROthiazide 20 mg-25 mg oral tablet: 1 tab(s) orally once a day  LORazepam 1 mg oral tablet: 1 tab(s) orally once a day (at bedtime), As Needed  Omega-3 oral capsule: 1 cap(s) orally once a day  Plavix 75 mg oral tablet: 1 tab(s) orally once a day   sertraline 100 mg oral tablet: 1.5 tab(s) orally once a day  testosterone 30 mg/actuation transdermal solution: 1 pump(s) transdermal once a day to each axilla   Aspir 81 oral delayed release tablet: 1 tab(s) orally once a day  atorvastatin 40 mg oral tablet: 1 tab(s) orally once a day (at bedtime)   gemfibrozil 600 mg oral tablet: 1 tab(s) orally 2 times a day  labetalol 100 mg oral tablet: 1 tab(s) orally 2 times a day  lisinopril-hydroCHLOROthiazide 20 mg-25 mg oral tablet: 1 tab(s) orally once a day  LORazepam 1 mg oral tablet: 1 tab(s) orally once a day (at bedtime), As Needed  Omega-3 oral capsule: 1 cap(s) orally once a day  pantoprazole 40 mg oral delayed release tablet: 1 tab(s) orally once a day (before a meal)  Plavix 75 mg oral tablet: 1 tab(s) orally once a day   sertraline 100 mg oral tablet: 1.5 tab(s) orally once a day  testosterone 30 mg/actuation transdermal solution: 1 pump(s) transdermal once a day to each axilla  Zestril 20 mg oral tablet: 1 tab(s) orally once a day

## 2021-01-25 NOTE — ED PROVIDER NOTE - CLINICAL SUMMARY MEDICAL DECISION MAKING FREE TEXT BOX
Clinical history concerning for a SAH. However, patient is within the 6 hr window for a CT scan. Will do a Non-con CT, will treat the patient for his pain. Meningitis is unlikely.

## 2021-01-25 NOTE — DISCHARGE NOTE PROVIDER - CARE PROVIDER_API CALL
Cassy Li  [facility] [address], [place] [address]  Phone: [phone]  Fax: (   )    -  Follow Up Time:

## 2021-01-25 NOTE — H&P ADULT - HISTORY OF PRESENT ILLNESS
65 M with PMH of HTN, HLD, CAD s/p 1 stent in March 2020, GERD, Anxiety, Depression, h/o Anal fistula repair, s/p cholecystectomy and Left Hip Arthoplasty who presented with complaint of chest pain.  Patient states as he was eating a salad, he experienced a sudden-onset substernal and epigastric pressure like chest pain, not radiating, no aggravating or relieving factors. Pt states he feels he has gaseous abdomen and that might be contributing to his chest pain. Pt states his last BM was on the day of presentation. Pt denies any fever, palpitations, shortness of breath, N/V/D, abdominal pain, urinary symptoms or any other acute complaints .

## 2021-01-25 NOTE — DISCHARGE NOTE PROVIDER - NSDCCPCAREPLAN_GEN_ALL_CORE_FT
PRINCIPAL DISCHARGE DIAGNOSIS  Diagnosis: Acute coronary syndrome  Assessment and Plan of Treatment: You came in with complaint of chest pain. Your were seen and evaluated by a cardiologist during your hospital stay. Your cardiac work up was unremarkable. Your chest pain is ess likely due to cardiac etiology in light of revascularization less than a year ago, as well as negative cardiac enzymes and unremarkable EKG. Please follow up with your cardiologist to decide on further cardiac testing. Continue with your aspirin, clopidogrel, atorvastatin, abetalol and lisinopril-HCTZ. Please return to hospital if chest pain returns or you become short of breath.      SECONDARY DISCHARGE DIAGNOSES  Diagnosis: Hypertension  Assessment and Plan of Treatment: Continue with all blood pressure medications as prescribed and low sodium diet.    Diagnosis: HLD (hyperlipidemia)  Assessment and Plan of Treatment: Continue with all medications as prescribed for your cholesterol along with a low fat diet.

## 2021-01-25 NOTE — ED PROVIDER NOTE - OBJECTIVE STATEMENT
66 y/o M patient with a significant PMHx of HLD, HTN, GERD, umbical hernia presents to the ED admitted here yesterday and d/c for a chest discomfort, now here for severe headache and high blood pressure 30 mins prior to arrival. Patient denies weakness in the arms or legs, nausea, vomiting, diarrhea, fever and chills, shortness of breath, cough, or any other complains. Patient states that this symptoms has never happened before. Patient reports that he is feeling better from his chest discomfort.
complains of pain/discomfort

## 2021-01-25 NOTE — ED PROVIDER NOTE - CARE PLAN
Principal Discharge DX:	Acute nonintractable headache, unspecified headache type  Secondary Diagnosis:	Constipation, unspecified constipation type  Secondary Diagnosis:	Hypertension, unspecified type

## 2021-01-25 NOTE — ED ADULT NURSE NOTE - OBJECTIVE STATEMENT
pt is here for headache.  pt stated that dizziness, denied headache or chest pain, denied fever or sob, no distress noted at this time.

## 2021-01-25 NOTE — DISCHARGE NOTE PROVIDER - HOSPITAL COURSE
64 y/o M with pmhx significant for HTN, HLD, CAD x 1 stent 3/2020, GERD, Anxiety, Depression, h/o Anal fistula repair, s/p cholecystectomy and Left Hip Arthoplasty who presented with a chief complaint of chest pain.  Patient states that on the day of presentation, as he was eating a salad, he experienced a sudden-onset substernal chest pain, unlike any he has previously experienced, without radiation for which he decided to come to [facility] ED for further evaluation. Patient endorsed that he followed-up with his outpatient Cardiologist (Dr. Suarez) 14 days prior to current presentation and was reportedly told "everything is fine".  However, patient was given a referral for what he thinks may be either a repeat cardiac stress testing or ECHO, though not certain. patient c/o "pinching" sensation in the Left upper aspect of his chest as well as "gas".  However, patient denied any headache, dizziness, palpitations, shortness of breath, nausea/vomiting/diarrhea. Seen and evaluated at bedside by Cardiology. Cardiology work up unremarkable. Safe for discharge per Cardiology standpoint and per attending of record.

## 2021-01-25 NOTE — CHART NOTE - NSCHARTNOTEFT_GEN_A_CORE
MEDICAL ATTENDING NOTE  Patient was examined in ED holding and discussed with NP and with Cardiology.     Patient is a 65y old  Male who presents with a chief complaint of Chest pain (2021 11:51)      HPI:  65 M with PMH of HTN, HLD, CAD s/p 1 stent in 2020, GERD, Anxiety, Depression, h/o Anal fistula repair, s/p cholecystectomy and Left Hip Arthoplasty who presented with complaint of chest pain.  Patient states as he was eating a salad, he experienced a sudden-onset substernal and epigastric pressure like chest pain, not radiating, no aggravating or relieving factors. Pt states he feels he has gaseous abdomen and that might be contributing to his chest pain. Pt states his last BM was on the day of presentation. Pt denies any fever, palpitations, shortness of breath, N/V/D, abdominal pain, urinary symptoms or any other acute complaints . (2021 00:25)      INTERVAL HPI/OVERNIGHT EVENTS: no new complaints    MEDICATIONS  (STANDING):  aspirin enteric coated 81 milliGRAM(s) Oral daily  atorvastatin 40 milliGRAM(s) Oral at bedtime  clopidogrel Tablet 75 milliGRAM(s) Oral daily  enoxaparin Injectable 40 milliGRAM(s) SubCutaneous daily  gemfibrozil 600 milliGRAM(s) Oral two times a day  hydrochlorothiazide 25 milliGRAM(s) Oral daily  labetalol 100 milliGRAM(s) Oral two times a day  lisinopril 20 milliGRAM(s) Oral daily  pantoprazole    Tablet 40 milliGRAM(s) Oral before breakfast  sertraline 150 milliGRAM(s) Oral daily  simethicone 80 milliGRAM(s) Chew daily    MEDICATIONS  (PRN):  LORazepam     Tablet 1 milliGRAM(s) Oral at bedtime PRN Anxiety      __________________________________________________  REVIEW OF SYSTEMS:    CONSTITUTIONAL: No fever,   EYES: no acute visual disturbances  NECK: No pain or stiffness  RESPIRATORY: No cough; No shortness of breath  CARDIOVASCULAR: No chest pain, no palpitations  GASTROINTESTINAL: No pain. No nausea or vomiting; No diarrhea   NEUROLOGICAL: No headache or numbness, no tremors  MUSCULOSKELETAL: No joint pain, no muscle pain  GENITOURINARY: no dysuria, no frequency, no hesitancy  PSYCHIATRY: no depression , no anxiety  ALL OTHER  ROS negative        Vital Signs Last 24 Hrs  T(C): 36.4 (2021 11:15), Max: 36.8 (2021 02:20)  T(F): 97.5 (2021 11:15), Max: 98.2 (2021 02:20)  HR: 67 (2021 11:15) (61 - 73)  BP: 151/76 (2021 11:15) (143/64 - 174/83)  BP(mean): --  RR: 18 (2021 11:15) (17 - 19)  SpO2: 97% (2021 11:15) (97% - 100%)    ________________________________________________  PHYSICAL EXAM:  GENERAL: NAD  HEENT: Normocephalic;  conjunctivae and sclerae clear; moist mucous membranes;   NECK : supple  CHEST/LUNG: Clear to auscultation bilaterally with good air entry   HEART: S1 S2  regular; no murmurs, gallops or rubs  ABDOMEN: Soft, Nontender, Nondistended; Bowel sounds present  EXTREMITIES: no cyanosis; no edema; no calf tenderness  SKIN: warm and dry; no rash  NERVOUS SYSTEM:  Awake and alert; Oriented  to place, person and time ; no new deficits    _________________________________________________  LABS:                        11.7   6.50  )-----------( 252      ( 2021 06:18 )             35.7     01-    138  |  102  |  19<H>  ----------------------------<  105<H>  3.6   |  29  |  1.02    Ca    8.9      2021 06:18  Phos  3.4     01-  Mg     2.4     01-25    TPro  7.5  /  Alb  3.8  /  TBili  0.4  /  DBili  x   /  AST  20  /  ALT  26  /  AlkPhos  84        Urinalysis Basic - ( 2021 20:52 )    Color: Yellow / Appearance: Clear / S.010 / pH: x  Gluc: x / Ketone: Negative  / Bili: Negative / Urobili: Negative   Blood: x / Protein: Negative / Nitrite: Negative   Leuk Esterase: Negative / RBC: x / WBC x   Sq Epi: x / Non Sq Epi: x / Bacteria: x    Patient was admitted with c/o chest pain because of history of ASCVD, s/p stent.  Chest pain has resolved.  No evidence of ACS.   Cardiology assessment is that this symptom is unlikely cardiac origin.     Plan:  Discharge home.  f/u PMD and Cardiology.

## 2021-01-25 NOTE — ED PROVIDER NOTE - CARE PROVIDER_API CALL
Ted Gaviria)  Medicine  [address] [place] [address], [address] [place] [address]  Phone: [phone]  Fax: [phone]  Follow Up Time: 4-6 Days

## 2021-01-25 NOTE — H&P ADULT - ASSESSMENT
65 M with PMH of HTN, HLD, CAD s/p 1 stent in March 2020, GERD, Anxiety, Depression, h/o Anal fistula repair, s/p cholecystectomy and Left Hip Arthoplasty who presented with complaint of chest pain.  Patient states as he was eating a salad, he experienced a sudden-onset substernal and epigastric pressure like chest pain, not radiating, no aggravating or relieving factors. Pt states he feels he has gaseous abdomen and that might be contributing to his chest pain. Pt states his last BM was on the day of presentation. Pt denies any fever, palpitations, shortness of breath, N/V/D, abdominal pain, urinary symptoms or any other acute complaints .    EKG: NSR  Trop x2 neg  CT A/P: Nonspecific mild bilateral perinephric stranding without hydroureteronephrosis or obvious radiopaque urinary tract stone.     Pt will be admitted for chest pain

## 2021-01-25 NOTE — H&P ADULT - NSICDXPASTMEDICALHX_GEN_ALL_CORE_FT
PAST MEDICAL HISTORY:  Cholelithiasis     Depression     Gall bladder stones     GERD (gastroesophageal reflux disease)     High cholesterol     HLD (hyperlipidemia)     Hypertension     Low testosterone     Phimosis     Seasonal allergies     Umbilical hernia

## 2021-01-25 NOTE — H&P ADULT - NSICDXPASTSURGICALHX_GEN_ALL_CORE_FT
PAST SURGICAL HISTORY:  Anal fistula h/o repair of anal fistula    History of hip replacement, total, left 9/2016    History of sinus surgery     S/P LASIK surgery of both eyes

## 2021-01-25 NOTE — H&P ADULT - NSICDXFAMILYHX_GEN_ALL_CORE_FT
FAMILY HISTORY:  Father  Still living? No  Family history of hypertension in father, Age at diagnosis: Age Unknown  Family history of myocardial infarction, Age at diagnosis: Age Unknown    Mother  Still living? No  Family history of Alzheimer's disease, Age at diagnosis: Age Unknown  Family history of stomach cancer, Age at diagnosis: Age Unknown    Sibling  Still living? No  Family history of brain aneurysm, Age at diagnosis: Age Unknown  Family history of stomach cancer, Age at diagnosis: Age Unknown

## 2021-01-25 NOTE — CONSULT NOTE ADULT - SUBJECTIVE AND OBJECTIVE BOX
CHIEF COMPLAINT: chest pain    HPI: 66 yo M with HTN, HLD, and CAD s/p PCI (03/2020) who presented with chest pain. Patient reports    PAST MEDICAL & SURGICAL HISTORY:  As above, also Phimosis, GERD (gastroesophageal reflux disease), Seasonal allergies, Low testosterone, Umbilical hernia, Cholelithiasis, Depression, S/P LASIK surgery of both eyes, Anal fistula  History of sinus surgery, History of hip replacement, total, left 9/2016    Allergies    No Known Allergies    MEDICATIONS  (STANDING):  aspirin enteric coated 81 milliGRAM(s) Oral daily  atorvastatin 40 milliGRAM(s) Oral at bedtime  clopidogrel Tablet 75 milliGRAM(s) Oral daily  enoxaparin Injectable 40 milliGRAM(s) SubCutaneous daily  gemfibrozil 600 milliGRAM(s) Oral two times a day  hydrochlorothiazide 25 milliGRAM(s) Oral daily  labetalol 100 milliGRAM(s) Oral two times a day  lisinopril 20 milliGRAM(s) Oral daily  pantoprazole    Tablet 40 milliGRAM(s) Oral before breakfast  sertraline 150 milliGRAM(s) Oral daily  simethicone 80 milliGRAM(s) Chew daily    MEDICATIONS  (PRN):  LORazepam     Tablet 1 milliGRAM(s) Oral at bedtime PRN Anxiety    FAMILY HISTORY:  Family history of Alzheimer&#x27;s disease (Mother)    Family history of stomach cancer (Sibling, Mother)    Family history of hypertension in father (Father)    Family history of myocardial infarction (Father)    Family history of brain aneurysm (Sibling)      No family history of premature coronary artery disease or sudden cardiac death    SOCIAL HISTORY:  Smoking-   Alcohol-  Illicit Drug use-    REVIEW OF SYSTEMS:  Constitutional: [ ] fever, [ ]weight loss,  [ ]fatigue  Eyes: [ ] visual changes  Respiratory: [ ]shortness of breath;  [ ] cough, [ ]wheezing, [ ]chills, [ ]hemoptysis  Cardiovascular: [ ] chest pain, [ ]palpitations, [ ]dizziness,  [ ]leg swelling [ ]syncope  Gastrointestinal: [ ] abdominal pain, [ ]nausea, [ ]vomiting,  [ ]diarrhea   Genitourinary: [ ] dysuria, [ ] hematuria  Neurologic: [ ] headaches [ ] tremors  [ ] weakness [ ] lightheadedness  Skin: [ ] itching, [ ]burning, [ ] rashes  Endocrine: [ ] heat or cold intolerance  Musculoskeletal: [ ] joint pain or swelling; [ ] muscle, back, or extremity pain  Psychiatric: [ ] depression, [ ]anxiety, [ ]mood swings, or [ ]difficulty sleeping  Hematologic: [ ] easy bruising, [ ] bleeding gums       [ x] All others negative	  [ ] Unable to obtain    Vital Signs Last 24 Hrs  T(C): 36.3 (25 Jan 2021 07:25), Max: 36.8 (25 Jan 2021 02:20)  T(F): 97.4 (25 Jan 2021 07:25), Max: 98.2 (25 Jan 2021 02:20)  HR: 61 (25 Jan 2021 07:25) (61 - 73)  BP: 157/89 (25 Jan 2021 07:25) (143/64 - 174/83)  BP(mean): --  RR: 17 (25 Jan 2021 07:25) (17 - 19)  SpO2: 97% (25 Jan 2021 07:25) (97% - 100%)  I&O's Summary      PHYSICAL EXAM:  General: No acute distress  HEENT: EOMI, PERRL  Neck: Supple, No JVD  Lungs: Clear to auscultation bilaterally; No rales or wheezing  Heart: Regular rate and rhythm; No murmurs, rubs, or gallops  Abdomen: Nontender, bowel sounds present  Extremities: No clubbing, cyanosis, or edema  Nervous system:  Alert & Oriented X3, no focal deficits  Psychiatric: Normal affect  Skin: No rashes or lesions      LABS:  01-25    138  |  102  |  19<H>  ----------------------------<  105<H>  3.6   |  29  |  1.02    Ca    8.9      25 Jan 2021 06:18  Phos  3.4     01-25  Mg     2.4     01-25    TPro  7.5  /  Alb  3.8  /  TBili  0.4  /  DBili  x   /  AST  20  /  ALT  26  /  AlkPhos  84  01-25    Creatinine Trend: 1.02<--, 1.07<--                        11.7   6.50  )-----------( 252      ( 25 Jan 2021 06:18 )             35.7     Lipid Panel: Pending         Cardiac Enzymes: CARDIAC MARKERS ( 25 Jan 2021 06:18 )  <0.015 ng/mL / x     / x     / x     / x      CARDIAC MARKERS ( 24 Jan 2021 22:06 )  <0.015 ng/mL / x     / x     / x     / x      CARDIAC MARKERS ( 24 Jan 2021 18:58 )  <0.015 ng/mL / x     / x     / x     / x        RADIOLOGY:    ECG [my interpretation]:    TELEMETRY:    ECHO:   < from: Transthoracic Echocardiogram (03.06.19 @ 09:00) >  CONCLUSIONS:  1. Normal left ventricular internal dimensions and wall  thicknesses.  2. Normal Left Ventricular Systolic Function. LVEF 65-70%.  3. Normal right ventricular size and function.  4. Borderline pulmonary hypertension (PASP ~39 mmHg).    *** Compared with echocardiogram of 12/27/2017, no  significant changes noted.    < end of copied text >    STRESS TEST:    < from: Cardiac Treadmill Stress Test (Non Imaging) (Cardiac Treadmill Stress Test (Non Imaging) .) (01.23.20 @ 08:44) >  STRESS TEST IMPRESSIONS:  Exercise capacity: 8 METS, Fair for age and gender. 78% of  MPHR.  Symptom: No Symptom.  BP Response: Hypotensive response (decrease in blood  pressure with stress).  Heart Rhythm: Normal Sinus Rhythm.  ECG Abnormalities: None.  Arrhythmia: None.  Negative ECG evidence of ischemia during submaximal  exercise stress test, however patient had late hypotensive  response concerning for significant coronary artery  disease.    < end of copied text >      CATHETERIZATION:    < from: Cardiac Cath Lab - Adult (03.06.20 @ 09:21) >  CORONARY VESSELS: The coronary circulation is right dominant.  LM:   --  LM: Normal.  LAD:   --  LAD: Angiography showed minor luminal irregularities with no  flow limiting lesions.  --  Proximal LAD: There was a tubular 30 % stenosis.  --  Mid LAD: There was a tubular 30 % stenosis in the middle third of the  vessel segment.  --  Distal LAD: The vessel was small sized. There was a tubular 40 %  stenosis in the distal third of the vessel segment. There was TRINO grade 3  flow through the vessel (brisk flow).  --  D1: There was a tubular 50 % stenosis in the proximal third of the  vessel segment. There was TRINO grade 3 flow through the vessel (brisk  flow) and a small vascular territory distal to the lesion.  CX:   --  Circumflex: Angiography showed minor luminal irregularities with  no flow limiting lesions.  RI:   --  Ramus intermedius: The vessel was large sized.  --  Proximal ramus intermedius: Angiography showed minor luminal  irregularities with no flow limiting lesions.  RCA:   --  Mid RCA: There was a tubular 30 % stenosis in the proximal third  of the vessel segment. In a second lesion, there was a discrete 90 %  stenosis in the middle third of the vessel segment. There was TRINO grade 3  flow through the vessel (brisk flow).  --  Distal RCA: There was a tubular 30 % stenosis in the distal third of  the vessel segment.    < end of copied text >   CHIEF COMPLAINT: chest pain    HPI: 64 yo M with HTN, HLD, and CAD s/p PCI to RCA (03/2020) who presented with chest pain. Patient reports    Cardiologist: Dr. Suarez    PAST MEDICAL & SURGICAL HISTORY:  As above, also Phimosis, GERD (gastroesophageal reflux disease), Seasonal allergies, Low testosterone, Umbilical hernia, Cholelithiasis, Depression, S/P LASIK surgery of both eyes, Anal fistula  History of sinus surgery, History of hip replacement, total, left 9/2016    Allergies    No Known Allergies    MEDICATIONS  (STANDING):  aspirin enteric coated 81 milliGRAM(s) Oral daily  atorvastatin 40 milliGRAM(s) Oral at bedtime  clopidogrel Tablet 75 milliGRAM(s) Oral daily  enoxaparin Injectable 40 milliGRAM(s) SubCutaneous daily  gemfibrozil 600 milliGRAM(s) Oral two times a day  hydrochlorothiazide 25 milliGRAM(s) Oral daily  labetalol 100 milliGRAM(s) Oral two times a day  lisinopril 20 milliGRAM(s) Oral daily  pantoprazole    Tablet 40 milliGRAM(s) Oral before breakfast  sertraline 150 milliGRAM(s) Oral daily  simethicone 80 milliGRAM(s) Chew daily    MEDICATIONS  (PRN):  LORazepam     Tablet 1 milliGRAM(s) Oral at bedtime PRN Anxiety    FAMILY HISTORY:  Family history of Alzheimer&#x27;s disease (Mother)    Family history of stomach cancer (Sibling, Mother)    Family history of hypertension in father (Father)    Family history of myocardial infarction (Father)    Family history of brain aneurysm (Sibling)      No family history of premature coronary artery disease or sudden cardiac death    SOCIAL HISTORY:  Smoking-   Alcohol-  Illicit Drug use-    REVIEW OF SYSTEMS:  Constitutional: [ ] fever, [ ]weight loss,  [ ]fatigue  Eyes: [ ] visual changes  Respiratory: [ ]shortness of breath;  [ ] cough, [ ]wheezing, [ ]chills, [ ]hemoptysis  Cardiovascular: [ ] chest pain, [ ]palpitations, [ ]dizziness,  [ ]leg swelling [ ]syncope  Gastrointestinal: [ ] abdominal pain, [ ]nausea, [ ]vomiting,  [ ]diarrhea   Genitourinary: [ ] dysuria, [ ] hematuria  Neurologic: [ ] headaches [ ] tremors  [ ] weakness [ ] lightheadedness  Skin: [ ] itching, [ ]burning, [ ] rashes  Endocrine: [ ] heat or cold intolerance  Musculoskeletal: [ ] joint pain or swelling; [ ] muscle, back, or extremity pain  Psychiatric: [ ] depression, [ ]anxiety, [ ]mood swings, or [ ]difficulty sleeping  Hematologic: [ ] easy bruising, [ ] bleeding gums       [ x] All others negative	  [ ] Unable to obtain    Vital Signs Last 24 Hrs  T(C): 36.3 (25 Jan 2021 07:25), Max: 36.8 (25 Jan 2021 02:20)  T(F): 97.4 (25 Jan 2021 07:25), Max: 98.2 (25 Jan 2021 02:20)  HR: 61 (25 Jan 2021 07:25) (61 - 73)  BP: 157/89 (25 Jan 2021 07:25) (143/64 - 174/83)  BP(mean): --  RR: 17 (25 Jan 2021 07:25) (17 - 19)  SpO2: 97% (25 Jan 2021 07:25) (97% - 100%)  I&O's Summary      PHYSICAL EXAM:  General: No acute distress  HEENT: EOMI, PERRL  Neck: Supple, No JVD  Lungs: Clear to auscultation bilaterally; No rales or wheezing  Heart: Regular rate and rhythm; No murmurs, rubs, or gallops  Abdomen: Nontender, bowel sounds present  Extremities: No clubbing, cyanosis, or edema  Nervous system:  Alert & Oriented X3, no focal deficits  Psychiatric: Normal affect  Skin: No rashes or lesions      LABS:  01-25    138  |  102  |  19<H>  ----------------------------<  105<H>  3.6   |  29  |  1.02    Ca    8.9      25 Jan 2021 06:18  Phos  3.4     01-25  Mg     2.4     01-25    TPro  7.5  /  Alb  3.8  /  TBili  0.4  /  DBili  x   /  AST  20  /  ALT  26  /  AlkPhos  84  01-25    Creatinine Trend: 1.02<--, 1.07<--                        11.7   6.50  )-----------( 252      ( 25 Jan 2021 06:18 )             35.7     Lipid Panel: Pending     Cardiac Enzymes: CARDIAC MARKERS ( 25 Jan 2021 06:18 )  <0.015 ng/mL / x     / x     / x     / x      CARDIAC MARKERS ( 24 Jan 2021 22:06 )  <0.015 ng/mL / x     / x     / x     / x      CARDIAC MARKERS ( 24 Jan 2021 18:58 )  <0.015 ng/mL / x     / x     / x     / x        RADIOLOGY:   < from: Xray Chest 2 Views PA/Lat (10.28.17 @ 07:29) >  IMPRESSION: Clear lungs.    < end of copied text >    < from: CT Heart with Coronaries (02.26.20 @ 08:49) >  IMPRESSION:    1.  Severe stenosis (>95%) of the mid RCA from noncalcified plaque with positive remodeling.  2.  Severe stenosis of the ostial first diagonal branch from calcified plaque.     < end of copied text >    ECG [my interpretation]: 1/24/21 @ 18:28: Sinus rhythm, normal axis, normal EKG    TELEMETRY:    ECHO:   < from: Transthoracic Echocardiogram (03.06.19 @ 09:00) >  CONCLUSIONS:  1. Normal left ventricular internal dimensions and wall  thicknesses.  2. Normal Left Ventricular Systolic Function. LVEF 65-70%.  3. Normal right ventricular size and function.  4. Borderline pulmonary hypertension (PASP ~39 mmHg).    *** Compared with echocardiogram of 12/27/2017, no  significant changes noted.    < end of copied text >    STRESS TEST:    < from: Cardiac Treadmill Stress Test (Non Imaging) (Cardiac Treadmill Stress Test (Non Imaging) .) (01.23.20 @ 08:44) >  STRESS TEST IMPRESSIONS:  Exercise capacity: 8 METS, Fair for age and gender. 78% of  MPHR.  Symptom: No Symptom.  BP Response: Hypotensive response (decrease in blood  pressure with stress).  Heart Rhythm: Normal Sinus Rhythm.  ECG Abnormalities: None.  Arrhythmia: None.  Negative ECG evidence of ischemia during submaximal  exercise stress test, however patient had late hypotensive  response concerning for significant coronary artery  disease.    < end of copied text >      CATHETERIZATION:    < from: Cardiac Cath Lab - Adult (03.06.20 @ 09:21) >  CORONARY VESSELS: The coronary circulation is right dominant.  LM:   --  LM: Normal.  LAD:   --  LAD: Angiography showed minor luminal irregularities with no  flow limiting lesions.  --  Proximal LAD: There was a tubular 30 % stenosis.  --  Mid LAD: There was a tubular 30 % stenosis in the middle third of the  vessel segment.  --  Distal LAD: The vessel was small sized. There was a tubular 40 %  stenosis in the distal third of the vessel segment. There was TRINO grade 3  flow through the vessel (brisk flow).  --  D1: There was a tubular 50 % stenosis in the proximal third of the  vessel segment. There was TRINO grade 3 flow through the vessel (brisk  flow) and a small vascular territory distal to the lesion.  CX:   --  Circumflex: Angiography showed minor luminal irregularities with  no flow limiting lesions.  RI:   --  Ramus intermedius: The vessel was large sized.  --  Proximal ramus intermedius: Angiography showed minor luminal  irregularities with no flow limiting lesions.  RCA:   --  Mid RCA: There was a tubular 30 % stenosis in the proximal third  of the vessel segment. In a second lesion, there was a discrete 90 %  stenosis in the middle third of the vessel segment. There was TRINO grade 3  flow through the vessel (brisk flow).  --  Distal RCA: There was a tubular 30 % stenosis in the distal third of  the vessel segment.    < end of copied text >   CHIEF COMPLAINT: chest pain    HPI: 64 yo M with HTN, HLD, and CAD s/p PCI to RCA (03/2020) who presented with abdominal pain. Patient reports that symptoms started 2-3 days prior to admission. He reported what he describes as gas pain located in the mid to lower left abdomen, and if he were to push in the area, the sensation radiated towards the chest with some sensation of pressure there. Patient reports that the symptoms are a lot better at this point. There is no associated dyspnea, lightheadedness, palpitations, or syncope. States this is not his anginal equivalent. Patient otherwise reports compliance with all cardiac medications, saw his cardiologist recently. Patient denies LE edema, orthopnea, or PND.    Cardiologist: Dr. Suarez    PAST MEDICAL & SURGICAL HISTORY:  As above, also Phimosis, GERD (gastroesophageal reflux disease), Seasonal allergies, Low testosterone, Umbilical hernia, Cholelithiasis, Depression, S/P LASIK surgery of both eyes, Anal fistula  History of sinus surgery, History of hip replacement, total, left 9/2016    Allergies    No Known Allergies    MEDICATIONS  (STANDING):  aspirin enteric coated 81 milliGRAM(s) Oral daily  atorvastatin 40 milliGRAM(s) Oral at bedtime  clopidogrel Tablet 75 milliGRAM(s) Oral daily  enoxaparin Injectable 40 milliGRAM(s) SubCutaneous daily  gemfibrozil 600 milliGRAM(s) Oral two times a day  hydrochlorothiazide 25 milliGRAM(s) Oral daily  labetalol 100 milliGRAM(s) Oral two times a day  lisinopril 20 milliGRAM(s) Oral daily  pantoprazole    Tablet 40 milliGRAM(s) Oral before breakfast  sertraline 150 milliGRAM(s) Oral daily  simethicone 80 milliGRAM(s) Chew daily    MEDICATIONS  (PRN):  LORazepam     Tablet 1 milliGRAM(s) Oral at bedtime PRN Anxiety    FAMILY HISTORY:  Family history of Alzheimer&#x27;s disease (Mother)    Family history of stomach cancer (Sibling, Mother)    Family history of hypertension in father (Father)    Family history of myocardial infarction (Father)    Family history of brain aneurysm (Sibling)      No family history of premature coronary artery disease or sudden cardiac death    SOCIAL HISTORY:  Smoking- Denies  Alcohol- Rare  Illicit Drug use- Denies    REVIEW OF SYSTEMS:  Constitutional: [ ] fever, [ ]weight loss,  [ ]fatigue  Eyes: [ ] visual changes  Respiratory: [ ]shortness of breath;  [ ] cough, [ ]wheezing, [ ]chills, [ ]hemoptysis  Cardiovascular: [ ] chest pain, [ ]palpitations, [ ]dizziness,  [ ]leg swelling [ ]syncope  Gastrointestinal: [ ] abdominal pain, [ ]nausea, [ ]vomiting,  [ ]diarrhea   Genitourinary: [ ] dysuria, [ ] hematuria  Neurologic: [ ] headaches [ ] tremors  [ ] weakness [ ] lightheadedness  Skin: [ ] itching, [ ]burning, [ ] rashes  Endocrine: [ ] heat or cold intolerance  Musculoskeletal: [ ] joint pain or swelling; [ ] muscle, back, or extremity pain  Psychiatric: [ ] depression, [ ]anxiety, [ ]mood swings, or [ ]difficulty sleeping  Hematologic: [ ] easy bruising, [ ] bleeding gums       [ x] All others negative	  [ ] Unable to obtain    Vital Signs Last 24 Hrs  T(C): 36.3 (25 Jan 2021 07:25), Max: 36.8 (25 Jan 2021 02:20)  T(F): 97.4 (25 Jan 2021 07:25), Max: 98.2 (25 Jan 2021 02:20)  HR: 61 (25 Jan 2021 07:25) (61 - 73)  BP: 157/89 (25 Jan 2021 07:25) (143/64 - 174/83)  BP(mean): --  RR: 17 (25 Jan 2021 07:25) (17 - 19)  SpO2: 97% (25 Jan 2021 07:25) (97% - 100%)  I&O's Summary      PHYSICAL EXAM:  General: No acute distress  HEENT: EOMI, PERRL  Neck: Supple, No JVD  Lungs: Clear to auscultation bilaterally; No rales or wheezing  Heart: Regular rate and rhythm; No murmurs, rubs, or gallops  Abdomen: Nontender, bowel sounds present  Extremities: No clubbing, cyanosis, or edema  Nervous system:  Alert & Oriented X3, no focal deficits  Psychiatric: Normal affect  Skin: No rashes or lesions      LABS:  01-25    138  |  102  |  19<H>  ----------------------------<  105<H>  3.6   |  29  |  1.02    Ca    8.9      25 Jan 2021 06:18  Phos  3.4     01-25  Mg     2.4     01-25    TPro  7.5  /  Alb  3.8  /  TBili  0.4  /  DBili  x   /  AST  20  /  ALT  26  /  AlkPhos  84  01-25    Creatinine Trend: 1.02<--, 1.07<--                        11.7   6.50  )-----------( 252      ( 25 Jan 2021 06:18 )             35.7     Lipid Panel: Pending     Cardiac Enzymes: CARDIAC MARKERS ( 25 Jan 2021 06:18 )  <0.015 ng/mL / x     / x     / x     / x      CARDIAC MARKERS ( 24 Jan 2021 22:06 )  <0.015 ng/mL / x     / x     / x     / x      CARDIAC MARKERS ( 24 Jan 2021 18:58 )  <0.015 ng/mL / x     / x     / x     / x        RADIOLOGY:   < from: Xray Chest 2 Views PA/Lat (10.28.17 @ 07:29) >  IMPRESSION: Clear lungs.    < end of copied text >    < from: CT Heart with Coronaries (02.26.20 @ 08:49) >  IMPRESSION:    1.  Severe stenosis (>95%) of the mid RCA from noncalcified plaque with positive remodeling.  2.  Severe stenosis of the ostial first diagonal branch from calcified plaque.     < end of copied text >    ECG [my interpretation]: 1/24/21 @ 18:28: Sinus rhythm, normal axis, normal EKG    TELEMETRY: sinus rhythm, no events    ECHO:   < from: Transthoracic Echocardiogram (03.06.19 @ 09:00) >  CONCLUSIONS:  1. Normal left ventricular internal dimensions and wall  thicknesses.  2. Normal Left Ventricular Systolic Function. LVEF 65-70%.  3. Normal right ventricular size and function.  4. Borderline pulmonary hypertension (PASP ~39 mmHg).    *** Compared with echocardiogram of 12/27/2017, no  significant changes noted.    < end of copied text >    STRESS TEST:    < from: Cardiac Treadmill Stress Test (Non Imaging) (Cardiac Treadmill Stress Test (Non Imaging) .) (01.23.20 @ 08:44) >  STRESS TEST IMPRESSIONS:  Exercise capacity: 8 METS, Fair for age and gender. 78% of  MPHR.  Symptom: No Symptom.  BP Response: Hypotensive response (decrease in blood  pressure with stress).  Heart Rhythm: Normal Sinus Rhythm.  ECG Abnormalities: None.  Arrhythmia: None.  Negative ECG evidence of ischemia during submaximal  exercise stress test, however patient had late hypotensive  response concerning for significant coronary artery  disease.    < end of copied text >      CATHETERIZATION:    < from: Cardiac Cath Lab - Adult (03.06.20 @ 09:21) >  CORONARY VESSELS: The coronary circulation is right dominant.  LM:   --  LM: Normal.  LAD:   --  LAD: Angiography showed minor luminal irregularities with no  flow limiting lesions.  --  Proximal LAD: There was a tubular 30 % stenosis.  --  Mid LAD: There was a tubular 30 % stenosis in the middle third of the  vessel segment.  --  Distal LAD: The vessel was small sized. There was a tubular 40 %  stenosis in the distal third of the vessel segment. There was TRINO grade 3  flow through the vessel (brisk flow).  --  D1: There was a tubular 50 % stenosis in the proximal third of the  vessel segment. There was TRINO grade 3 flow through the vessel (brisk  flow) and a small vascular territory distal to the lesion.  CX:   --  Circumflex: Angiography showed minor luminal irregularities with  no flow limiting lesions.  RI:   --  Ramus intermedius: The vessel was large sized.  --  Proximal ramus intermedius: Angiography showed minor luminal  irregularities with no flow limiting lesions.  RCA:   --  Mid RCA: There was a tubular 30 % stenosis in the proximal third  of the vessel segment. In a second lesion, there was a discrete 90 %  stenosis in the middle third of the vessel segment. There was TRINO grade 3  flow through the vessel (brisk flow).  --  Distal RCA: There was a tubular 30 % stenosis in the distal third of  the vessel segment.    < end of copied text >

## 2021-01-25 NOTE — ED PROVIDER NOTE - CHPI ED SYMPTOMS NEG
no diarrhea, no chills, no cough, no shortness of breath/no fever/no nausea/no numbness/no vomiting/no weakness

## 2021-01-25 NOTE — ED PROVIDER NOTE - PATIENT PORTAL LINK FT
You can access the FollowMyHealth Patient Portal offered by [facility] by registering at the following website: [email or web address] By joining [employer]’s FollowMyHealth portal, you will also be able to view your health information using other applications (apps) compatible with our system.

## 2021-01-25 NOTE — CONSULT NOTE ADULT - ASSESSMENT
66 yo M with HTN, HLD, and CAD s/p PCI to RCA (03/2020) who presented with chest pain.     1. Chest pain: Less likely due to cardiac etiology in light of revascularization less than a year ago, as well as negative cardiac enzymes and unremarkable EKG  -Will have patient follow up with his cardiologist top decide on further cardiac testing       2. CAD s/p PCI:     2. HTN:    3. HLD:      66 yo M with HTN, HLD, and CAD s/p PCI to RCA (03/2020) who presented with chest pain.     1. Chest pain: Less likely due to cardiac etiology in light of revascularization less than a year ago, as well as negative cardiac enzymes and unremarkable EKG  -Will have patient follow up with his cardiologist to decide on further cardiac testing       2. CAD s/p PCI: On aspirin, clopidogrel, atorvastatin    3. HTN: On labetalol and lisinopril-HCTZ at home, would resume    4. HLD: Continue atorvastatin    ***Note that this is a preliminary note and any recommendations should NOT be carried out until this note is finalized. ***     66 yo M with HTN, HLD, and CAD s/p PCI to RCA (03/2020) who presented with abdominal/chest pain.     1. Chest/abdominal pain: Less likely due to cardiac etiology in light of revascularization less than a year ago, as well as negative cardiac enzymes and unremarkable EKG  -Will have patient follow up with his cardiologist to decide on further cardiac testing  (Dr. Suarez informed)  -Patient also has OP appointment with GI for further evaluation. Appointment delayed until 03/2021 so that patient completes 12 months of DAPT with clopidogrel before stopping it in case of need for any interventions such as polypectomy, etc.     2. CAD s/p PCI: On aspirin, clopidogrel, atorvastatin    3. HTN: On labetalol and lisinopril-HCTZ at home, would resume    4. HLD: Continue atorvastatin

## 2021-01-25 NOTE — DISCHARGE NOTE PROVIDER - PROVIDER TOKENS
FREE:[LAST:[Guillermo],FIRST:[Cassy],PHONE:[phone]],FAX:[phone]    -],ADDRESS:[[address] [place] [address]]]

## 2021-01-26 ENCOUNTER — EMERGENCY (EMERGENCY)
Facility: HOSPITAL | Age: 66
LOS: 1 days | Discharge: HOME | End: 2021-01-26
Attending: EMERGENCY MEDICINE
Payer: MEDICAID

## 2021-01-26 ENCOUNTER — NON-APPOINTMENT (OUTPATIENT)
Age: 66
End: 2021-01-26

## 2021-01-26 VITALS
OXYGEN SATURATION: 97 % | TEMPERATURE: 97 F | RESPIRATION RATE: 18 BRPM | HEIGHT: 68 IN | SYSTOLIC BLOOD PRESSURE: 215 MMHG | HEART RATE: 73 BPM | WEIGHT: 222.67 LBS | DIASTOLIC BLOOD PRESSURE: 102 MMHG

## 2021-01-26 VITALS
HEART RATE: 72 BPM | TEMPERATURE: 98 F | OXYGEN SATURATION: 97 % | DIASTOLIC BLOOD PRESSURE: 102 MMHG | RESPIRATION RATE: 18 BRPM | HEIGHT: 68 IN | SYSTOLIC BLOOD PRESSURE: 178 MMHG | WEIGHT: 227.96 LBS

## 2021-01-26 VITALS
RESPIRATION RATE: 18 BRPM | TEMPERATURE: 98 F | DIASTOLIC BLOOD PRESSURE: 90 MMHG | HEART RATE: 68 BPM | SYSTOLIC BLOOD PRESSURE: 153 MMHG | OXYGEN SATURATION: 99 %

## 2021-01-26 VITALS
OXYGEN SATURATION: 98 % | RESPIRATION RATE: 18 BRPM | TEMPERATURE: 96 F | SYSTOLIC BLOOD PRESSURE: 150 MMHG | DIASTOLIC BLOOD PRESSURE: 79 MMHG | HEART RATE: 70 BPM

## 2021-01-26 DIAGNOSIS — Z98.890 OTHER SPECIFIED POSTPROCEDURAL STATES: Chronic | ICD-10-CM

## 2021-01-26 DIAGNOSIS — Z96.642 PRESENCE OF LEFT ARTIFICIAL HIP JOINT: Chronic | ICD-10-CM

## 2021-01-26 DIAGNOSIS — K60.3 ANAL FISTULA: Chronic | ICD-10-CM

## 2021-01-26 LAB
ALBUMIN SERPL ELPH-MCNC: 4 G/DL — SIGNIFICANT CHANGE UP (ref 3.5–5)
ALP SERPL-CCNC: 90 U/L — SIGNIFICANT CHANGE UP (ref 40–120)
ALT FLD-CCNC: 28 U/L DA — SIGNIFICANT CHANGE UP (ref 10–60)
ANION GAP SERPL CALC-SCNC: 9 MMOL/L — SIGNIFICANT CHANGE UP (ref 5–17)
APPEARANCE UR: CLEAR — SIGNIFICANT CHANGE UP
AST SERPL-CCNC: 20 U/L — SIGNIFICANT CHANGE UP (ref 10–40)
BASOPHILS # BLD AUTO: 0.02 K/UL — SIGNIFICANT CHANGE UP (ref 0–0.2)
BASOPHILS NFR BLD AUTO: 0.2 % — SIGNIFICANT CHANGE UP (ref 0–2)
BILIRUB SERPL-MCNC: 0.6 MG/DL — SIGNIFICANT CHANGE UP (ref 0.2–1.2)
BILIRUB UR-MCNC: NEGATIVE — SIGNIFICANT CHANGE UP
BUN SERPL-MCNC: 21 MG/DL — HIGH (ref 7–18)
CALCIUM SERPL-MCNC: 8.7 MG/DL — SIGNIFICANT CHANGE UP (ref 8.4–10.5)
CHLORIDE SERPL-SCNC: 98 MMOL/L — SIGNIFICANT CHANGE UP (ref 96–108)
CO2 SERPL-SCNC: 27 MMOL/L — SIGNIFICANT CHANGE UP (ref 22–31)
COLOR SPEC: YELLOW — SIGNIFICANT CHANGE UP
CREAT SERPL-MCNC: 1 MG/DL — SIGNIFICANT CHANGE UP (ref 0.5–1.3)
CULTURE RESULTS: SIGNIFICANT CHANGE UP
DIFF PNL FLD: NEGATIVE — SIGNIFICANT CHANGE UP
EOSINOPHIL # BLD AUTO: 0.06 K/UL — SIGNIFICANT CHANGE UP (ref 0–0.5)
EOSINOPHIL NFR BLD AUTO: 0.7 % — SIGNIFICANT CHANGE UP (ref 0–6)
GLUCOSE SERPL-MCNC: 117 MG/DL — HIGH (ref 70–99)
GLUCOSE UR QL: NEGATIVE — SIGNIFICANT CHANGE UP
HCT VFR BLD CALC: 36 % — LOW (ref 39–50)
HGB BLD-MCNC: 12.1 G/DL — LOW (ref 13–17)
IMM GRANULOCYTES NFR BLD AUTO: 0.3 % — SIGNIFICANT CHANGE UP (ref 0–1.5)
KETONES UR-MCNC: NEGATIVE — SIGNIFICANT CHANGE UP
LEUKOCYTE ESTERASE UR-ACNC: NEGATIVE — SIGNIFICANT CHANGE UP
LYMPHOCYTES # BLD AUTO: 1.58 K/UL — SIGNIFICANT CHANGE UP (ref 1–3.3)
LYMPHOCYTES # BLD AUTO: 17.2 % — SIGNIFICANT CHANGE UP (ref 13–44)
MCHC RBC-ENTMCNC: 29.3 PG — SIGNIFICANT CHANGE UP (ref 27–34)
MCHC RBC-ENTMCNC: 33.6 GM/DL — SIGNIFICANT CHANGE UP (ref 32–36)
MCV RBC AUTO: 87.2 FL — SIGNIFICANT CHANGE UP (ref 80–100)
MONOCYTES # BLD AUTO: 0.43 K/UL — SIGNIFICANT CHANGE UP (ref 0–0.9)
MONOCYTES NFR BLD AUTO: 4.7 % — SIGNIFICANT CHANGE UP (ref 2–14)
NEUTROPHILS # BLD AUTO: 7.04 K/UL — SIGNIFICANT CHANGE UP (ref 1.8–7.4)
NEUTROPHILS NFR BLD AUTO: 76.9 % — SIGNIFICANT CHANGE UP (ref 43–77)
NITRITE UR-MCNC: NEGATIVE — SIGNIFICANT CHANGE UP
NRBC # BLD: 0 /100 WBCS — SIGNIFICANT CHANGE UP (ref 0–0)
PH UR: 5 — SIGNIFICANT CHANGE UP (ref 5–8)
PLATELET # BLD AUTO: 266 K/UL — SIGNIFICANT CHANGE UP (ref 150–400)
POTASSIUM SERPL-MCNC: 3.3 MMOL/L — LOW (ref 3.5–5.3)
POTASSIUM SERPL-SCNC: 3.3 MMOL/L — LOW (ref 3.5–5.3)
PROT SERPL-MCNC: 7.6 G/DL — SIGNIFICANT CHANGE UP (ref 6–8.3)
PROT UR-MCNC: NEGATIVE — SIGNIFICANT CHANGE UP
RBC # BLD: 4.13 M/UL — LOW (ref 4.2–5.8)
RBC # FLD: 12.5 % — SIGNIFICANT CHANGE UP (ref 10.3–14.5)
SARS-COV-2 RNA SPEC QL NAA+PROBE: SIGNIFICANT CHANGE UP
SODIUM SERPL-SCNC: 134 MMOL/L — LOW (ref 135–145)
SP GR SPEC: 1.01 — SIGNIFICANT CHANGE UP (ref 1.01–1.02)
SPECIMEN SOURCE: SIGNIFICANT CHANGE UP
TROPONIN I SERPL-MCNC: <0.015 NG/ML — SIGNIFICANT CHANGE UP (ref 0–0.04)
UROBILINOGEN FLD QL: NEGATIVE — SIGNIFICANT CHANGE UP
WBC # BLD: 9.16 K/UL — SIGNIFICANT CHANGE UP (ref 3.8–10.5)
WBC # FLD AUTO: 9.16 K/UL — SIGNIFICANT CHANGE UP (ref 3.8–10.5)

## 2021-01-26 PROCEDURE — U0005: CPT

## 2021-01-26 PROCEDURE — 71045 X-RAY EXAM CHEST 1 VIEW: CPT

## 2021-01-26 PROCEDURE — 80053 COMPREHEN METABOLIC PANEL: CPT

## 2021-01-26 PROCEDURE — 99285 EMERGENCY DEPT VISIT HI MDM: CPT

## 2021-01-26 PROCEDURE — 99283 EMERGENCY DEPT VISIT LOW MDM: CPT | Mod: 25

## 2021-01-26 PROCEDURE — 99284 EMERGENCY DEPT VISIT MOD MDM: CPT

## 2021-01-26 PROCEDURE — 36415 COLL VENOUS BLD VENIPUNCTURE: CPT

## 2021-01-26 PROCEDURE — 87635 SARS-COV-2 COVID-19 AMP PRB: CPT

## 2021-01-26 PROCEDURE — 87493 C DIFF AMPLIFIED PROBE: CPT

## 2021-01-26 PROCEDURE — 93005 ELECTROCARDIOGRAM TRACING: CPT

## 2021-01-26 PROCEDURE — 99283 EMERGENCY DEPT VISIT LOW MDM: CPT

## 2021-01-26 PROCEDURE — 85025 COMPLETE CBC W/AUTO DIFF WBC: CPT

## 2021-01-26 PROCEDURE — 84484 ASSAY OF TROPONIN QUANT: CPT

## 2021-01-26 PROCEDURE — 81003 URINALYSIS AUTO W/O SCOPE: CPT

## 2021-01-26 PROCEDURE — 71045 X-RAY EXAM CHEST 1 VIEW: CPT | Mod: 26

## 2021-01-26 RX ORDER — LABETALOL HCL 100 MG
100 TABLET ORAL ONCE
Refills: 0 | Status: COMPLETED | OUTPATIENT
Start: 2021-01-26 | End: 2021-01-26

## 2021-01-26 RX ORDER — ACETAMINOPHEN 500 MG
650 TABLET ORAL ONCE
Refills: 0 | Status: COMPLETED | OUTPATIENT
Start: 2021-01-26 | End: 2021-01-26

## 2021-01-26 RX ORDER — POTASSIUM CHLORIDE 20 MEQ
40 PACKET (EA) ORAL ONCE
Refills: 0 | Status: COMPLETED | OUTPATIENT
Start: 2021-01-26 | End: 2021-01-26

## 2021-01-26 RX ADMIN — Medication 100 MILLIGRAM(S): at 02:23

## 2021-01-26 RX ADMIN — Medication 650 MILLIGRAM(S): at 11:04

## 2021-01-26 RX ADMIN — Medication 40 MILLIEQUIVALENT(S): at 03:57

## 2021-01-26 NOTE — ED PROVIDER NOTE - PROGRESS NOTE DETAILS
Pt repeat blood pressure checked in both arms, 154/85. Pt still complaining of headache, no neurological signs.   requested to see patient, advised to take labetalol 100mg three times a day along with lisinopril/Htcz 20/25 once daily and follow up with pcp/cardiologist/ within a week.  [facility] called and number left for return call to discuss current medication regimen

## 2021-01-26 NOTE — ED PROVIDER NOTE - ATTENDING CONTRIBUTION TO CARE
65yoM with h/o HTN, HLD, CAD, GERD, presents with HTN noted x 3 days. Has intermittent frontal pressure HA though not taking analgesia for this, also having gassy feeling in his abdomen and some watery diarrhea, also states intermittently has L sided pinching CP lasting for seconds. Recently admitted for CP and HTN, seen by cardiology and d/w his outpt cardiologist, has had 4 negative trops and negative w/u. Labetalol 100mg BID and lisinopril/HCTZ. On exam, afebrile, hemodynamically stable, saturating well, NAD, well appearing, laying comfortably in bed, no WOB, head NCAT, EOMI grossly, anicteric, MMM, no JVD, RRR, nml S1/S2, no m/r/g, lungs CTAB, no w/r/r, abd soft, NT, ND, nml BS, no rebound or guarding, AAO, CN's 3-12 grossly intact, WILKINS spontaneously, no leg cyanosis or edema, skin warm, well perfused, no rashes or hives. Character low suspicion for ACS and yet another ECG today unremarkable. Character low suspicion for dissection and no murmur or pulse asymmetry. Character low suspicion for PE and no e/o DVT or tachycardia/hypoxia. Abdomen entirely benign with low suspicion for acute process. No e/o fluid overload. No neuro findings to suggest CVA. Character of HA low suspicion for being caused by HTN and still has HA with decrease in BP, has not attempted analgesia at home. Will attempt C diff test and COVID in light of diarrhea, low suspicion for C diff and will wait for results. Seen by Dr. Kamara who has seen pt in the past, recommends pt increase to 100mg labetalol TID. Patient is well appearing, NAD, afebrile, hemodynamically stable. Any available tests and studies were discussed with patient. Discharged with instructions in further symptomatic care, return precautions, and need for PMD and cardio f/u.

## 2021-01-26 NOTE — ED PROVIDER NOTE - NSFOLLOWUPINSTRUCTIONS_ED_ALL_ED_FT
Please follow up with your primary care doctor and cardiologist in 1-2 days.  Please use acetaminophen as needed for pain.  Please start taking your 100mg labetalol THREE (3) times a day.  Please return to the emergency department if you have worsening chest pain, shortness of breath, dizziness, vomiting, fever, or any other symptoms. Ryne un seguimiento con ly médico de atención primaria y cardiólogo en 1-2 días.  Utilice acetaminofén según sea necesario para el dolor.  Empiece a karen ly labetalol de 100 mg NAOMY (3) veces al día.  Regrese a la onesimo de emergencias si tiene un dolor de pecho que empeora, falta de aire, mareos, vómitos, fiebre o cualquier otro síntoma.

## 2021-01-26 NOTE — ED ADULT TRIAGE NOTE - CHIEF COMPLAINT QUOTE
c/o high blood pressure and headache . reports was seen here yesterday for same discharged around 0430   states checked blood pressure this morning and is on the "200's " called  his doctor and told him to go back to the ER

## 2021-01-26 NOTE — ED PROVIDER NOTE - NSFOLLOWUPCLINICS_GEN_ALL_ED_FT
[facility]  Internal Medicine  [address] [place] [address]  Phone: [phone]  Fax: [phone]  Follow Up Time:

## 2021-01-26 NOTE — ED PROVIDER NOTE - PHYSICAL EXAMINATION
PHYSICAL EXAM:  GENERAL: NAD, speaks in full sentences, no signs of respiratory distress  HEAD:  Atraumatic, Normocephalic  EYES: EOMI, PERRLA, conjunctiva and sclera clear  NECK: Supple, No JVD  CHEST/LUNG: Clear to auscultation bilaterally  HEART: Regular rate and rhythm; No murmurs  ABDOMEN: Soft, Nontender, Nondistended; Bowel sounds present  EXTREMITIES:  2+ Peripheral Pulses, No cyanosis or edema  PSYCH: AAOx3  NEUROLOGY: non-focal  SKIN: ecchymosis on abdomen at lovenox injection site per patient

## 2021-01-26 NOTE — ED PROVIDER NOTE - NS ED ROS FT
REVIEW OF SYSTEMS:    CONSTITUTIONAL: + headache, neck pain  EYES/ENT: No visual changes;  No vertigo or throat pain   NECK: +pain, No stiffness  RESPIRATORY: No cough or sob  CARDIOVASCULAR: + epigastric discomfort  GASTROINTESTINAL: Diffuse abdominal discomfort  GENITOURINARY: No dysuria, frequency or hematuria  NEUROLOGICAL: No numbness or weakness  SKIN: No itching, rashes

## 2021-01-26 NOTE — ED PROVIDER NOTE - NSFOLLOWUPINSTRUCTIONS_ED_ALL_ED_FT
Hypertension    WHAT YOU NEED TO KNOW:    What is hypertension? Hypertension is high blood pressure. Your blood pressure is the force of your blood moving against the walls of your arteries. Hypertension causes your blood pressure to get so high that your heart has to work much harder than normal. This can damage your heart. The cause of hypertension may not be known. This is called essential or primary hypertension. Hypertension caused by another medical condition, such as kidney disease, is called secondary hypertension.    What do I need to know about the stages of hypertension?     Blood Pressure Readings     •Normal blood pressure is 119/79 or lower. Your healthcare provider may only check your blood pressure each year if it stays at a normal level.      •Elevated blood pressure is 120/79 to 129/79. This is sometimes called prehypertension. Your healthcare provider may suggest lifestyle changes to help lower your blood pressure to a normal level. He or she may then check it again in 3 to 6 months.      •Stage 1 hypertension is 130/80 to 139/89. Your provider may recommend lifestyle changes, medication, and checks every 3 to 6 months until your blood pressure is controlled.      •Stage 2 hypertension is 140/90 or higher. Your provider will recommend lifestyle changes and have you take 2 kinds of hypertension medicines. You will also need to have your blood pressure checked monthly until it is controlled.      What increases my risk for hypertension?   •Age older than 55 years (men) or 65 (women)      •Stress, or a family history of hypertension or heart disease      •Obesity, lack of exercise, or too many high-sodium foods      •Use of tobacco, alcohol, or illegal drugs      •A medical condition, such as diabetes, kidney disease, thyroid disease, or adrenal gland disorder      •Certain medicines, such as steroids or birth control pills      What are the signs and symptoms of hypertension? You may have no signs or symptoms, or you may have any of the following:   •Headache      •Blurred vision      •Chest pain      •Dizziness or weakness      •Trouble breathing      •Nosebleeds      How is hypertension diagnosed? Your healthcare provider will take your blood pressure at several visits. You may also need to check your blood pressure at home. The provider will examine you and ask what medicines you take. He or she will also ask if you have a family history of high blood pressure and about any health conditions you have. He or she will also check your blood pressure and weight and examine your heart, lungs, and eyes. You may need any of the following tests:   •An ambulatory blood pressure monitor (ABPM) is a device that you wear. ABPM measures your blood pressure while you do your regular daily activities. It records your blood pressure every 15 to 30 minutes during the day. It also records your blood pressure every 15 minutes to 1 hour at night. The recorded blood pressures help your healthcare provider know if you have hypertension not seen at your appointment.      •Blood tests may help healthcare providers find the cause of your hypertension. Blood tests can also help find other health problems caused by hypertension.      •Urine tests will be done to check your kidney function. Kidney problems can increase your risk for hypertension.      What medicines are used to treat hypertension?   •Antihypertensives may be used to help lower your blood pressure. Several kinds of medicines are available. Your healthcare provider will choose medicines based on the kind of hypertension you have. You may need more than one type of medicine. Take the medicine exactly as directed.       •Diuretics help decrease extra fluid that collects in your body. This will help lower your blood pressure. You may urinate more often while you take this medicine.      •Cholesterol medicine helps lower your cholesterol level. A low cholesterol level helps prevent heart disease and makes it easier to control your blood pressure.       What can I do to manage hypertension?   •Check your blood pressure at home. Avoid smoking, caffeine, and exercise at least 30 minutes before checking your blood pressure. Sit and rest for 5 minutes before you take your blood pressure. Extend your arm and support it on a flat surface. Your arm should be at the same level as your heart. Follow the directions that came with your blood pressure monitor. Check your blood pressure 2 times, 1 minute apart, before you take your medicine in the morning. Also check your blood pressure before your evening meal. Keep a record of your readings and bring it to your follow-up visits. Ask your healthcare provider what your blood pressure should be.   How to take a Blood Pressure           •Manage any other health conditions you have. Health conditions such as diabetes can increase your risk for hypertension. Follow your healthcare provider's instructions and take all your medicines as directed.       •Ask about all medicines. Certain medicines can increase your blood pressure. Examples include oral birth control pills, decongestants, herbal supplements, and NSAIDs, such as ibuprofen. Your healthcare provider can tell you which medicines are safe for you to take. This includes prescription and over-the-counter medicines.      What lifestyle changes can I make to manage hypertension?   •Limit sodium (salt) as directed. Too much sodium can affect your fluid balance. Check labels to find low-sodium or no-salt-added foods. Some low-sodium foods use potassium salts for flavor. Too much potassium can also cause health problems. Your healthcare provider will tell you how much sodium and potassium are safe for you to have in a day. He or she may recommend that you limit sodium to 2,300 mg a day.             •Follow the meal plan recommended by your healthcare provider. A dietitian or your provider can give you more information on low-sodium plans or the DASH (Dietary Approaches to Stop Hypertension) eating plan. The DASH plan is low in sodium, unhealthy fats, and total fat. It is high in potassium, calcium, and fiber.              •Exercise to maintain a healthy weight. Exercise at least 30 minutes per day, on most days of the week. This will help decrease your blood pressure. Ask your healthcare provider about the best exercise plan for you.   Walking for Exercise           •Decrease stress. This may help lower your blood pressure. Learn ways to relax, such as deep breathing or listening to music.      •Limit alcohol as directed. Alcohol can increase your blood pressure. A drink of alcohol is 12 ounces of beer, 5 ounces of wine, or 1½ ounces of liquor.      •Do not smoke. Nicotine and other chemicals in cigarettes and cigars can increase your blood pressure and also cause lung damage. Ask your healthcare provider for information if you currently smoke and need help to quit. E-cigarettes or smokeless tobacco still contain nicotine. Talk to your healthcare provider before you use these products.   Prevent Heart Disease            Call 911 for any of the following:   •You have chest pain.      •You have any of the following signs of a heart attack: ?Squeezing, pressure, or pain in your chest      ?You may also have any of the following: ?Discomfort or pain in your back, neck, jaw, stomach, or arm      ?Shortness of breath      ?Nausea or vomiting      ?Lightheadedness or a sudden cold sweat        •You become confused or have difficulty speaking.      •You suddenly feel lightheaded or have trouble breathing.      When should I seek immediate care?   •You have a severe headache or vision loss.      •You have weakness in an arm or leg.       When should I contact my healthcare provider?   •You feel faint, dizzy, confused, or drowsy.      •You have been taking your blood pressure medicine but your pressure is higher than your provider says it should be.      •You have questions or concerns about your condition or care.      CARE AGREEMENT:    You have the right to help plan your care. Learn about your health condition and how it may be treated. Discuss treatment options with your healthcare providers to decide what care you want to receive. You always have the right to refuse treatment.

## 2021-01-26 NOTE — ED PROVIDER NOTE - PATIENT PORTAL LINK FT
You can access the FollowMyHealth Patient Portal offered by [facility] by registering at the following website: [email or web address]. By joining [employer]’s FollowMyHealth portal, you will also be able to view your health information using other applications (apps) compatible with our system.

## 2021-01-26 NOTE — ED PROVIDER NOTE - OBJECTIVE STATEMENT
Pt is a 65 year old male from home with PMH of HLD, HTN( On labetalol 100 bid & Lisinopril/htcz 20-25), GERD, CAD(Stent 3/2020) who presented with high blood pressure and headache. Pt was seen in ED overnight for similar complaints and underwent EKG, Trop that were negative for any acute changes. Pt was given one dose of labetalol and was discharged once blood pressure improved. Pt was recently admitted for chest pain and elevated blood pressure, was seen by cardiologist who recommended follow up with patient cardiologist Dr.Joe Suarez. Pt states that once he went home from ED last night, his blood pressure was again elevated with headache. Pt denies any fevers, chills, sob, cough, nausea or vomiting. Pt does report some diffuse abdominal discomfort.

## 2021-01-26 NOTE — ED PROVIDER NOTE - OBJECTIVE STATEMENT
Pt ws admitted on 1/24 for chest discomfort stable for discharge in follow ing am. Pt returned to ER on 1/25 for headache and high blood pressure and was stable for discharge.  Pt states he has been checking his blood pressure frequently at home and recorded SBP of 200 prompting him to return to ER for further evaluation.   Pt states he took Lisinopril 1 hr prior to arrival. At 2am pt is resting comfortably, no meningeal signs, no chest pain, no shortness of breath, current BP is 176/93.  Pt denies feeling excessively anxious or depressed.

## 2021-01-26 NOTE — ED PROVIDER NOTE - PATIENT PORTAL LINK FT
You can access the FollowMyHealth Patient Portal offered by [facility] by registering at the following website: [email or web address]. By joining [employer]’s FollowMyHealth portal, you will also be able to view your health information using other applications (apps) compatible with our system.

## 2021-01-26 NOTE — ED PROVIDER NOTE - CLINICAL SUMMARY MEDICAL DECISION MAKING FREE TEXT BOX
no
405a- BP now 150/93, pt feels better. Pt is well appearing, has no new complaints and able to walk with normal gait. Pt is stable for discharge and follow up with medical doctor. Pt educated on care and need for follow up. Discussed anticipatory guidance and return precautions. Questions answered. I had a detailed discussion with the patient and/or guardian regarding the historical points, exam findings, and any diagnostic results supporting the discharge diagnosis.

## 2021-01-27 ENCOUNTER — INPATIENT (INPATIENT)
Facility: HOSPITAL | Age: 66
LOS: 2 days | Discharge: HOME | DRG: 641 | End: 2021-01-30
Attending: STUDENT IN AN ORGANIZED HEALTH CARE EDUCATION/TRAINING PROGRAM | Admitting: STUDENT IN AN ORGANIZED HEALTH CARE EDUCATION/TRAINING PROGRAM
Payer: MEDICAID

## 2021-01-27 VITALS
DIASTOLIC BLOOD PRESSURE: 73 MMHG | WEIGHT: 222.67 LBS | HEART RATE: 63 BPM | SYSTOLIC BLOOD PRESSURE: 117 MMHG | TEMPERATURE: 96 F | HEIGHT: 68 IN | RESPIRATION RATE: 20 BRPM | OXYGEN SATURATION: 96 %

## 2021-01-27 DIAGNOSIS — R55 SYNCOPE AND COLLAPSE: ICD-10-CM

## 2021-01-27 DIAGNOSIS — Z98.890 OTHER SPECIFIED POSTPROCEDURAL STATES: Chronic | ICD-10-CM

## 2021-01-27 DIAGNOSIS — K60.3 ANAL FISTULA: Chronic | ICD-10-CM

## 2021-01-27 DIAGNOSIS — Z96.642 PRESENCE OF LEFT ARTIFICIAL HIP JOINT: Chronic | ICD-10-CM

## 2021-01-27 LAB
ACETONE SERPL-MCNC: NEGATIVE — SIGNIFICANT CHANGE UP
ALBUMIN SERPL ELPH-MCNC: 4.4 G/DL — SIGNIFICANT CHANGE UP (ref 3.5–5)
ALP SERPL-CCNC: 87 U/L — SIGNIFICANT CHANGE UP (ref 40–120)
ALT FLD-CCNC: 29 U/L DA — SIGNIFICANT CHANGE UP (ref 10–60)
ANION GAP SERPL CALC-SCNC: 9 MMOL/L — SIGNIFICANT CHANGE UP (ref 5–17)
APPEARANCE UR: CLEAR — SIGNIFICANT CHANGE UP
AST SERPL-CCNC: 24 U/L — SIGNIFICANT CHANGE UP (ref 10–40)
BACTERIA # UR AUTO: ABNORMAL /HPF
BASOPHILS # BLD AUTO: 0.01 K/UL — SIGNIFICANT CHANGE UP (ref 0–0.2)
BASOPHILS NFR BLD AUTO: 0.1 % — SIGNIFICANT CHANGE UP (ref 0–2)
BILIRUB SERPL-MCNC: 0.5 MG/DL — SIGNIFICANT CHANGE UP (ref 0.2–1.2)
BILIRUB UR-MCNC: ABNORMAL
BUN SERPL-MCNC: 33 MG/DL — HIGH (ref 7–18)
C DIFF BY PCR RESULT: SIGNIFICANT CHANGE UP
C DIFF TOX GENS STL QL NAA+PROBE: SIGNIFICANT CHANGE UP
CALCIUM SERPL-MCNC: 8.6 MG/DL — SIGNIFICANT CHANGE UP (ref 8.4–10.5)
CHLORIDE SERPL-SCNC: 97 MMOL/L — SIGNIFICANT CHANGE UP (ref 96–108)
CK SERPL-CCNC: 397 U/L — HIGH (ref 35–232)
CO2 SERPL-SCNC: 27 MMOL/L — SIGNIFICANT CHANGE UP (ref 22–31)
COLOR SPEC: YELLOW — SIGNIFICANT CHANGE UP
CREAT SERPL-MCNC: 1.87 MG/DL — HIGH (ref 0.5–1.3)
DIFF PNL FLD: ABNORMAL
EOSINOPHIL # BLD AUTO: 0.04 K/UL — SIGNIFICANT CHANGE UP (ref 0–0.5)
EOSINOPHIL NFR BLD AUTO: 0.4 % — SIGNIFICANT CHANGE UP (ref 0–6)
GLUCOSE SERPL-MCNC: 154 MG/DL — HIGH (ref 70–99)
GLUCOSE UR QL: NEGATIVE — SIGNIFICANT CHANGE UP
HCT VFR BLD CALC: 37.2 % — LOW (ref 39–50)
HGB BLD-MCNC: 12.5 G/DL — LOW (ref 13–17)
HYALINE CASTS # UR AUTO: ABNORMAL /LPF
IMM GRANULOCYTES NFR BLD AUTO: 0.4 % — SIGNIFICANT CHANGE UP (ref 0–1.5)
KETONES UR-MCNC: ABNORMAL
LACTATE SERPL-SCNC: 1.2 MMOL/L — SIGNIFICANT CHANGE UP (ref 0.7–2)
LEUKOCYTE ESTERASE UR-ACNC: ABNORMAL
LIDOCAIN IGE QN: 153 U/L — SIGNIFICANT CHANGE UP (ref 73–393)
LYMPHOCYTES # BLD AUTO: 1.16 K/UL — SIGNIFICANT CHANGE UP (ref 1–3.3)
LYMPHOCYTES # BLD AUTO: 12.2 % — LOW (ref 13–44)
MAGNESIUM SERPL-MCNC: 2.7 MG/DL — HIGH (ref 1.6–2.6)
MCHC RBC-ENTMCNC: 29.3 PG — SIGNIFICANT CHANGE UP (ref 27–34)
MCHC RBC-ENTMCNC: 33.6 GM/DL — SIGNIFICANT CHANGE UP (ref 32–36)
MCV RBC AUTO: 87.1 FL — SIGNIFICANT CHANGE UP (ref 80–100)
MONOCYTES # BLD AUTO: 0.58 K/UL — SIGNIFICANT CHANGE UP (ref 0–0.9)
MONOCYTES NFR BLD AUTO: 6.1 % — SIGNIFICANT CHANGE UP (ref 2–14)
NEUTROPHILS # BLD AUTO: 7.67 K/UL — HIGH (ref 1.8–7.4)
NEUTROPHILS NFR BLD AUTO: 80.8 % — HIGH (ref 43–77)
NITRITE UR-MCNC: NEGATIVE — SIGNIFICANT CHANGE UP
NRBC # BLD: 0 /100 WBCS — SIGNIFICANT CHANGE UP (ref 0–0)
PH UR: 5 — SIGNIFICANT CHANGE UP (ref 5–8)
PLATELET # BLD AUTO: 302 K/UL — SIGNIFICANT CHANGE UP (ref 150–400)
POTASSIUM SERPL-MCNC: 3.6 MMOL/L — SIGNIFICANT CHANGE UP (ref 3.5–5.3)
POTASSIUM SERPL-SCNC: 3.6 MMOL/L — SIGNIFICANT CHANGE UP (ref 3.5–5.3)
PROT SERPL-MCNC: 7.7 G/DL — SIGNIFICANT CHANGE UP (ref 6–8.3)
PROT UR-MCNC: 30 MG/DL
RBC # BLD: 4.27 M/UL — SIGNIFICANT CHANGE UP (ref 4.2–5.8)
RBC # FLD: 13 % — SIGNIFICANT CHANGE UP (ref 10.3–14.5)
RBC CASTS # UR COMP ASSIST: ABNORMAL /HPF (ref 0–2)
SARS-COV-2 RNA SPEC QL NAA+PROBE: SIGNIFICANT CHANGE UP
SODIUM SERPL-SCNC: 133 MMOL/L — LOW (ref 135–145)
SP GR SPEC: 1.02 — SIGNIFICANT CHANGE UP (ref 1.01–1.02)
TROPONIN I SERPL-MCNC: <0.015 NG/ML — SIGNIFICANT CHANGE UP (ref 0–0.04)
UROBILINOGEN FLD QL: 1
WBC # BLD: 9.5 K/UL — SIGNIFICANT CHANGE UP (ref 3.8–10.5)
WBC # FLD AUTO: 9.5 K/UL — SIGNIFICANT CHANGE UP (ref 3.8–10.5)
WBC UR QL: ABNORMAL /HPF (ref 0–5)

## 2021-01-27 PROCEDURE — 99285 EMERGENCY DEPT VISIT HI MDM: CPT

## 2021-01-27 PROCEDURE — 70450 CT HEAD/BRAIN W/O DYE: CPT | Mod: 26

## 2021-01-27 PROCEDURE — 71045 X-RAY EXAM CHEST 1 VIEW: CPT | Mod: 26

## 2021-01-27 PROCEDURE — 99222 1ST HOSP IP/OBS MODERATE 55: CPT

## 2021-01-27 RX ORDER — HEPARIN SODIUM 5000 [USP'U]/ML
5000 INJECTION INTRAVENOUS; SUBCUTANEOUS EVERY 8 HOURS
Refills: 0 | Status: DISCONTINUED | OUTPATIENT
Start: 2021-01-27 | End: 2021-01-30

## 2021-01-27 RX ORDER — ACETAMINOPHEN 500 MG
975 TABLET ORAL ONCE
Refills: 0 | Status: COMPLETED | OUTPATIENT
Start: 2021-01-27 | End: 2021-01-27

## 2021-01-27 RX ORDER — SODIUM CHLORIDE 9 MG/ML
1000 INJECTION INTRAMUSCULAR; INTRAVENOUS; SUBCUTANEOUS ONCE
Refills: 0 | Status: COMPLETED | OUTPATIENT
Start: 2021-01-27 | End: 2021-01-27

## 2021-01-27 RX ORDER — SODIUM CHLORIDE 9 MG/ML
1000 INJECTION INTRAMUSCULAR; INTRAVENOUS; SUBCUTANEOUS
Refills: 0 | Status: DISCONTINUED | OUTPATIENT
Start: 2021-01-27 | End: 2021-01-28

## 2021-01-27 RX ADMIN — SODIUM CHLORIDE 150 MILLILITER(S): 9 INJECTION INTRAMUSCULAR; INTRAVENOUS; SUBCUTANEOUS at 21:44

## 2021-01-27 RX ADMIN — HEPARIN SODIUM 5000 UNIT(S): 5000 INJECTION INTRAVENOUS; SUBCUTANEOUS at 21:44

## 2021-01-27 RX ADMIN — SODIUM CHLORIDE 1000 MILLILITER(S): 9 INJECTION INTRAMUSCULAR; INTRAVENOUS; SUBCUTANEOUS at 21:17

## 2021-01-27 RX ADMIN — Medication 975 MILLIGRAM(S): at 21:17

## 2021-01-27 NOTE — ED PROVIDER NOTE - OBJECTIVE STATEMENT
65 y.o. male with h/o HTN, pt saw PMD yest., found B/P was 180/?, PMD changed Lisinopril to Amlodipine 10mg.  Pt took his meds @ 12pm.  An hour later, pt suddenly felt dizzy, also lower abd pain.  Pt walked to the kitchen, & proceed to sit down.  Pt with sweating, LOC, also with fecal incontinence.  Abd pain resolved after BM.  No fever, CP, sob. injury, n/v.  EMS found pt's B/P 82/58.  Pt was seen in ED x2 in past 2 days for elevated B/P.

## 2021-01-27 NOTE — H&P ADULT - ASSESSMENT
64 yo male with pmh of anxiety, CAD (s/p stents in march 2020), HTN, hld presented to ED after a brief witnessed syncopal episode. Pt is admitted for close monitoring

## 2021-01-27 NOTE — ED PROVIDER NOTE - CLINICAL SUMMARY MEDICAL DECISION MAKING FREE TEXT BOX
pt with syncope, hypotension.  Pt claims his PMD just switched his meds yest., unlikely 2/2 meds., will get labs, EKG, give IVF, poss admission

## 2021-01-27 NOTE — H&P ADULT - ATTENDING COMMENTS
Pt seen and examined  Case discussed with admitting resident.  65 year old man with PMH of HTN recently admitted here on account of poorly controlled HTN. He was seen in his PMD's office yesterday and his BP meds adjusted with combination lisinopril/HCTZ () substituted with amlodipine 10mg.  HE used his 1st dose today and shortly afterwards became pale, diaphoretic with nausea and an episode of loose stool followed by brief episode of fainting.   No other finding on ROS.  EMS noted BP to be 82/58 on arrival       Vital Signs Last 24 Hrs  T(C): 36.2 (2021 20:37), Max: 36.2 (2021 20:37)  T(F): 97.1 (2021 20:37), Max: 97.1 (2021 20:37)  HR: 68 (2021 20:37) (63 - 68)  BP: 140/81 (2021 20:37) (117/73 - 151/88)  RR: 17 (2021 20:37) (17 - 20)  SpO2: 97% (2021 20:37) (96% - 97%)    Exam      Labs                        12.5   9.50  )-----------( 302      ( 2021 18:06 )             37.2                    133<L>  |  97  |  33<H>  ----------------------------<  154<H>  3.6   |  27  |  1.87<H>    Ca    8.6      2021 18:06  Mg     2.7         TPro  7.7  /  Alb  4.4  /  TBili  0.5  /  DBili  x   /  AST  24  /  ALT  29  /  AlkPhos  87      CK -397    Urinalysis Basic - ( 2021 19:57 )    Color: Yellow / Appearance: Clear / S.020 / pH: x  Gluc: x / Ketone: Trace  / Bili: Small / Urobili: 1   Blood: x / Protein: 30 mg/dL / Nitrite: Negative   Leuk Esterase: Trace / RBC: 5-10 /HPF / WBC 6-10 /HPF   Sq Epi: x / Non Sq Epi: x / Bacteria: Few /HPF    ct HEAD - unremarkable for acute changes    CXR - no active disease    EKG - NSR    Impression  - Syncope   1) Vasovagal, orthostatic with hypotension related to BP med use  2) Reduced oral intake and hypovolemia might have contributed especially since pt was on a diuretic until recently  - Resolved with IVF challenge; will continue isotonic IVF infusion normal saline @ 100cc/hour  - Admit and observe on telemetry overnight    - MINI - likely pre-renal from above  Continue IVF hydration as above  Repeat chemistry in AM    - Hypertension   Pt with elevated BP at the beginning of the week and now hypotensive episode with change in meds.  Pt was compliant on his old regimen and had no issues until recently.   Will observe off BP meds overnight and initiate BP meds gradually in AM starting with beta-blocker. Regular check of BP at home and PCP follow up within a week on discharge. Pt seen and examined  Case discussed with admitting resident.  65 year old man with PMH of HTN recently admitted here on account of poorly controlled HTN. He was seen in his PMD's office yesterday and his BP meds adjusted with combination lisinopril/HCTZ (20/) substituted with amlodipine/benzapril 10/20 mg.  HE used his 1st dose today and shortly afterwards became pale, diaphoretic with nausea and an episode of loose stool followed by brief episode of fainting.   No other finding on ROS.  EMS noted BP to be 82/58 on arrival       Vital Signs Last 24 Hrs  T(C): 36.2 (2021 20:37), Max: 36.2 (2021 20:37)  T(F): 97.1 (2021 20:37), Max: 97.1 (2021 20:37)  HR: 68 (2021 20:37) (63 - 68)  BP: 140/81 (2021 20:37) (117/73 - 151/88)  RR: 17 (2021 20:37) (17 - 20)  SpO2: 97% (2021 20:37) (96% - 97%)    Exam  Middle aged man, NAD AAO X 3  CTA B/L RRR S1S2 only  Soft NT ND BS +  No pedal edema  No focal deficits    Labs                        12.5   9.50  )-----------( 302      ( 2021 18:06 )             37.2                    133<L>  |  97  |  33<H>  ----------------------------<  154<H>  3.6   |  27  |  1.87<H>    Ca    8.6      2021 18:06  Mg     2.7         TPro  7.7  /  Alb  4.4  /  TBili  0.5  /  DBili  x   /  AST  24  /  ALT  29  /  AlkPhos  87      CK -397    Urinalysis Basic - ( 2021 19:57 )    Color: Yellow / Appearance: Clear / S.020 / pH: x  Gluc: x / Ketone: Trace  / Bili: Small / Urobili: 1   Blood: x / Protein: 30 mg/dL / Nitrite: Negative   Leuk Esterase: Trace / RBC: 5-10 /HPF / WBC 6-10 /HPF   Sq Epi: x / Non Sq Epi: x / Bacteria: Few /HPF    ct HEAD - unremarkable for acute changes    CXR - no active disease    EKG - NSR    Impression  - Syncope   1) Vasovagal, orthostatic with hypotension related to BP med use  2) Reduced oral intake and hypovolemia might have contributed especially since pt was on a diuretic until recently  - Resolved with IVF challenge; will continue isotonic IVF infusion normal saline @ 100cc/hour  - Admit and observe on telemetry overnight    - MINI - likely pre-renal from above  Continue IVF hydration as above  Repeat chemistry in AM    - Hypertension   Pt with elevated BP at the beginning of the week and now hypotensive episode with change in meds.  Pt was compliant on his old regimen and had no issues until recently.   Will observe off BP meds overnight and initiate BP meds gradually in AM starting with beta-blocker. Regular check of BP at home and PCP follow up within a week on discharge.

## 2021-01-27 NOTE — H&P ADULT - PROBLEM SELECTOR PLAN 4
Will hold off on home meds   observe off BP meds overnight and initiate BP meds gradually in AM starting with beta-blocker.  close monitoring of jolly, avoid ACEi and have pt follow up with pcp after discharge to resume medications

## 2021-01-27 NOTE — ED ADULT TRIAGE NOTE - CHIEF COMPLAINT QUOTE
pt BIBEMS c/o of syncope episode x1 today, per EMS Bp 82/58, pt reports "first time taking amlodipine today"

## 2021-01-27 NOTE — H&P ADULT - NSHPPHYSICALEXAM_GEN_ALL_CORE
Vital Signs Last 24 Hrs  T(C): 36.2 (27 Jan 2021 20:37), Max: 36.2 (27 Jan 2021 20:37)  T(F): 97.1 (27 Jan 2021 20:37), Max: 97.1 (27 Jan 2021 20:37)  HR: 68 (27 Jan 2021 20:37) (63 - 68)  BP: 140/81 (27 Jan 2021 20:37) (117/73 - 151/88)  BP(mean): --  RR: 17 (27 Jan 2021 20:37) (17 - 20)  SpO2: 97% (27 Jan 2021 20:37) (96% - 97%)    GENERAL: NAD, lying in bed comfortably  HEAD:  Atraumatic, Normocephalic  EYES: EOMI, PERRLA, conjunctiva and sclera clear  ENT: Moist mucous membranes  NECK: Supple, No JVD  CHEST/LUNG: Clear to auscultation bilaterally; No rales, rhonchi, wheezing, or rubs.  HEART: Regular rate and rhythm; S1+ S2+  ABDOMEN: Bowel sounds present; Soft, Nontender, mildly distended.   EXTREMITIES:  2+ Peripheral Pulses, brisk capillary refill. No clubbing, cyanosis, or edema  NERVOUS SYSTEM:  Alert & Oriented X3, speech clear. No deficits   MSK: FROM all 4 extremities, full and equal strength  SKIN: No rashes or lesions

## 2021-01-27 NOTE — H&P ADULT - PROBLEM SELECTOR PLAN 1
Pt presented with acute witnessed syncope   CTH negative  EKG with no significant changes   trops neg x1 fu t2 and t3   likely vasovagal vs hypovolemia 2/2 bp medications   pt was recently on HCTZ and has been having poor oral intake   fu Orthostatic bp   will hold off on BP medication  will monitor on tele for any arrhythmia   will hold off on neuro and cardio consult for now

## 2021-01-27 NOTE — H&P ADULT - PROBLEM SELECTOR PLAN 5
IMPROVE VTE Individual Risk Assessment  RISK                                                         Points  [  ] Previous VTE                                      3  [  ] Thrombophilia                                   2  [  ] Lower limb paralysis                         2 (unable to hold up >15 seconds)    [  ] Current Cancer                                  2       (within 6 months)  [  ] Immobilization > 24 hrs                    1  [  ] ICU/CCU stay > 24 hrs                         1  [x  ] Age > 60                                              1  cw heparin for dvt ppx

## 2021-01-27 NOTE — H&P ADULT - HISTORY OF PRESENT ILLNESS
64 yo male with pmh of anxiety, CAD (s/p stents in march 2020), HTN, hld presented to ED after a brief witnessed syncopal episode. Pt has been in ED multiple times for uncontrolled HTN and was recently started on new medication from PCP today amlodipine/benzepril 10-20 mg pill. Pt states he walked to the pharmacy this morning and took the pill today, later in the kitchen he was noticed to be pale, diaphoretic. Pts roommate checked and his bp was 90/40. He then had an episode of loose stool followed by briefly fainting on the kitchen table. Pt endorses when he woke up he was awake and alert and recollected the events fast. Denies tongue biting or UI.   Pt states he has not been eating or drinking well for the last few days bc of uncontrolled htn

## 2021-01-27 NOTE — H&P ADULT - PROBLEM SELECTOR PLAN 2
Pt noted to have Will with Cr 1.87  likely in the setting of acute decrease in BP   pt already started on IVF   will cw gentle hydration   hold ACEi  fu am bmp

## 2021-01-28 ENCOUNTER — TRANSCRIPTION ENCOUNTER (OUTPATIENT)
Age: 66
End: 2021-01-28

## 2021-01-28 DIAGNOSIS — N17.9 ACUTE KIDNEY FAILURE, UNSPECIFIED: ICD-10-CM

## 2021-01-28 DIAGNOSIS — Z29.9 ENCOUNTER FOR PROPHYLACTIC MEASURES, UNSPECIFIED: ICD-10-CM

## 2021-01-28 DIAGNOSIS — R55 SYNCOPE AND COLLAPSE: ICD-10-CM

## 2021-01-28 DIAGNOSIS — I25.10 ATHEROSCLEROTIC HEART DISEASE OF NATIVE CORONARY ARTERY WITHOUT ANGINA PECTORIS: ICD-10-CM

## 2021-01-28 DIAGNOSIS — I10 ESSENTIAL (PRIMARY) HYPERTENSION: ICD-10-CM

## 2021-01-28 LAB
ANION GAP SERPL CALC-SCNC: 9 MMOL/L — SIGNIFICANT CHANGE UP (ref 5–17)
BUN SERPL-MCNC: 26 MG/DL — HIGH (ref 7–18)
CALCIUM SERPL-MCNC: 8.6 MG/DL — SIGNIFICANT CHANGE UP (ref 8.4–10.5)
CHLORIDE SERPL-SCNC: 102 MMOL/L — SIGNIFICANT CHANGE UP (ref 96–108)
CHOLEST SERPL-MCNC: 156 MG/DL — SIGNIFICANT CHANGE UP
CK MB BLD-MCNC: 0.5 % — SIGNIFICANT CHANGE UP (ref 0–3.5)
CK MB CFR SERPL CALC: 2 NG/ML — SIGNIFICANT CHANGE UP (ref 0–3.6)
CK SERPL-CCNC: 338 U/L — HIGH (ref 35–232)
CK SERPL-CCNC: 407 U/L — HIGH (ref 35–232)
CO2 SERPL-SCNC: 26 MMOL/L — SIGNIFICANT CHANGE UP (ref 22–31)
CREAT SERPL-MCNC: 1.14 MG/DL — SIGNIFICANT CHANGE UP (ref 0.5–1.3)
CULTURE RESULTS: SIGNIFICANT CHANGE UP
FERRITIN SERPL-MCNC: 235 NG/ML — SIGNIFICANT CHANGE UP (ref 30–400)
GLUCOSE SERPL-MCNC: 108 MG/DL — HIGH (ref 70–99)
HCT VFR BLD CALC: 36.7 % — LOW (ref 39–50)
HDLC SERPL-MCNC: 43 MG/DL — SIGNIFICANT CHANGE UP
HGB BLD-MCNC: 12.5 G/DL — LOW (ref 13–17)
LIPID PNL WITH DIRECT LDL SERPL: 84 MG/DL — SIGNIFICANT CHANGE UP
MAGNESIUM SERPL-MCNC: 2.9 MG/DL — HIGH (ref 1.6–2.6)
MCHC RBC-ENTMCNC: 29.8 PG — SIGNIFICANT CHANGE UP (ref 27–34)
MCHC RBC-ENTMCNC: 34.1 GM/DL — SIGNIFICANT CHANGE UP (ref 32–36)
MCV RBC AUTO: 87.6 FL — SIGNIFICANT CHANGE UP (ref 80–100)
NON HDL CHOLESTEROL: 113 MG/DL — SIGNIFICANT CHANGE UP
NRBC # BLD: 0 /100 WBCS — SIGNIFICANT CHANGE UP (ref 0–0)
PHOSPHATE SERPL-MCNC: 3.6 MG/DL — SIGNIFICANT CHANGE UP (ref 2.5–4.5)
PLATELET # BLD AUTO: 286 K/UL — SIGNIFICANT CHANGE UP (ref 150–400)
POTASSIUM SERPL-MCNC: 3.4 MMOL/L — LOW (ref 3.5–5.3)
POTASSIUM SERPL-SCNC: 3.4 MMOL/L — LOW (ref 3.5–5.3)
RBC # BLD: 4.19 M/UL — LOW (ref 4.2–5.8)
RBC # FLD: 13.1 % — SIGNIFICANT CHANGE UP (ref 10.3–14.5)
SARS-COV-2 IGG SERPL QL IA: POSITIVE
SARS-COV-2 IGM SERPL IA-ACNC: 58.5 INDEX — HIGH
SODIUM SERPL-SCNC: 137 MMOL/L — SIGNIFICANT CHANGE UP (ref 135–145)
SPECIMEN SOURCE: SIGNIFICANT CHANGE UP
TRIGL SERPL-MCNC: 145 MG/DL — SIGNIFICANT CHANGE UP
TROPONIN I SERPL-MCNC: <0.015 NG/ML — SIGNIFICANT CHANGE UP (ref 0–0.04)
TROPONIN I SERPL-MCNC: <0.015 NG/ML — SIGNIFICANT CHANGE UP (ref 0–0.04)
TSH SERPL-MCNC: 0.43 UU/ML — SIGNIFICANT CHANGE UP (ref 0.34–4.82)
VIT B12 SERPL-MCNC: 225 PG/ML — LOW (ref 232–1245)
WBC # BLD: 7.35 K/UL — SIGNIFICANT CHANGE UP (ref 3.8–10.5)
WBC # FLD AUTO: 7.35 K/UL — SIGNIFICANT CHANGE UP (ref 3.8–10.5)

## 2021-01-28 PROCEDURE — 99232 SBSQ HOSP IP/OBS MODERATE 35: CPT | Mod: GC

## 2021-01-28 PROCEDURE — 93306 TTE W/DOPPLER COMPLETE: CPT | Mod: 26

## 2021-01-28 RX ORDER — ASPIRIN/CALCIUM CARB/MAGNESIUM 324 MG
81 TABLET ORAL DAILY
Refills: 0 | Status: DISCONTINUED | OUTPATIENT
Start: 2021-01-28 | End: 2021-01-30

## 2021-01-28 RX ORDER — PANTOPRAZOLE SODIUM 20 MG/1
40 TABLET, DELAYED RELEASE ORAL
Refills: 0 | Status: DISCONTINUED | OUTPATIENT
Start: 2021-01-28 | End: 2021-01-30

## 2021-01-28 RX ORDER — PREGABALIN 225 MG/1
1000 CAPSULE ORAL DAILY
Refills: 0 | Status: DISCONTINUED | OUTPATIENT
Start: 2021-01-28 | End: 2021-01-30

## 2021-01-28 RX ORDER — SERTRALINE 25 MG/1
150 TABLET, FILM COATED ORAL DAILY
Refills: 0 | Status: DISCONTINUED | OUTPATIENT
Start: 2021-01-28 | End: 2021-01-30

## 2021-01-28 RX ORDER — POTASSIUM CHLORIDE 20 MEQ
40 PACKET (EA) ORAL ONCE
Refills: 0 | Status: COMPLETED | OUTPATIENT
Start: 2021-01-28 | End: 2021-01-28

## 2021-01-28 RX ORDER — AMLODIPINE BESYLATE 2.5 MG/1
10 TABLET ORAL DAILY
Refills: 0 | Status: DISCONTINUED | OUTPATIENT
Start: 2021-01-28 | End: 2021-01-30

## 2021-01-28 RX ORDER — CLOPIDOGREL BISULFATE 75 MG/1
75 TABLET, FILM COATED ORAL DAILY
Refills: 0 | Status: DISCONTINUED | OUTPATIENT
Start: 2021-01-28 | End: 2021-01-30

## 2021-01-28 RX ORDER — GEMFIBROZIL 600 MG
600 TABLET ORAL
Refills: 0 | Status: DISCONTINUED | OUTPATIENT
Start: 2021-01-28 | End: 2021-01-30

## 2021-01-28 RX ORDER — LISINOPRIL/HYDROCHLOROTHIAZIDE 10-12.5 MG
1 TABLET ORAL
Qty: 0 | Refills: 0 | DISCHARGE

## 2021-01-28 RX ORDER — LABETALOL HCL 100 MG
100 TABLET ORAL
Refills: 0 | Status: DISCONTINUED | OUTPATIENT
Start: 2021-01-28 | End: 2021-01-28

## 2021-01-28 RX ORDER — ATORVASTATIN CALCIUM 80 MG/1
40 TABLET, FILM COATED ORAL AT BEDTIME
Refills: 0 | Status: DISCONTINUED | OUTPATIENT
Start: 2021-01-28 | End: 2021-01-30

## 2021-01-28 RX ADMIN — Medication 600 MILLIGRAM(S): at 17:43

## 2021-01-28 RX ADMIN — HEPARIN SODIUM 5000 UNIT(S): 5000 INJECTION INTRAVENOUS; SUBCUTANEOUS at 05:44

## 2021-01-28 RX ADMIN — Medication 40 MILLIEQUIVALENT(S): at 11:57

## 2021-01-28 RX ADMIN — SODIUM CHLORIDE 100 MILLILITER(S): 9 INJECTION INTRAMUSCULAR; INTRAVENOUS; SUBCUTANEOUS at 10:20

## 2021-01-28 RX ADMIN — Medication 81 MILLIGRAM(S): at 11:57

## 2021-01-28 RX ADMIN — HEPARIN SODIUM 5000 UNIT(S): 5000 INJECTION INTRAVENOUS; SUBCUTANEOUS at 15:25

## 2021-01-28 RX ADMIN — CLOPIDOGREL BISULFATE 75 MILLIGRAM(S): 75 TABLET, FILM COATED ORAL at 11:57

## 2021-01-28 RX ADMIN — ATORVASTATIN CALCIUM 40 MILLIGRAM(S): 80 TABLET, FILM COATED ORAL at 21:23

## 2021-01-28 RX ADMIN — PANTOPRAZOLE SODIUM 40 MILLIGRAM(S): 20 TABLET, DELAYED RELEASE ORAL at 05:44

## 2021-01-28 RX ADMIN — HEPARIN SODIUM 5000 UNIT(S): 5000 INJECTION INTRAVENOUS; SUBCUTANEOUS at 21:23

## 2021-01-28 RX ADMIN — AMLODIPINE BESYLATE 10 MILLIGRAM(S): 2.5 TABLET ORAL at 23:57

## 2021-01-28 RX ADMIN — Medication 600 MILLIGRAM(S): at 05:44

## 2021-01-28 NOTE — PATIENT PROFILE ADULT - OVER THE PAST TWO WEEKS HAVE YOU FELT DOWN, DEPRESSED OR HOPELESS?
Returned call to patient per request. Results of genetic testing conveyed. Patient verbalizes understanding and agrees with plan of care. Family variant testing not available d/t results not positive. Patient requesting copy of results, mailed to patient home address. No further questions at this time.    no

## 2021-01-28 NOTE — DISCHARGE NOTE PROVIDER - NSDCPNSUBOBJ_GEN_ALL_CORE
66 yo male with pmh of anxiety, CAD (s/p stents in march 2020), HTN, hld presented to ED after a brief witnessed syncopal episode. Pt was seen and examined, denied any new complaints. He did not go yesterday as his BP was high.     Medicine Progress Note    Patient is a 65y old  Male who presents with a chief complaint of syncope (29 Jan 2021 16:21)      SUBJECTIVE / OVERNIGHT EVENTS:    ADDITIONAL REVIEW OF SYSTEMS:    MEDICATIONS  (STANDING):  amLODIPine   Tablet 10 milliGRAM(s) Oral daily  aspirin enteric coated 81 milliGRAM(s) Oral daily  atorvastatin 40 milliGRAM(s) Oral at bedtime  clopidogrel Tablet 75 milliGRAM(s) Oral daily  cyanocobalamin 1000 MICROGram(s) Oral daily  gemfibrozil 600 milliGRAM(s) Oral two times a day  heparin   Injectable 5000 Unit(s) SubCutaneous every 8 hours  labetalol 100 milliGRAM(s) Oral two times a day  pantoprazole    Tablet 40 milliGRAM(s) Oral before breakfast  sertraline 150 milliGRAM(s) Oral daily    MEDICATIONS  (PRN):    CAPILLARY BLOOD GLUCOSE        I&O's Summary    29 Jan 2021 07:01  -  30 Jan 2021 07:00  --------------------------------------------------------  IN: 735 mL / OUT: 350 mL / NET: 385 mL        PHYSICAL EXAM:  Vital Signs Last 24 Hrs  T(C): 36.7 (30 Jan 2021 11:51), Max: 36.9 (29 Jan 2021 19:55)  T(F): 98 (30 Jan 2021 11:51), Max: 98.5 (29 Jan 2021 19:55)  HR: 71 (30 Jan 2021 11:51) (70 - 91)  BP: 145/76 (30 Jan 2021 11:51) (131/82 - 189/120)  BP(mean): --  RR: 18 (30 Jan 2021 11:51) (18 - 20)  SpO2: 97% (30 Jan 2021 11:51) (96% - 99%)  CONSTITUTIONAL: NAD, well-developed, well-groomed  ENMT: Moist oral mucosa, no pharyngeal injection or exudates; normal dentition  RESPIRATORY: Normal respiratory effort; lungs are clear to auscultation bilaterally  CARDIOVASCULAR: Regular rate and rhythm, normal S1 and S2, no murmur/rub/gallop; No lower extremity edema; Peripheral pulses are 2+ bilaterally  ABDOMEN: Nontender to palpation, normoactive bowel sounds, no rebound/guarding; No hepatosplenomegaly  PSYCH: A+O to person, place, and time; affect appropriate  NEUROLOGY: CN 2-12 are intact and symmetric; no gross sensory deficits   SKIN: No rashes; no palpable lesions    LABS:                    Culture - Urine (collected 28 Jan 2021 02:30)  Source: .Urine Clean Catch (Midstream)  Final Report (28 Jan 2021 22:22):    <10,000 CFU/mL Normal Urogenital Mell      COVID-19 PCR: NotDetec (27 Jan 2021 19:55)  COVID-19 PCR: NotDetec (26 Jan 2021 12:30)  COVID-19 PCR: NotDetec (24 Jan 2021 18:58)      RADIOLOGY & ADDITIONAL TESTS:  Imaging from Last 24 Hours:    Electrocardiogram/QTc Interval:    COORDINATION OF CARE:  Care Discussed with Consultants/Other Providers:         Problem/Plan - 1:  ·  Problem: Syncope.  Plan: -Pt presented with acute witnessed syncope   -CTH negative  -EKG with no significant changes   -trops neg x3 neg  -likely vasovagal vs hypotension 2/2 bp medications   -pt was recently on HCTZ and has been having poor oral intake   -Orthostatic bp positive   -Resumed on norvasc 10mg PO daily  -increased lisinopril to 10mg PO daily  -Stopped labetalol, will be generous with BP management for now as he had syncope with strict BP control and his meds should be slowly uptitrated over several weeks as outpatient     Problem/Plan - 2:  ·  Problem: MINI (acute kidney injury).  Plan: Resolved after IV fluids  -Hypokalemia was supplemented     Problem/Plan - 3:  ·  Problem: CAD (coronary artery disease).  Plan: -cw asa, Plavix        Problem/Plan - 4:  ·  Problem: Hypertension.  Plan: -Lisinopril and amlodipine.         Patient is ready for discharge  He was advised to get up slowly from a sitting position, wear compression stockings for postural hypotension and follow up with PCP within 1 week of discharge  I spent 40 minutes preparing and coordinating this discharge

## 2021-01-28 NOTE — PROGRESS NOTE ADULT - ATTENDING COMMENTS
feeling better  TTE today  if remain stable and no gross abnormality on TTE then can be dced home today

## 2021-01-28 NOTE — DISCHARGE NOTE PROVIDER - NSDCCPCAREPLAN_GEN_ALL_CORE_FT
PRINCIPAL DISCHARGE DIAGNOSIS  Diagnosis: Syncope  Assessment and Plan of Treatment: You had syncopal episode likely vasovagal  vs possibly secondary to  new BP meds. We held  your new BP meds.      SECONDARY DISCHARGE DIAGNOSES  Diagnosis: MINI (acute kidney injury)  Assessment and Plan of Treatment:      PRINCIPAL DISCHARGE DIAGNOSIS  Diagnosis: Syncope  Assessment and Plan of Treatment: You had syncopal episode likely vasovagal  vs possibly secondary to  new BP meds. We have made changes in you blood pressure medications. We have stopped labetalol. you recoommend you to continue on amlodipine and lisinopril. Echocardiogram was done which showed normal ejection fraction with grade 1 diastolic dysfunction. Please follow up your primary care provider within 1 week.      SECONDARY DISCHARGE DIAGNOSES  Diagnosis: Hypertension  Assessment and Plan of Treatment: Please continue amlodipine and lisinopril as above. We have held labetalol for now.  You should consume low salt diet like fruits and vegetables. You should monitor your blood pressure three times a day  and follow up with primary care provider within 1 week.    Diagnosis: MINI (acute kidney injury)  Assessment and Plan of Treatment: Your creatinine level was elevated on admission. It has resolved after IV fluid hydration. We recommend you to avoid nephrotoxic medications like ibuprofen.  Please follow up with your primary care provider and monitor BMP.     PRINCIPAL DISCHARGE DIAGNOSIS  Diagnosis: Syncope  Assessment and Plan of Treatment: You had syncopal episode likely vasovagal  vs possibly secondary to  new BP meds. We have made changes in you blood pressure medications. Continue to take medications labetalol 100mg twice daily, amlodipine 5mg and Lisinopril 5mg. Echocardiogram was done which showed normal ejection fraction with grade 1 diastolic dysfunction. Please follow up your primary care provider within 1 week.      SECONDARY DISCHARGE DIAGNOSES  Diagnosis: Hypertension  Assessment and Plan of Treatment: Please continue amlodipine and lisinopril as above.  Continue to take medications labetalol 100mg twice daily. You should consume low salt diet like fruits and vegetables. You should monitor your blood pressure three times a day  and follow up with primary care provider within 1 week.    Diagnosis: MINI (acute kidney injury)  Assessment and Plan of Treatment: Your creatinine level was elevated on admission. It has resolved after IV fluid hydration. We recommend you to avoid nephrotoxic medications like ibuprofen.  Please follow up with your primary care provider and monitor BMP.     PRINCIPAL DISCHARGE DIAGNOSIS  Diagnosis: Syncope  Assessment and Plan of Treatment: You had syncopal episode likely vasovagal  vs possibly secondary to  new BP meds. We have made changes in you blood pressure medications.  Take amlodipine 10mg and Lisinopril 10mg. Labetalol was discontinued. Echocardiogram was done which showed normal ejection fraction with grade 1 diastolic dysfunction. Please follow up your primary care provider within 1 week.      SECONDARY DISCHARGE DIAGNOSES  Diagnosis: Hypertension  Assessment and Plan of Treatment: Please continue amlodipine 10mg and lisinopril 10mg as above.  Labetalol was discontinued. You should consume low salt diet like fruits and vegetables. You should monitor your blood pressure three times a day  and follow up with primary care provider within 1 week.    Diagnosis: MINI (acute kidney injury)  Assessment and Plan of Treatment: Your creatinine level was elevated on admission. It has resolved after IV fluid hydration. We recommend you to avoid nephrotoxic medications like ibuprofen.  Please follow up with your primary care provider and monitor BMP.

## 2021-01-28 NOTE — DISCHARGE NOTE PROVIDER - NSDCMRMEDTOKEN_GEN_ALL_CORE_FT
amlodipine-benazepril 10 mg-20 mg oral capsule: 1 cap(s) orally once a day  Aspir 81 oral delayed release tablet: 1 tab(s) orally once a day  atorvastatin 40 mg oral tablet: 1 tab(s) orally once a day (at bedtime)   gemfibrozil 600 mg oral tablet: 1 tab(s) orally 2 times a day  labetalol 100 mg oral tablet: 1 tab(s) orally 2 times a day  LORazepam 1 mg oral tablet: 1 tab(s) orally once a day (at bedtime), As Needed  Omega-3 oral capsule: 1 cap(s) orally once a day  pantoprazole 40 mg oral delayed release tablet: 1 tab(s) orally once a day (before a meal)  Plavix 75 mg oral tablet: 1 tab(s) orally once a day   sertraline 100 mg oral tablet: 1.5 tab(s) orally once a day  testosterone 30 mg/actuation transdermal solution: 1 pump(s) transdermal once a day to each axilla   amLODIPine 10 mg oral tablet: 1 tab(s) orally once a day  Aspir 81 oral delayed release tablet: 1 tab(s) orally once a day  atorvastatin 40 mg oral tablet: 1 tab(s) orally once a day (at bedtime)   cyanocobalamin 1000 mcg oral tablet: 1 tab(s) orally once a day  gemfibrozil 600 mg oral tablet: 1 tab(s) orally 2 times a day  lisinopril 5 mg oral tablet: 1 tab(s) orally once a day  LORazepam 1 mg oral tablet: 1 tab(s) orally once a day (at bedtime), As Needed  Omega-3 oral capsule: 1 cap(s) orally once a day  pantoprazole 40 mg oral delayed release tablet: 1 tab(s) orally once a day (before a meal)  Plavix 75 mg oral tablet: 1 tab(s) orally once a day   sertraline 100 mg oral tablet: 1.5 tab(s) orally once a day   amLODIPine 10 mg oral tablet: 1 tab(s) orally once a day  Aspir 81 oral delayed release tablet: 1 tab(s) orally once a day  atorvastatin 40 mg oral tablet: 1 tab(s) orally once a day (at bedtime)   cyanocobalamin 1000 mcg oral tablet: 1 tab(s) orally once a day  gemfibrozil 600 mg oral tablet: 1 tab(s) orally 2 times a day  labetalol 100 mg oral tablet: 1 tab(s) orally 2 times a day   lisinopril 5 mg oral tablet: 1 tab(s) orally once a day  LORazepam 1 mg oral tablet: 1 tab(s) orally once a day (at bedtime), As Needed  Omega-3 oral capsule: 1 cap(s) orally once a day  pantoprazole 40 mg oral delayed release tablet: 1 tab(s) orally once a day (before a meal)  Plavix 75 mg oral tablet: 1 tab(s) orally once a day   sertraline 100 mg oral tablet: 1.5 tab(s) orally once a day   amLODIPine 10 mg oral tablet: 1 tab(s) orally once a day  Aspir 81 oral delayed release tablet: 1 tab(s) orally once a day  atorvastatin 40 mg oral tablet: 1 tab(s) orally once a day (at bedtime)   cyanocobalamin 1000 mcg oral tablet: 1 tab(s) orally once a day  gemfibrozil 600 mg oral tablet: 1 tab(s) orally 2 times a day  lisinopril 10 mg oral tablet: 1 tab(s) orally once a day   LORazepam 1 mg oral tablet: 1 tab(s) orally once a day (at bedtime), As Needed  Omega-3 oral capsule: 1 cap(s) orally once a day  pantoprazole 40 mg oral delayed release tablet: 1 tab(s) orally once a day (before a meal)  Plavix 75 mg oral tablet: 1 tab(s) orally once a day   sertraline 100 mg oral tablet: 1.5 tab(s) orally once a day

## 2021-01-28 NOTE — DISCHARGE NOTE PROVIDER - CARE PROVIDER_API CALL
YELENA HARRIS  Internal Medicine  [address]  [place] [address]  Phone: [phone]  Fax: [phone]  Follow Up Time:

## 2021-01-28 NOTE — DISCHARGE NOTE PROVIDER - HOSPITAL COURSE
66 yo male with pmh of anxiety, CAD (s/p stents in march 2020), HTN, hld presented to ED after a brief witnessed syncopal episode.   Pt states he walked to the pharmacy this morning and took the pill today, later in the kitchen he was noticed to be pale, diaphoretic. Pts roommate checked and his bp was 90/40. He then had an episode of loose stool followed by briefly fainting on the kitchen table. Pt endorsed when he woke up he was awake and alert and recollected the events fast. Denies tongue biting or UI.   Pt states he has not been eating or drinking well for the last few days bc of uncontrolled htn   Pt was admitted for syncopal work up. Pt likely had vasovagal syncope  vs orthostatic hypotension. Pt was given iv fluids. Echocardiogram was done which showed 64 yo male with pmh of anxiety, CAD (s/p stents in march 2020), HTN, hld presented to ED after a brief witnessed syncopal episode.   Pt states he walked to the pharmacy this morning and took the pill today, later in the kitchen he was noticed to be pale, diaphoretic. Pts roommate checked and his bp was 90/40. He then had an episode of loose stool followed by briefly fainting on the kitchen table. Pt endorsed when he woke up he was awake and alert and recollected the events fast. Denies tongue biting or UI.   Pt states he has not been eating or drinking well for the last few days bc of uncontrolled htn   Pt was admitted for syncopal work up. Pt likely had vasovagal syncope  vs orthostatic hypotension. Pt was given iv fluids. Echocardiogram was done which showed normal ejection fraction with grade 1 diastolic dysfunction. Pt's blood pressure medications were adjusted. Labetalol has been discontinued. Amlodipine and lisinopril has been continued. Pt has been explained well about the changes. pt was recommended to monitor blood pressure at home and follow up with primary care provider within 1 week.  pt is stable for discharge. Case has been discussed with the attending. This is just a summary of the case. For further information please refer to pt. chart document. 64 yo male with pmh of anxiety, CAD (s/p stents in march 2020), HTN, hld presented to ED after a brief witnessed syncopal episode.   Pt states he walked to the pharmacy this morning and took the pill today, later in the kitchen he was noticed to be pale, diaphoretic. Pts roommate checked and his bp was 90/40. He then had an episode of loose stool followed by briefly fainting on the kitchen table. Pt endorsed when he woke up he was awake and alert and recollected the events fast. Denies tongue biting or UI.   Pt states he has not been eating or drinking well for the last few days bc of uncontrolled htn   Pt was admitted for syncopal work up. Pt likely had vasovagal syncope  vs orthostatic hypotension. Pt was given iv fluids. Echocardiogram was done which showed normal ejection fraction with grade 1 diastolic dysfunction. Pt's blood pressure medications were adjusted. Amlodipine and lisinopril has been continued as well as labetalol. Pt has been explained well about the changes. pt was recommended to monitor blood pressure at home and follow up with primary care provider within 1 week.  pt is stable for discharge. Case has been discussed with the attending. This is just a summary of the case. For further information please refer to pt. chart document. 66 yo male with pmh of anxiety, CAD (s/p stents in march 2020), HTN, hld presented to ED after a brief witnessed syncopal episode.   Pt states he walked to the pharmacy this morning and took the pill today, later in the kitchen he was noticed to be pale, diaphoretic. Pts roommate checked and his bp was 90/40. He then had an episode of loose stool followed by briefly fainting on the kitchen table. Pt endorsed when he woke up he was awake and alert and recollected the events fast. Denies tongue biting or UI.   Pt states he has not been eating or drinking well for the last few days bc of uncontrolled htn   Pt was admitted for syncopal work up. Pt likely had vasovagal syncope  vs orthostatic hypotension. Pt was given iv fluids. Echocardiogram was done which showed normal ejection fraction with grade 1 diastolic dysfunction. Pt's blood pressure medications were adjusted. Labetalol was discontinued . Amlodipine 10mg  and lisinopril 10mg have been continued. Pt has been explained well about the changes. pt was recommended to monitor blood pressure at home and follow up with primary care provider within 1 week.  pt is stable for discharge. Case has been discussed with the attending. This is just a summary of the case. For further information please refer to pt. chart document.

## 2021-01-29 PROCEDURE — 99239 HOSP IP/OBS DSCHRG MGMT >30: CPT | Mod: GC

## 2021-01-29 RX ORDER — ACETAMINOPHEN 500 MG
650 TABLET ORAL ONCE
Refills: 0 | Status: COMPLETED | OUTPATIENT
Start: 2021-01-29 | End: 2021-01-29

## 2021-01-29 RX ORDER — LABETALOL HCL 100 MG
10 TABLET ORAL ONCE
Refills: 0 | Status: COMPLETED | OUTPATIENT
Start: 2021-01-29 | End: 2021-01-29

## 2021-01-29 RX ORDER — LANOLIN ALCOHOL/MO/W.PET/CERES
3 CREAM (GRAM) TOPICAL ONCE
Refills: 0 | Status: COMPLETED | OUTPATIENT
Start: 2021-01-29 | End: 2021-01-29

## 2021-01-29 RX ORDER — LISINOPRIL 2.5 MG/1
1 TABLET ORAL
Qty: 20 | Refills: 0
Start: 2021-01-29 | End: 2021-02-17

## 2021-01-29 RX ORDER — LABETALOL HCL 100 MG
100 TABLET ORAL
Refills: 0 | Status: DISCONTINUED | OUTPATIENT
Start: 2021-01-29 | End: 2021-01-29

## 2021-01-29 RX ORDER — AMLODIPINE BESYLATE 2.5 MG/1
1 TABLET ORAL
Qty: 20 | Refills: 0
Start: 2021-01-29 | End: 2021-02-17

## 2021-01-29 RX ORDER — PREGABALIN 225 MG/1
1 CAPSULE ORAL
Qty: 20 | Refills: 0
Start: 2021-01-29 | End: 2021-02-17

## 2021-01-29 RX ORDER — LISINOPRIL 2.5 MG/1
10 TABLET ORAL DAILY
Refills: 0 | Status: DISCONTINUED | OUTPATIENT
Start: 2021-01-29 | End: 2021-01-29

## 2021-01-29 RX ORDER — AMLODIPINE BESYLATE AND BENAZEPRIL HYDROCHLORIDE 10; 20 MG/1; MG/1
1 CAPSULE ORAL
Qty: 0 | Refills: 0 | DISCHARGE

## 2021-01-29 RX ORDER — LISINOPRIL 2.5 MG/1
5 TABLET ORAL DAILY
Refills: 0 | Status: DISCONTINUED | OUTPATIENT
Start: 2021-01-29 | End: 2021-01-30

## 2021-01-29 RX ORDER — LABETALOL HCL 100 MG
1 TABLET ORAL
Qty: 0 | Refills: 0 | DISCHARGE

## 2021-01-29 RX ADMIN — Medication 10 MILLIGRAM(S): at 01:19

## 2021-01-29 RX ADMIN — Medication 600 MILLIGRAM(S): at 05:30

## 2021-01-29 RX ADMIN — Medication 3 MILLIGRAM(S): at 01:36

## 2021-01-29 RX ADMIN — SERTRALINE 150 MILLIGRAM(S): 25 TABLET, FILM COATED ORAL at 11:05

## 2021-01-29 RX ADMIN — Medication 650 MILLIGRAM(S): at 21:48

## 2021-01-29 RX ADMIN — Medication 81 MILLIGRAM(S): at 11:05

## 2021-01-29 RX ADMIN — AMLODIPINE BESYLATE 10 MILLIGRAM(S): 2.5 TABLET ORAL at 05:30

## 2021-01-29 RX ADMIN — PREGABALIN 1000 MICROGRAM(S): 225 CAPSULE ORAL at 11:05

## 2021-01-29 RX ADMIN — Medication 600 MILLIGRAM(S): at 19:02

## 2021-01-29 RX ADMIN — Medication 10 MILLIGRAM(S): at 22:05

## 2021-01-29 RX ADMIN — Medication 650 MILLIGRAM(S): at 01:36

## 2021-01-29 RX ADMIN — CLOPIDOGREL BISULFATE 75 MILLIGRAM(S): 75 TABLET, FILM COATED ORAL at 11:05

## 2021-01-29 RX ADMIN — HEPARIN SODIUM 5000 UNIT(S): 5000 INJECTION INTRAVENOUS; SUBCUTANEOUS at 20:41

## 2021-01-29 RX ADMIN — ATORVASTATIN CALCIUM 40 MILLIGRAM(S): 80 TABLET, FILM COATED ORAL at 20:41

## 2021-01-29 RX ADMIN — PANTOPRAZOLE SODIUM 40 MILLIGRAM(S): 20 TABLET, DELAYED RELEASE ORAL at 05:30

## 2021-01-29 RX ADMIN — Medication 3 MILLIGRAM(S): at 21:49

## 2021-01-29 RX ADMIN — HEPARIN SODIUM 5000 UNIT(S): 5000 INJECTION INTRAVENOUS; SUBCUTANEOUS at 13:11

## 2021-01-29 RX ADMIN — HEPARIN SODIUM 5000 UNIT(S): 5000 INJECTION INTRAVENOUS; SUBCUTANEOUS at 05:30

## 2021-01-29 RX ADMIN — LISINOPRIL 5 MILLIGRAM(S): 2.5 TABLET ORAL at 13:11

## 2021-01-29 RX ADMIN — Medication 650 MILLIGRAM(S): at 11:47

## 2021-01-29 NOTE — PROGRESS NOTE ADULT - PROBLEM SELECTOR PLAN 3
-cw asa, Plavix   -will hold on ACEi in the setting of jolly   -Resuming labetalol 100mg BID
-cw asa, Plavix   -will hold on ACEi in the setting of jolly

## 2021-01-29 NOTE — PROGRESS NOTE ADULT - PROBLEM SELECTOR PLAN 1
-Pt presented with acute witnessed syncope   -CTH negative  -EKG with no significant changes   -trops neg x3 neg  -likely vasovagal vs hypovolemia 2/2 bp medications   -pt was recently on HCTZ and has been having poor oral intake   -Orthostatic bp positive   -will continue to hold his new med   -restarting on labetalol 100mg BID   -will monitor on tele for any arrhythmia   -will hold off on neuro and cardio consult for now  -Will repeat ECHO today if it would be fine , will D/C patient
-Pt presented with acute witnessed syncope   -CTH negative  -EKG with no significant changes   -trops neg x3 neg  -likely vasovagal vs hypovolemia 2/2 bp medications   -pt was recently on HCTZ and has been having poor oral intake   -Orthostatic bp positive   -will continue to hold his new med   -restarting on labetalol 100mg BID   -ECHO showed G1DD  -started on lisinopril and amlodipine

## 2021-01-29 NOTE — PROGRESS NOTE ADULT - PROBLEM SELECTOR PLAN 2
Resolved after IV fluids  -Potasium was 3.4 , replaced
Resolved after IV fluids  -Potasium was 3.4 , replaced

## 2021-01-29 NOTE — PROGRESS NOTE ADULT - ASSESSMENT
66 yo male with pmh of anxiety, CAD (s/p stents in march 2020), HTN, hld presented to ED after a brief witnessed syncopal episode. Pt is admitted for close monitoring  
66 yo male with pmh of anxiety, CAD (s/p stents in march 2020), HTN, hld presented to ED after a brief witnessed syncopal episode. Pt is admitted for close monitoring

## 2021-01-29 NOTE — PROGRESS NOTE ADULT - SUBJECTIVE AND OBJECTIVE BOX
PGY-1 Progress Note discussed with attending    PAGER #: [[phone]] TILL 5:00 PM  PLEASE CONTACT ON CALL TEAM:  - On Call Team (Please refer to Lavell) FROM 5:00 PM - 8:30PM  - Nightfloat Team FROM 8:30 -7:30 AM    CHIEF COMPLAINT & BRIEF HOSPITAL COURSE: 64 yo male with pmh of anxiety, CAD (s/p stents in march 2020), HTN, hld presented to ED after a brief witnessed syncopal episode.   Pt states he walked to the pharmacy this morning and took the pill today, later in the kitchen he was noticed to be pale, diaphoretic. Pts roommate checked and his bp was 90/40. He then had an episode of loose stool followed by briefly fainting on the kitchen table. Pt endorsed when he woke up he was awake and alert and recollected the events fast. Denies tongue biting or UI.   Pt states he has not been eating or drinking well for the last few days bc of uncontrolled htn   Pt was admitted for syncopal work up. Pt likely had vasovagal syncope  vs orthostatic hypotension. Pt was given iv fluids. CTH negative. Echocardiogram was done which showed      INTERVAL HPI/OVERNIGHT EVENTS: No event overnight. Pt examined at bedside this morning no complaints       REVIEW OF SYSTEMS:  CONSTITUTIONAL: No fever, weight loss, or fatigue  RESPIRATORY: No cough, wheezing, chills or hemoptysis; No shortness of breath  CARDIOVASCULAR: No chest pain, palpitations, dizziness, or leg swelling  GASTROINTESTINAL: No abdominal pain. No nausea, vomiting, or hematemesis; No diarrhea or constipation. No melena or hematochezia.  GENITOURINARY: No dysuria or hematuria, urinary frequency  NEUROLOGICAL: No headaches, memory loss, loss of strength, numbness, or tremors  SKIN: No itching, burning, rashes, or lesions     Vital Signs Last 24 Hrs  T(C): 36.4 (28 Jan 2021 07:54), Max: 36.7 (28 Jan 2021 01:19)  T(F): 97.6 (28 Jan 2021 07:54), Max: 98 (28 Jan 2021 01:19)  HR: 70 (28 Jan 2021 07:54) (62 - 70)  BP: 143/75 (28 Jan 2021 07:54) (117/73 - 156/73)  BP(mean): --  RR: 18 (28 Jan 2021 07:54) (17 - 20)  SpO2: 98% (28 Jan 2021 07:54) (96% - 100%)    PHYSICAL EXAMINATION:  GENERAL: NAD, well built  HEAD:  Atraumatic, Normocephalic  EYES:  conjunctiva and sclera clear  NECK: Supple, No JVD, Normal thyroid  CHEST/LUNG: Clear to auscultation. Clear to percussion bilaterally; No rales, rhonchi, wheezing, or rubs  HEART: Regular rate and rhythm; No murmurs, rubs, or gallops  ABDOMEN: Soft, Nontender, Nondistended; Bowel sounds present  NERVOUS SYSTEM:  Alert & Oriented X3,    EXTREMITIES:  2+ Peripheral Pulses, No clubbing, cyanosis, or edema  SKIN: warm dry    MEDICATIONS  (STANDING):  aspirin enteric coated 81 milliGRAM(s) Oral daily  atorvastatin 40 milliGRAM(s) Oral at bedtime  clopidogrel Tablet 75 milliGRAM(s) Oral daily  gemfibrozil 600 milliGRAM(s) Oral two times a day  heparin   Injectable 5000 Unit(s) SubCutaneous every 8 hours  labetalol 100 milliGRAM(s) Oral two times a day  pantoprazole    Tablet 40 milliGRAM(s) Oral before breakfast  sertraline 150 milliGRAM(s) Oral daily  sodium chloride 0.9%. 1000 milliLiter(s) (100 mL/Hr) IV Continuous <Continuous>    MEDICATIONS  (PRN):                          12.5   7.35  )-----------( 286      ( 28 Jan 2021 08:48 )             36.7     01-28    137  |  102  |  26<H>  ----------------------------<  108<H>  3.4<L>   |  26  |  1.14    Ca    8.6      28 Jan 2021 08:48  Phos  3.6     01-28  Mg     2.9     01-28    TPro  7.7  /  Alb  4.4  /  TBili  0.5  /  DBili  x   /  AST  24  /  ALT  29  /  AlkPhos  87  01-27    LIVER FUNCTIONS - ( 27 Jan 2021 18:06 )  Alb: 4.4 g/dL / Pro: 7.7 g/dL / ALK PHOS: 87 U/L / ALT: 29 U/L DA / AST: 24 U/L / GGT: x           CARDIAC MARKERS ( 28 Jan 2021 08:48 )  <0.015 ng/mL / x     / 338 U/L / x     / x      CARDIAC MARKERS ( 28 Jan 2021 01:14 )  <0.015 ng/mL / x     / 407 U/L / x     / 2.0 ng/mL  CARDIAC MARKERS ( 27 Jan 2021 18:06 )  <0.015 ng/mL / x     / 397 U/L / x     / x              CAPILLARY BLOOD GLUCOSE      RADIOLOGY & ADDITIONAL TESTS:    CTH neg  ECHO:                  
PGY-1 Progress Note discussed with attending    PAGER #: [[phone]] TILL 5:00 PM  PLEASE CONTACT ON CALL TEAM:  - On Call Team (Please refer to Lavell) FROM 5:00 PM - 8:30PM  - Nightfloat Team FROM 8:30 -7:30 AM    CHIEF COMPLAINT & BRIEF HOSPITAL COURSE: 64 yo male with pmh of anxiety, CAD (s/p stents in march 2020), HTN, hld presented to ED after a brief witnessed syncopal episode.   Pt states he walked to the pharmacy this morning and took the pill today, later in the kitchen he was noticed to be pale, diaphoretic. Pts roommate checked and his bp was 90/40. He then had an episode of loose stool followed by briefly fainting on the kitchen table. Pt endorsed when he woke up he was awake and alert and recollected the events fast. Denies tongue biting or UI.   Pt states he has not been eating or drinking well for the last few days bc of uncontrolled htn   Pt was admitted for syncopal work up. Pt likely had vasovagal syncope  vs orthostatic hypotension. Pt was given iv fluids. CTH negative. Echocardiogram was done which showed GIDD.     INTERVAL HPI/OVERNIGHT EVENTS: No active event overnight. Pt examined at bedside twice by myself and with the attending. Explained at length about BP medications and that medications take couple of doses before it actually starting working. BP is optimal and suboptimal. Pt is very much frustrated and concerned about his BP. He is for discharge today       REVIEW OF SYSTEMS:  CONSTITUTIONAL: No fever, weight loss, or fatigue  RESPIRATORY: No cough, wheezing, chills or hemoptysis; No shortness of breath  CARDIOVASCULAR: No chest pain, palpitations, dizziness, or leg swelling  GASTROINTESTINAL: No abdominal pain. No nausea, vomiting, or hematemesis; No diarrhea or constipation. No melena or hematochezia.  GENITOURINARY: No dysuria or hematuria, urinary frequency  NEUROLOGICAL: No headaches, memory loss, loss of strength, numbness, or tremors  SKIN: No itching, burning, rashes, or lesions     Vital Signs Last 24 Hrs  T(C): 36.3 (29 Jan 2021 15:59), Max: 36.8 (28 Jan 2021 23:57)  T(F): 97.4 (29 Jan 2021 15:59), Max: 98.2 (28 Jan 2021 23:57)  HR: 72 (29 Jan 2021 15:59) (67 - 81)  BP: 155/84 (29 Jan 2021 15:59) (136/77 - 182/90)  BP(mean): --  RR: 18 (29 Jan 2021 15:59) (18 - 19)  SpO2: 99% (29 Jan 2021 15:59) (96% - 99%)    PHYSICAL EXAMINATION:  GENERAL: NAD, well built  HEAD:  Atraumatic, Normocephalic  EYES:  conjunctiva and sclera clear  NECK: Supple, No JVD, Normal thyroid  CHEST/LUNG: Clear to auscultation. Clear to percussion bilaterally; No rales, rhonchi, wheezing, or rubs  HEART: Regular rate and rhythm; No murmurs, rubs, or gallops  ABDOMEN: Soft, Nontender, Nondistended; Bowel sounds present  NERVOUS SYSTEM:  Alert & Oriented X3,    EXTREMITIES:  2+ Peripheral Pulses, No clubbing, cyanosis, or edema  SKIN: warm dry    MEDICATIONS  (STANDING):  amLODIPine   Tablet 10 milliGRAM(s) Oral daily  aspirin enteric coated 81 milliGRAM(s) Oral daily  atorvastatin 40 milliGRAM(s) Oral at bedtime  clopidogrel Tablet 75 milliGRAM(s) Oral daily  cyanocobalamin 1000 MICROGram(s) Oral daily  gemfibrozil 600 milliGRAM(s) Oral two times a day  heparin   Injectable 5000 Unit(s) SubCutaneous every 8 hours  lisinopril 5 milliGRAM(s) Oral daily  pantoprazole    Tablet 40 milliGRAM(s) Oral before breakfast  sertraline 150 milliGRAM(s) Oral daily    MEDICATIONS  (PRN):                            12.5   7.35  )-----------( 286      ( 28 Jan 2021 08:48 )             36.7     01-28    137  |  102  |  26<H>  ----------------------------<  108<H>  3.4<L>   |  26  |  1.14    Ca    8.6      28 Jan 2021 08:48  Phos  3.6     01-28  Mg     2.9     01-28    TPro  7.7  /  Alb  4.4  /  TBili  0.5  /  DBili  x   /  AST  24  /  ALT  29  /  AlkPhos  87  01-27    LIVER FUNCTIONS - ( 27 Jan 2021 18:06 )  Alb: 4.4 g/dL / Pro: 7.7 g/dL / ALK PHOS: 87 U/L / ALT: 29 U/L DA / AST: 24 U/L / GGT: x           CARDIAC MARKERS ( 28 Jan 2021 08:48 )  <0.015 ng/mL / x     / 338 U/L / x     / x      CARDIAC MARKERS ( 28 Jan 2021 01:14 )  <0.015 ng/mL / x     / 407 U/L / x     / 2.0 ng/mL  CARDIAC MARKERS ( 27 Jan 2021 18:06 )  <0.015 ng/mL / x     / 397 U/L / x     / x              CAPILLARY BLOOD GLUCOSE      RADIOLOGY & ADDITIONAL TESTS:

## 2021-01-29 NOTE — PROGRESS NOTE ADULT - PROBLEM SELECTOR PLAN 5
IMPROVE VTE Individual Risk Assessment  RISK                                                         Points  [  ] Previous VTE                                      3  [  ] Thrombophilia                                   2  [  ] Lower limb paralysis                         2 (unable to hold up >15 seconds)    [  ] Current Cancer                                  2       (within 6 months)  [  ] Immobilization > 24 hrs                    1  [  ] ICU/CCU stay > 24 hrs                         1  [x  ] Age > 60                                              1  cw heparin for dvt ppx
IMPROVE VTE Individual Risk Assessment  RISK                                                         Points  [  ] Previous VTE                                      3  [  ] Thrombophilia                                   2  [  ] Lower limb paralysis                         2 (unable to hold up >15 seconds)    [  ] Current Cancer                                  2       (within 6 months)  [  ] Immobilization > 24 hrs                    1  [  ] ICU/CCU stay > 24 hrs                         1  [x  ] Age > 60                                              1  cw heparin for dvt ppx

## 2021-01-30 ENCOUNTER — EMERGENCY (EMERGENCY)
Facility: HOSPITAL | Age: 66
LOS: 1 days | Discharge: HOME | End: 2021-01-30
Attending: EMERGENCY MEDICINE
Payer: MEDICAID

## 2021-01-30 ENCOUNTER — TRANSCRIPTION ENCOUNTER (OUTPATIENT)
Age: 66
End: 2021-01-30

## 2021-01-30 VITALS
OXYGEN SATURATION: 97 % | HEART RATE: 71 BPM | TEMPERATURE: 98 F | RESPIRATION RATE: 18 BRPM | DIASTOLIC BLOOD PRESSURE: 76 MMHG | SYSTOLIC BLOOD PRESSURE: 145 MMHG

## 2021-01-30 VITALS
DIASTOLIC BLOOD PRESSURE: 88 MMHG | SYSTOLIC BLOOD PRESSURE: 159 MMHG | OXYGEN SATURATION: 98 % | RESPIRATION RATE: 20 BRPM | HEART RATE: 93 BPM

## 2021-01-30 VITALS
WEIGHT: 218.26 LBS | SYSTOLIC BLOOD PRESSURE: 175 MMHG | HEART RATE: 105 BPM | TEMPERATURE: 98 F | DIASTOLIC BLOOD PRESSURE: 96 MMHG | HEIGHT: 68 IN | OXYGEN SATURATION: 98 % | RESPIRATION RATE: 20 BRPM

## 2021-01-30 DIAGNOSIS — Z98.890 OTHER SPECIFIED POSTPROCEDURAL STATES: Chronic | ICD-10-CM

## 2021-01-30 DIAGNOSIS — Z96.642 PRESENCE OF LEFT ARTIFICIAL HIP JOINT: Chronic | ICD-10-CM

## 2021-01-30 DIAGNOSIS — K60.3 ANAL FISTULA: Chronic | ICD-10-CM

## 2021-01-30 PROCEDURE — 82533 TOTAL CORTISOL: CPT

## 2021-01-30 PROCEDURE — 82962 GLUCOSE BLOOD TEST: CPT

## 2021-01-30 PROCEDURE — 99284 EMERGENCY DEPT VISIT MOD MDM: CPT

## 2021-01-30 PROCEDURE — 83735 ASSAY OF MAGNESIUM: CPT

## 2021-01-30 PROCEDURE — 85027 COMPLETE CBC AUTOMATED: CPT

## 2021-01-30 PROCEDURE — 85025 COMPLETE CBC W/AUTO DIFF WBC: CPT

## 2021-01-30 PROCEDURE — 82550 ASSAY OF CK (CPK): CPT

## 2021-01-30 PROCEDURE — 99239 HOSP IP/OBS DSCHRG MGMT >30: CPT | Mod: GC

## 2021-01-30 PROCEDURE — 82728 ASSAY OF FERRITIN: CPT

## 2021-01-30 PROCEDURE — 80053 COMPREHEN METABOLIC PANEL: CPT

## 2021-01-30 PROCEDURE — 70450 CT HEAD/BRAIN W/O DYE: CPT

## 2021-01-30 PROCEDURE — 71045 X-RAY EXAM CHEST 1 VIEW: CPT

## 2021-01-30 PROCEDURE — 87635 SARS-COV-2 COVID-19 AMP PRB: CPT

## 2021-01-30 PROCEDURE — 83605 ASSAY OF LACTIC ACID: CPT

## 2021-01-30 PROCEDURE — 36415 COLL VENOUS BLD VENIPUNCTURE: CPT

## 2021-01-30 PROCEDURE — 84484 ASSAY OF TROPONIN QUANT: CPT

## 2021-01-30 PROCEDURE — 82607 VITAMIN B-12: CPT

## 2021-01-30 PROCEDURE — 93005 ELECTROCARDIOGRAM TRACING: CPT

## 2021-01-30 PROCEDURE — 99283 EMERGENCY DEPT VISIT LOW MDM: CPT

## 2021-01-30 PROCEDURE — 80061 LIPID PANEL: CPT

## 2021-01-30 PROCEDURE — 84100 ASSAY OF PHOSPHORUS: CPT

## 2021-01-30 PROCEDURE — 86769 SARS-COV-2 COVID-19 ANTIBODY: CPT

## 2021-01-30 PROCEDURE — 87086 URINE CULTURE/COLONY COUNT: CPT

## 2021-01-30 PROCEDURE — U0005: CPT

## 2021-01-30 PROCEDURE — 99285 EMERGENCY DEPT VISIT HI MDM: CPT

## 2021-01-30 PROCEDURE — 82553 CREATINE MB FRACTION: CPT

## 2021-01-30 PROCEDURE — 82009 KETONE BODYS QUAL: CPT

## 2021-01-30 PROCEDURE — 81001 URINALYSIS AUTO W/SCOPE: CPT

## 2021-01-30 PROCEDURE — 80048 BASIC METABOLIC PNL TOTAL CA: CPT

## 2021-01-30 PROCEDURE — 84443 ASSAY THYROID STIM HORMONE: CPT

## 2021-01-30 PROCEDURE — 93306 TTE W/DOPPLER COMPLETE: CPT

## 2021-01-30 PROCEDURE — 83690 ASSAY OF LIPASE: CPT

## 2021-01-30 RX ORDER — LABETALOL HCL 100 MG
1 TABLET ORAL
Qty: 28 | Refills: 0
Start: 2021-01-30 | End: 2021-02-12

## 2021-01-30 RX ORDER — LABETALOL HCL 100 MG
100 TABLET ORAL
Refills: 0 | Status: DISCONTINUED | OUTPATIENT
Start: 2021-01-30 | End: 2021-01-30

## 2021-01-30 RX ORDER — LISINOPRIL 2.5 MG/1
5 TABLET ORAL ONCE
Refills: 0 | Status: COMPLETED | OUTPATIENT
Start: 2021-01-30 | End: 2021-01-30

## 2021-01-30 RX ORDER — LISINOPRIL 2.5 MG/1
1 TABLET ORAL
Qty: 30 | Refills: 0
Start: 2021-01-30 | End: 2021-02-28

## 2021-01-30 RX ORDER — LISINOPRIL 2.5 MG/1
10 TABLET ORAL DAILY
Refills: 0 | Status: DISCONTINUED | OUTPATIENT
Start: 2021-01-30 | End: 2021-01-30

## 2021-01-30 RX ORDER — LISINOPRIL 2.5 MG/1
5 TABLET ORAL DAILY
Refills: 0 | Status: DISCONTINUED | OUTPATIENT
Start: 2021-01-31 | End: 2021-01-30

## 2021-01-30 RX ADMIN — LISINOPRIL 5 MILLIGRAM(S): 2.5 TABLET ORAL at 10:18

## 2021-01-30 RX ADMIN — CLOPIDOGREL BISULFATE 75 MILLIGRAM(S): 75 TABLET, FILM COATED ORAL at 11:42

## 2021-01-30 RX ADMIN — HEPARIN SODIUM 5000 UNIT(S): 5000 INJECTION INTRAVENOUS; SUBCUTANEOUS at 05:36

## 2021-01-30 RX ADMIN — Medication 81 MILLIGRAM(S): at 11:42

## 2021-01-30 RX ADMIN — SERTRALINE 150 MILLIGRAM(S): 25 TABLET, FILM COATED ORAL at 11:41

## 2021-01-30 RX ADMIN — PREGABALIN 1000 MICROGRAM(S): 225 CAPSULE ORAL at 11:42

## 2021-01-30 RX ADMIN — PANTOPRAZOLE SODIUM 40 MILLIGRAM(S): 20 TABLET, DELAYED RELEASE ORAL at 05:35

## 2021-01-30 RX ADMIN — Medication 600 MILLIGRAM(S): at 05:35

## 2021-01-30 RX ADMIN — LISINOPRIL 5 MILLIGRAM(S): 2.5 TABLET ORAL at 05:36

## 2021-01-30 RX ADMIN — AMLODIPINE BESYLATE 10 MILLIGRAM(S): 2.5 TABLET ORAL at 05:35

## 2021-01-30 NOTE — ED PROVIDER NOTE - NEUROLOGICAL, MLM
Alert and oriented, no focal deficits, no motor or sensory deficits. Cn II-XIi intact, non-ataxic gait, normal finger to nose.

## 2021-01-30 NOTE — CHART NOTE - NSCHARTNOTEFT_GEN_A_CORE
Pt was admitted for syncope likely in the setting of vasovagal syncope vs orthostatic hypotension. Pt BP medications were adjusted. Pt was supposed to be discharged yesterday but he stayed due to high BP. Pt BP has been controlled now on Amlodipine 10mg and Lisinopril 10mg. Care plan discussed with the patient. He is agreed on the plan. Pt is for discharge today with close follow up with his PCP.   Case discussed with covering attending.
I was paged by RN that the patient's blood pressure is 179/95 before discharge. Patient refused to be discharged!
pt /120.5 with HR of 87.   10mg Labetalol given IV push.   Will monitor

## 2021-01-30 NOTE — ED ADULT NURSE REASSESSMENT NOTE - NS ED NURSE REASSESS COMMENT FT1
reliever rn: received pt.in bed AT 2130 pt.is alert and oriented x3.denies pain. re-eval by attending md with order to d/c home.left in the ed in stable condition. not in distress

## 2021-01-30 NOTE — ED PROVIDER NOTE - PATIENT PORTAL LINK FT
You can access the FollowMyHealth Patient Portal offered by [facility] by registering at the following website: [email or web address]. By joining [employer]’s FollowMyHealth portal, you will also be able to view your health information using other applications (apps) compatible with our system.

## 2021-01-30 NOTE — ED PROVIDER NOTE - CLINICAL SUMMARY MEDICAL DECISION MAKING FREE TEXT BOX
65 yr old male with hx of anxiety, CAD (s/p stents in march 2020), HTN, hld presents to ed for elevated blood pressure without any sx.  pt was recently admitted to hospital for syncope likely orthostatic vs hypovolemia vs medication induced and was told to stop taking labetalol 100mg BID and maintained on norvasc and lisinopril. pt state took lisinopril at 6p and check bp was 175/102 and at 9p rpt vs 212/101 and was scared. pt cardio dr george and has appt with him. no cp, no sob, no visual changes, no n/v, no focal weakness, no changes in urine    benign HTN with anxiety. instructed to check BP and HR in am, afternoon and evening- write on paper and present to dr george, cardio. continue with meds.  return if symptoms worsens. dietary changes.

## 2021-01-30 NOTE — DISCHARGE NOTE NURSING/CASE MANAGEMENT/SOCIAL WORK - PATIENT PORTAL LINK FT
You can access the FollowMyHealth Patient Portal offered by [facility] by registering at the following website: [email or web address]. By joining [employer]’s FollowMyHealth portal, you will also be able to view your health information using other applications (apps) compatible with our system.

## 2021-01-30 NOTE — ED PROVIDER NOTE - OBJECTIVE STATEMENT
65 yr old male with hx of anxiety, CAD (s/p stents in march 2020), HTN, hld presents to ed for elevated blood pressure without any sx.  pt was recently admitted to hospital for syncope likely orthostatic vs hypovolemia vs medication induced and was told to stop taking labetalol 100mg BID and maintained on norvasc and lisinopril. pt state took lisinopril at 6p and check bp was 175/102 and at 9p rpt vs 212/101 and was scared. pt cardio dr george and has appt with him. no cp, no sob, no visual changes, no n/v, no focal weakness, no changes in urine

## 2021-02-05 ENCOUNTER — RX RENEWAL (OUTPATIENT)
Age: 66
End: 2021-02-05

## 2021-02-08 LAB
CORTICOSTEROID BINDING GLOBULIN RESULT: 2 MG/DL — SIGNIFICANT CHANGE UP
CORTIS F/TOTAL MFR SERPL: 6.6 % — SIGNIFICANT CHANGE UP
CORTIS SERPL-MCNC: 9.3 UG/DL — SIGNIFICANT CHANGE UP
CORTISOL, FREE RESULT: 0.62 UG/DL — SIGNIFICANT CHANGE UP

## 2021-03-10 NOTE — ED PROVIDER NOTE - CPE EDP CARDIAC NORM
normal...
40 y/o male presents to PAST for left hand 3rd digit redial digital nerve repair possible reconstruction scheduled on 3/25 under general anesthesia at [facility] with DR Person

## 2021-03-11 ENCOUNTER — APPOINTMENT (OUTPATIENT)
Dept: CARDIOLOGY | Facility: CLINIC | Age: 66
End: 2021-03-11
Payer: MEDICAID

## 2021-03-11 VITALS
HEART RATE: 65 BPM | WEIGHT: 210 LBS | TEMPERATURE: 94 F | OXYGEN SATURATION: 98 % | HEIGHT: 67 IN | BODY MASS INDEX: 32.96 KG/M2 | DIASTOLIC BLOOD PRESSURE: 85 MMHG | SYSTOLIC BLOOD PRESSURE: 148 MMHG | RESPIRATION RATE: 16 BRPM

## 2021-03-11 DIAGNOSIS — E66.9 OBESITY, UNSPECIFIED: ICD-10-CM

## 2021-03-11 PROCEDURE — 99214 OFFICE O/P EST MOD 30 MIN: CPT

## 2021-03-11 PROCEDURE — 99072 ADDL SUPL MATRL&STAF TM PHE: CPT

## 2021-03-11 PROCEDURE — 93000 ELECTROCARDIOGRAM COMPLETE: CPT

## 2021-03-30 PROBLEM — E66.9 OBESITY: Status: ACTIVE | Noted: 2020-09-16

## 2021-05-09 ENCOUNTER — RX RENEWAL (OUTPATIENT)
Age: 66
End: 2021-05-09

## 2021-07-14 ENCOUNTER — APPOINTMENT (OUTPATIENT)
Dept: CARDIOLOGY | Facility: CLINIC | Age: 66
End: 2021-07-14

## 2021-08-12 ENCOUNTER — APPOINTMENT (OUTPATIENT)
Dept: CARDIOLOGY | Facility: CLINIC | Age: 66
End: 2021-08-12

## 2021-09-12 ENCOUNTER — EMERGENCY (EMERGENCY)
Facility: HOSPITAL | Age: 66
LOS: 1 days | Discharge: HOME | End: 2021-09-12
Attending: STUDENT IN AN ORGANIZED HEALTH CARE EDUCATION/TRAINING PROGRAM
Payer: MEDICARE

## 2021-09-12 VITALS
SYSTOLIC BLOOD PRESSURE: 175 MMHG | DIASTOLIC BLOOD PRESSURE: 95 MMHG | TEMPERATURE: 98 F | OXYGEN SATURATION: 97 % | WEIGHT: 212.75 LBS | HEART RATE: 80 BPM | RESPIRATION RATE: 18 BRPM | HEIGHT: 68 IN

## 2021-09-12 VITALS
SYSTOLIC BLOOD PRESSURE: 147 MMHG | RESPIRATION RATE: 18 BRPM | TEMPERATURE: 98 F | OXYGEN SATURATION: 98 % | HEART RATE: 62 BPM | DIASTOLIC BLOOD PRESSURE: 77 MMHG

## 2021-09-12 DIAGNOSIS — Z96.642 PRESENCE OF LEFT ARTIFICIAL HIP JOINT: Chronic | ICD-10-CM

## 2021-09-12 DIAGNOSIS — K60.3 ANAL FISTULA: Chronic | ICD-10-CM

## 2021-09-12 DIAGNOSIS — Z98.890 OTHER SPECIFIED POSTPROCEDURAL STATES: Chronic | ICD-10-CM

## 2021-09-12 LAB
ALBUMIN SERPL ELPH-MCNC: 4 G/DL — SIGNIFICANT CHANGE UP (ref 3.5–5)
ALP SERPL-CCNC: 87 U/L — SIGNIFICANT CHANGE UP (ref 40–120)
ALT FLD-CCNC: 24 U/L DA — SIGNIFICANT CHANGE UP (ref 10–60)
ANION GAP SERPL CALC-SCNC: 6 MMOL/L — SIGNIFICANT CHANGE UP (ref 5–17)
AST SERPL-CCNC: 16 U/L — SIGNIFICANT CHANGE UP (ref 10–40)
BASOPHILS # BLD AUTO: 0.02 K/UL — SIGNIFICANT CHANGE UP (ref 0–0.2)
BASOPHILS NFR BLD AUTO: 0.3 % — SIGNIFICANT CHANGE UP (ref 0–2)
BILIRUB SERPL-MCNC: 0.3 MG/DL — SIGNIFICANT CHANGE UP (ref 0.2–1.2)
BUN SERPL-MCNC: 22 MG/DL — HIGH (ref 7–18)
CALCIUM SERPL-MCNC: 8.5 MG/DL — SIGNIFICANT CHANGE UP (ref 8.4–10.5)
CHLORIDE SERPL-SCNC: 105 MMOL/L — SIGNIFICANT CHANGE UP (ref 96–108)
CO2 SERPL-SCNC: 27 MMOL/L — SIGNIFICANT CHANGE UP (ref 22–31)
CREAT SERPL-MCNC: 0.86 MG/DL — SIGNIFICANT CHANGE UP (ref 0.5–1.3)
EOSINOPHIL # BLD AUTO: 0.1 K/UL — SIGNIFICANT CHANGE UP (ref 0–0.5)
EOSINOPHIL NFR BLD AUTO: 1.5 % — SIGNIFICANT CHANGE UP (ref 0–6)
GLUCOSE SERPL-MCNC: 129 MG/DL — HIGH (ref 70–99)
HCT VFR BLD CALC: 35.2 % — LOW (ref 39–50)
HGB BLD-MCNC: 11.9 G/DL — LOW (ref 13–17)
IMM GRANULOCYTES NFR BLD AUTO: 0.3 % — SIGNIFICANT CHANGE UP (ref 0–1.5)
LYMPHOCYTES # BLD AUTO: 1.78 K/UL — SIGNIFICANT CHANGE UP (ref 1–3.3)
LYMPHOCYTES # BLD AUTO: 26.1 % — SIGNIFICANT CHANGE UP (ref 13–44)
MAGNESIUM SERPL-MCNC: 2.4 MG/DL — SIGNIFICANT CHANGE UP (ref 1.6–2.6)
MCHC RBC-ENTMCNC: 29.5 PG — SIGNIFICANT CHANGE UP (ref 27–34)
MCHC RBC-ENTMCNC: 33.8 GM/DL — SIGNIFICANT CHANGE UP (ref 32–36)
MCV RBC AUTO: 87.1 FL — SIGNIFICANT CHANGE UP (ref 80–100)
MONOCYTES # BLD AUTO: 0.49 K/UL — SIGNIFICANT CHANGE UP (ref 0–0.9)
MONOCYTES NFR BLD AUTO: 7.2 % — SIGNIFICANT CHANGE UP (ref 2–14)
NEUTROPHILS # BLD AUTO: 4.42 K/UL — SIGNIFICANT CHANGE UP (ref 1.8–7.4)
NEUTROPHILS NFR BLD AUTO: 64.6 % — SIGNIFICANT CHANGE UP (ref 43–77)
NRBC # BLD: 0 /100 WBCS — SIGNIFICANT CHANGE UP (ref 0–0)
PLATELET # BLD AUTO: 242 K/UL — SIGNIFICANT CHANGE UP (ref 150–400)
POTASSIUM SERPL-MCNC: 3.4 MMOL/L — LOW (ref 3.5–5.3)
POTASSIUM SERPL-SCNC: 3.4 MMOL/L — LOW (ref 3.5–5.3)
PROT SERPL-MCNC: 7.5 G/DL — SIGNIFICANT CHANGE UP (ref 6–8.3)
RBC # BLD: 4.04 M/UL — LOW (ref 4.2–5.8)
RBC # FLD: 13.2 % — SIGNIFICANT CHANGE UP (ref 10.3–14.5)
SARS-COV-2 RNA SPEC QL NAA+PROBE: SIGNIFICANT CHANGE UP
SODIUM SERPL-SCNC: 138 MMOL/L — SIGNIFICANT CHANGE UP (ref 135–145)
TROPONIN I SERPL-MCNC: <0.015 NG/ML — SIGNIFICANT CHANGE UP (ref 0–0.04)
WBC # BLD: 6.83 K/UL — SIGNIFICANT CHANGE UP (ref 3.8–10.5)
WBC # FLD AUTO: 6.83 K/UL — SIGNIFICANT CHANGE UP (ref 3.8–10.5)

## 2021-09-12 PROCEDURE — 99283 EMERGENCY DEPT VISIT LOW MDM: CPT

## 2021-09-12 PROCEDURE — 93005 ELECTROCARDIOGRAM TRACING: CPT

## 2021-09-12 PROCEDURE — 99285 EMERGENCY DEPT VISIT HI MDM: CPT

## 2021-09-12 PROCEDURE — 93010 ELECTROCARDIOGRAM REPORT: CPT

## 2021-09-12 NOTE — ED PROVIDER NOTE - PATIENT PORTAL LINK FT
You can access the FollowMyHealth Patient Portal offered by [facility] by registering at the following website: [email or web address]. By joining [employer]’s FollowMyHealth portal, you will also be able to view your health information using other applications (apps) compatible with our system.

## 2021-09-12 NOTE — ED PROVIDER NOTE - NSFOLLOWUPINSTRUCTIONS_ED_ALL_ED_FT
You were seen in the emergency department for high blood pressure.     Please follow-up with your primary care doctor in the next 24-48 hours.     IF you have any worsening symptoms, severe chest pain, shortness of breath or headache, please return to the emergency department.

## 2021-09-12 NOTE — ED PROVIDER NOTE - OBJECTIVE STATEMENT
66M, pmh of htn presenting with elevated blood pressure. patient reports his blood pressure was elevated after he ate dinner and he also had some chest pain. no active chest pain. no headache, nausea, vomiting, shortness of breath, nausea or vomiting.

## 2021-09-12 NOTE — ED ADULT TRIAGE NOTE - CHIEF COMPLAINT QUOTE
PT REPORTS ELEVATED BP @ 3:35 AM. 195/103. TOOK CLONIDINE 0.1MG PO. + CHEST PRESSURE AND PAIN IN BACK OF NECK. DENIES BLURRED VISION OR DIZZINESS

## 2021-09-12 NOTE — ED PROVIDER NOTE - CLINICAL SUMMARY MEDICAL DECISION MAKING FREE TEXT BOX
66M presenting with elevated blood pressure. has some chest pain. will get labs to r/o hypertensive emergency.

## 2021-09-16 ENCOUNTER — NON-APPOINTMENT (OUTPATIENT)
Age: 66
End: 2021-09-16

## 2021-09-16 ENCOUNTER — APPOINTMENT (OUTPATIENT)
Dept: CARDIOLOGY | Facility: CLINIC | Age: 66
End: 2021-09-16
Payer: MEDICARE

## 2021-09-16 VITALS
SYSTOLIC BLOOD PRESSURE: 158 MMHG | HEIGHT: 67 IN | OXYGEN SATURATION: 98 % | DIASTOLIC BLOOD PRESSURE: 88 MMHG | RESPIRATION RATE: 16 BRPM | WEIGHT: 210 LBS | HEART RATE: 58 BPM | BODY MASS INDEX: 32.96 KG/M2 | TEMPERATURE: 97.6 F

## 2021-09-16 PROCEDURE — 99214 OFFICE O/P EST MOD 30 MIN: CPT

## 2021-09-16 PROCEDURE — 93000 ELECTROCARDIOGRAM COMPLETE: CPT

## 2021-09-16 RX ORDER — LISINOPRIL 20 MG/1
20 TABLET ORAL
Qty: 90 | Refills: 3 | Status: DISCONTINUED | COMMUNITY
Start: 1900-01-01 | End: 2021-09-16

## 2021-09-16 RX ORDER — LISINOPRIL 40 MG/1
40 TABLET ORAL DAILY
Qty: 1 | Refills: 3 | Status: DISCONTINUED | COMMUNITY
Start: 2021-03-11 | End: 2021-09-16

## 2021-09-17 NOTE — REASON FOR VISIT
[FreeTextEntry1] : Colonoscopy planned for 9/23/21\par As per GI's request, patient will stop Plavix x 5 days\par Jv Drew (GI), FH\par \par BPs still not well controlled SBP 150s at home\par PMD added clonidine 0.1 mg prn SBP>160/90\par Will switch to losartan 50 then possibly 100 for lisinopril 40\par Continue amlodipine\par Prescriptions sent to pharmacy.\par \par He otherwise has no current cardiac complaints.\par \par Medication prescriptions sent to pharmacy.\par F/U in 6 months.\par \par PMH:\par I had the pleasure of re-evaluating your patient, Mr. Michael Lee, for follow-up cardiovascular evaluation. As you know, he is a 65 yo man with history of hypertension, hyperlipidemia, prediabetes, GERD, anxiety, borderline pulmonary HTN and CAD.\par \par I last saw him in the office in March 2021.  Since then, he reports he has remained clinically stable and asymptomatic from the cardiac perspective.\par \par Symptoms that he had attributed to prior COVID infection have improved.  He was noted to be positive for COVID Abs on recent testing.  He reports he has lost some weight over the past several months.\par \par BPs 150s/80s in the office today, similar to his home BPs, 12-lead ECG for palpitations shows SR, normal axis, normal intervals.  Physical examination unremarkable for acute cardiovascular findings.\par \par Medication adjustments as noted above.\par F/u in 3 months.\par Thank you for entrusting his care with us.\par \par Warmest regards,\par Ryan Suarez

## 2021-10-30 ENCOUNTER — EMERGENCY (EMERGENCY)
Facility: HOSPITAL | Age: 66
LOS: 1 days | Discharge: HOME | End: 2021-10-30
Attending: STUDENT IN AN ORGANIZED HEALTH CARE EDUCATION/TRAINING PROGRAM
Payer: MEDICARE

## 2021-10-30 VITALS
HEIGHT: 68 IN | DIASTOLIC BLOOD PRESSURE: 90 MMHG | WEIGHT: 205.03 LBS | TEMPERATURE: 98 F | RESPIRATION RATE: 16 BRPM | HEART RATE: 77 BPM | OXYGEN SATURATION: 97 % | SYSTOLIC BLOOD PRESSURE: 160 MMHG

## 2021-10-30 DIAGNOSIS — Z98.890 OTHER SPECIFIED POSTPROCEDURAL STATES: Chronic | ICD-10-CM

## 2021-10-30 DIAGNOSIS — Z96.642 PRESENCE OF LEFT ARTIFICIAL HIP JOINT: Chronic | ICD-10-CM

## 2021-10-30 DIAGNOSIS — K60.3 ANAL FISTULA: Chronic | ICD-10-CM

## 2021-10-30 LAB — SARS-COV-2 RNA SPEC QL NAA+PROBE: SIGNIFICANT CHANGE UP

## 2021-10-30 PROCEDURE — 93005 ELECTROCARDIOGRAM TRACING: CPT

## 2021-10-30 PROCEDURE — 99284 EMERGENCY DEPT VISIT MOD MDM: CPT

## 2021-10-30 PROCEDURE — 99284 EMERGENCY DEPT VISIT MOD MDM: CPT | Mod: 25

## 2021-10-30 PROCEDURE — 87635 SARS-COV-2 COVID-19 AMP PRB: CPT

## 2021-10-30 NOTE — ED PROVIDER NOTE - NSFOLLOWUPINSTRUCTIONS_ED_ALL_ED_FT
Please return to the Emergency Department if you experience any of the following symptoms:   - Shortness of breath or trouble breathing  - Pressure, pain or tightness in the chest  - Face drooping, arm weakness or speech difficulty  - Persistence of severe vomiting  - Head injury or loss of consciousness  - Nonstop bleeding or an open wound    (1) Follow up with your primary care physician within the next 24-48 hours as discussed. In addition, we did not find evidence of a life threatening illness on your testing here today, but listed below are the specialists that will be necessary to see as an outpatient to continue the workup.  Please call the numbers listed below or [phone] to set up the necessary appointments.  (2) Take Tylenol (up to 1000mg or 1 g)  and/or Motrin (up to 600mg) up to every 6 hours as needed for pain.   (3) If you had an IV (intravenous) line placed, it was removed. Sometimes, after IV removal, that area can be tender for a few days; if it develops redness and swelling, those could be signs of infection; in which case, return to the Emergency Department for assessment.  (4) Please continue taking all of your home medications as directed.

## 2021-10-30 NOTE — ED ADULT NURSE NOTE - OBJECTIVE STATEMENT
Patient presents to ED for headache. At present moment, he states he feels fine and wants to go home. He is ambulatory, gait steady. Refused blood work, stating that it was done at primary doctor office 1 month ago.

## 2021-10-30 NOTE — ED PROVIDER NOTE - OBJECTIVE STATEMENT
66-year-old male hx of HTN, HLD, presenting after dizziness/HTN earlier today now resolved. Was sitting when he developed neck pain, felt lightheaded and unsteady. Took his BP and noted it was high (systolic 150). Now his symptoms have resolved. Reports compliance to headache meds. Denies chest pain, SOB, urinary symptoms, or any other concerns.

## 2021-10-30 NOTE — ED PROVIDER NOTE - CLINICAL SUMMARY MEDICAL DECISION MAKING FREE TEXT BOX
66-year-old male hx of HTN, HLD, presenting after dizziness/HTN earlier today now resolved. Labs and CTH were ordered, but pt declined any testing, wants a COVID test and discharge. Appears well, can follow up with his outpatient providers. Will discharge.

## 2021-11-11 NOTE — ED PROVIDER NOTE - PATIENT PORTAL LINK FT
Dear PCP Provider: [facility]) has partnered with [facility] to utilize predictive analytics to improve the care management for patients with arthritic knee pain. Utilizing claims data and patient visit features, we have developed an algorithmic risk score to determine which patient's quality of life may be most impacted to their knee pain. The selection criteria for this  program are: 1) risk score greater than .85; 2) existing [address] [place] [address] Floor patient; and 3) seen for knee pain in the past 3 years. Based upon the predictive analytics risk score  program, your patient has a risk score of greater than . 85 (i.e. 85% probability in having their quality of life impacted by their knee pain). To assist with care management of your patient, a navigator will be contacting them to understand their knee pain status and to educate on the [address] Pain Program, including scheduling an appointment with a joint specialist or their PCP. If you feel this patient not appropriate to be contacted given other medical reasons, please reply back to the navigator within 7 days with reason why not to be contacted. Otherwise, the navigator will follow up with you on the details of the patient encounter after the call.  Thank you for your support for this program.
You can access the FollowMyHealth Patient Portal offered by [facility] by registering at the following website: [email or web address]. By joining [employer]’s FollowMyHealth portal, you will also be able to view your health information using other applications (apps) compatible with our system.

## 2021-12-15 ENCOUNTER — APPOINTMENT (OUTPATIENT)
Dept: DERMATOLOGY | Facility: CLINIC | Age: 66
End: 2021-12-15
Payer: MEDICARE

## 2021-12-15 DIAGNOSIS — Z12.83 ENCOUNTER FOR SCREENING FOR MALIGNANT NEOPLASM OF SKIN: ICD-10-CM

## 2021-12-15 DIAGNOSIS — B35.3 TINEA PEDIS: ICD-10-CM

## 2021-12-15 DIAGNOSIS — D22.9 MELANOCYTIC NEVI, UNSPECIFIED: ICD-10-CM

## 2021-12-15 DIAGNOSIS — L21.9 SEBORRHEIC DERMATITIS, UNSPECIFIED: ICD-10-CM

## 2021-12-15 DIAGNOSIS — Z85.828 PERSONAL HISTORY OF OTHER MALIGNANT NEOPLASM OF SKIN: ICD-10-CM

## 2021-12-15 PROCEDURE — 99214 OFFICE O/P EST MOD 30 MIN: CPT

## 2021-12-15 RX ORDER — KETOCONAZOLE 20 MG/G
2 CREAM TOPICAL TWICE DAILY
Qty: 1 | Refills: 3 | Status: ACTIVE | COMMUNITY
Start: 2021-12-15 | End: 1900-01-01

## 2021-12-16 ENCOUNTER — NON-APPOINTMENT (OUTPATIENT)
Age: 66
End: 2021-12-16

## 2021-12-16 ENCOUNTER — APPOINTMENT (OUTPATIENT)
Dept: CARDIOLOGY | Facility: CLINIC | Age: 66
End: 2021-12-16
Payer: MEDICARE

## 2021-12-16 VITALS
WEIGHT: 211 LBS | SYSTOLIC BLOOD PRESSURE: 162 MMHG | OXYGEN SATURATION: 96 % | HEART RATE: 67 BPM | BODY MASS INDEX: 33.12 KG/M2 | HEIGHT: 67 IN | DIASTOLIC BLOOD PRESSURE: 79 MMHG

## 2021-12-16 VITALS — SYSTOLIC BLOOD PRESSURE: 145 MMHG | DIASTOLIC BLOOD PRESSURE: 70 MMHG

## 2021-12-16 PROCEDURE — 99214 OFFICE O/P EST MOD 30 MIN: CPT

## 2021-12-16 PROCEDURE — 93000 ELECTROCARDIOGRAM COMPLETE: CPT

## 2022-01-06 ENCOUNTER — RX CHANGE (OUTPATIENT)
Age: 67
End: 2022-01-06

## 2022-02-01 ENCOUNTER — OUTPATIENT (OUTPATIENT)
Dept: OUTPATIENT SERVICES | Facility: HOSPITAL | Age: 67
LOS: 1 days | End: 2022-02-01

## 2022-02-01 ENCOUNTER — APPOINTMENT (OUTPATIENT)
Dept: CV DIAGNOSITCS | Facility: HOSPITAL | Age: 67
End: 2022-02-01
Payer: MEDICAID

## 2022-02-01 DIAGNOSIS — Z96.642 PRESENCE OF LEFT ARTIFICIAL HIP JOINT: Chronic | ICD-10-CM

## 2022-02-01 DIAGNOSIS — Z98.890 OTHER SPECIFIED POSTPROCEDURAL STATES: Chronic | ICD-10-CM

## 2022-02-01 DIAGNOSIS — K60.3 ANAL FISTULA: Chronic | ICD-10-CM

## 2022-02-01 DIAGNOSIS — I25.10 ATHEROSCLEROTIC HEART DISEASE OF NATIVE CORONARY ARTERY WITHOUT ANGINA PECTORIS: ICD-10-CM

## 2022-02-01 PROCEDURE — 93306 TTE W/DOPPLER COMPLETE: CPT | Mod: 26

## 2022-04-07 ENCOUNTER — NON-APPOINTMENT (OUTPATIENT)
Age: 67
End: 2022-04-07

## 2022-04-07 ENCOUNTER — APPOINTMENT (OUTPATIENT)
Dept: CARDIOLOGY | Facility: CLINIC | Age: 67
End: 2022-04-07
Payer: MEDICARE

## 2022-04-07 VITALS
BODY MASS INDEX: 31.07 KG/M2 | HEIGHT: 68 IN | WEIGHT: 205 LBS | TEMPERATURE: 97.6 F | OXYGEN SATURATION: 95 % | DIASTOLIC BLOOD PRESSURE: 95 MMHG | HEART RATE: 70 BPM | SYSTOLIC BLOOD PRESSURE: 167 MMHG

## 2022-04-07 VITALS — SYSTOLIC BLOOD PRESSURE: 130 MMHG | DIASTOLIC BLOOD PRESSURE: 70 MMHG

## 2022-04-07 PROCEDURE — 99214 OFFICE O/P EST MOD 30 MIN: CPT

## 2022-04-07 PROCEDURE — 93000 ELECTROCARDIOGRAM COMPLETE: CPT

## 2022-04-07 RX ORDER — AMLODIPINE BESYLATE 10 MG/1
10 TABLET ORAL DAILY
Qty: 90 | Refills: 3 | Status: ACTIVE | COMMUNITY
Start: 2021-03-11 | End: 1900-01-01

## 2022-04-07 RX ORDER — CLONIDINE HYDROCHLORIDE 0.1 MG/1
0.1 TABLET ORAL TWICE DAILY
Qty: 180 | Refills: 3 | Status: ACTIVE | COMMUNITY
Start: 2021-09-16 | End: 1900-01-01

## 2022-04-07 RX ORDER — ASPIRIN ENTERIC COATED TABLETS 81 MG 81 MG/1
81 TABLET, DELAYED RELEASE ORAL DAILY
Qty: 90 | Refills: 3 | Status: ACTIVE | COMMUNITY
Start: 2019-09-09 | End: 1900-01-01

## 2022-05-16 NOTE — BRIEF OPERATIVE NOTE - PRE-OP
<<-----Click on this checkbox to enter Pre-Op Dx Dupixent Pregnancy And Lactation Text: This medication likely crosses the placenta but the risk for the fetus is uncertain. This medication is excreted in breast milk.

## 2022-05-30 ENCOUNTER — EMERGENCY (EMERGENCY)
Facility: HOSPITAL | Age: 67
LOS: 1 days | Discharge: HOME | End: 2022-05-30
Attending: EMERGENCY MEDICINE
Payer: COMMERCIAL

## 2022-05-30 VITALS
OXYGEN SATURATION: 95 % | RESPIRATION RATE: 18 BRPM | SYSTOLIC BLOOD PRESSURE: 121 MMHG | DIASTOLIC BLOOD PRESSURE: 74 MMHG | TEMPERATURE: 98 F | HEIGHT: 68 IN | HEART RATE: 71 BPM

## 2022-05-30 DIAGNOSIS — Z98.890 OTHER SPECIFIED POSTPROCEDURAL STATES: Chronic | ICD-10-CM

## 2022-05-30 DIAGNOSIS — Z96.642 PRESENCE OF LEFT ARTIFICIAL HIP JOINT: Chronic | ICD-10-CM

## 2022-05-30 DIAGNOSIS — K60.3 ANAL FISTULA: Chronic | ICD-10-CM

## 2022-05-30 LAB
ALBUMIN SERPL ELPH-MCNC: 3.5 G/DL — SIGNIFICANT CHANGE UP (ref 3.5–5)
ALP SERPL-CCNC: 76 U/L — SIGNIFICANT CHANGE UP (ref 40–120)
ALT FLD-CCNC: 26 U/L DA — SIGNIFICANT CHANGE UP (ref 10–60)
ANION GAP SERPL CALC-SCNC: 7 MMOL/L — SIGNIFICANT CHANGE UP (ref 5–17)
AST SERPL-CCNC: 19 U/L — SIGNIFICANT CHANGE UP (ref 10–40)
BASOPHILS # BLD AUTO: 0.04 K/UL — SIGNIFICANT CHANGE UP (ref 0–0.2)
BASOPHILS NFR BLD AUTO: 0.6 % — SIGNIFICANT CHANGE UP (ref 0–2)
BILIRUB SERPL-MCNC: 0.4 MG/DL — SIGNIFICANT CHANGE UP (ref 0.2–1.2)
BUN SERPL-MCNC: 23 MG/DL — HIGH (ref 7–18)
CALCIUM SERPL-MCNC: 9.1 MG/DL — SIGNIFICANT CHANGE UP (ref 8.4–10.5)
CHLORIDE SERPL-SCNC: 106 MMOL/L — SIGNIFICANT CHANGE UP (ref 96–108)
CO2 SERPL-SCNC: 27 MMOL/L — SIGNIFICANT CHANGE UP (ref 22–31)
CREAT SERPL-MCNC: 1.83 MG/DL — HIGH (ref 0.5–1.3)
EGFR: 40 ML/MIN/1.73M2 — LOW
EOSINOPHIL # BLD AUTO: 0.09 K/UL — SIGNIFICANT CHANGE UP (ref 0–0.5)
EOSINOPHIL NFR BLD AUTO: 1.3 % — SIGNIFICANT CHANGE UP (ref 0–6)
GLUCOSE SERPL-MCNC: 132 MG/DL — HIGH (ref 70–99)
HCT VFR BLD CALC: 34.6 % — LOW (ref 39–50)
HGB BLD-MCNC: 11.9 G/DL — LOW (ref 13–17)
IMM GRANULOCYTES NFR BLD AUTO: 0.6 % — SIGNIFICANT CHANGE UP (ref 0–1.5)
LYMPHOCYTES # BLD AUTO: 1.37 K/UL — SIGNIFICANT CHANGE UP (ref 1–3.3)
LYMPHOCYTES # BLD AUTO: 19 % — SIGNIFICANT CHANGE UP (ref 13–44)
MAGNESIUM SERPL-MCNC: 2.4 MG/DL — SIGNIFICANT CHANGE UP (ref 1.6–2.6)
MCHC RBC-ENTMCNC: 30.3 PG — SIGNIFICANT CHANGE UP (ref 27–34)
MCHC RBC-ENTMCNC: 34.4 GM/DL — SIGNIFICANT CHANGE UP (ref 32–36)
MCV RBC AUTO: 88 FL — SIGNIFICANT CHANGE UP (ref 80–100)
MONOCYTES # BLD AUTO: 0.46 K/UL — SIGNIFICANT CHANGE UP (ref 0–0.9)
MONOCYTES NFR BLD AUTO: 6.4 % — SIGNIFICANT CHANGE UP (ref 2–14)
NEUTROPHILS # BLD AUTO: 5.2 K/UL — SIGNIFICANT CHANGE UP (ref 1.8–7.4)
NEUTROPHILS NFR BLD AUTO: 72.1 % — SIGNIFICANT CHANGE UP (ref 43–77)
NRBC # BLD: 0 /100 WBCS — SIGNIFICANT CHANGE UP (ref 0–0)
PLATELET # BLD AUTO: 265 K/UL — SIGNIFICANT CHANGE UP (ref 150–400)
POTASSIUM SERPL-MCNC: 3.9 MMOL/L — SIGNIFICANT CHANGE UP (ref 3.5–5.3)
POTASSIUM SERPL-SCNC: 3.9 MMOL/L — SIGNIFICANT CHANGE UP (ref 3.5–5.3)
PROT SERPL-MCNC: 7.2 G/DL — SIGNIFICANT CHANGE UP (ref 6–8.3)
RBC # BLD: 3.93 M/UL — LOW (ref 4.2–5.8)
RBC # FLD: 13.2 % — SIGNIFICANT CHANGE UP (ref 10.3–14.5)
SARS-COV-2 RNA SPEC QL NAA+PROBE: SIGNIFICANT CHANGE UP
SODIUM SERPL-SCNC: 140 MMOL/L — SIGNIFICANT CHANGE UP (ref 135–145)
TROPONIN I, HIGH SENSITIVITY RESULT: 6.7 NG/L — SIGNIFICANT CHANGE UP
WBC # BLD: 7.2 K/UL — SIGNIFICANT CHANGE UP (ref 3.8–10.5)
WBC # FLD AUTO: 7.2 K/UL — SIGNIFICANT CHANGE UP (ref 3.8–10.5)

## 2022-05-30 PROCEDURE — 93005 ELECTROCARDIOGRAM TRACING: CPT

## 2022-05-30 PROCEDURE — 71045 X-RAY EXAM CHEST 1 VIEW: CPT | Mod: 26

## 2022-05-30 PROCEDURE — 85025 COMPLETE CBC W/AUTO DIFF WBC: CPT

## 2022-05-30 PROCEDURE — 74018 RADEX ABDOMEN 1 VIEW: CPT | Mod: 26

## 2022-05-30 PROCEDURE — 80053 COMPREHEN METABOLIC PANEL: CPT

## 2022-05-30 PROCEDURE — 74018 RADEX ABDOMEN 1 VIEW: CPT

## 2022-05-30 PROCEDURE — 83735 ASSAY OF MAGNESIUM: CPT

## 2022-05-30 PROCEDURE — 99285 EMERGENCY DEPT VISIT HI MDM: CPT

## 2022-05-30 PROCEDURE — 84484 ASSAY OF TROPONIN QUANT: CPT

## 2022-05-30 PROCEDURE — 71045 X-RAY EXAM CHEST 1 VIEW: CPT

## 2022-05-30 PROCEDURE — 87635 SARS-COV-2 COVID-19 AMP PRB: CPT

## 2022-05-30 PROCEDURE — 99285 EMERGENCY DEPT VISIT HI MDM: CPT | Mod: 25

## 2022-05-30 PROCEDURE — 36415 COLL VENOUS BLD VENIPUNCTURE: CPT

## 2022-05-30 NOTE — ED ADULT NURSE NOTE - OBJECTIVE STATEMENT
c/o of RUQ abdominal pain and diarrhea x 2 days. Denies fever, nausea, and vomiting. Stated today, he felt weak and  his legs gave out after trying to have a BM in the bathroom. Denies CP and SOB

## 2022-05-30 NOTE — ED PROVIDER NOTE - NSFOLLOWUPINSTRUCTIONS_ED_ALL_ED_FT
Keep yourself well-hydrated with plenty of fluids.  Followup with PMD for reevaluation.  REturn to ED if you develop chest pain, dizziness/fainting.    Manténgase shanthi hidratado con muchos líquidos.  Seguimiento con PMD para reevaluación.  Regrese a la onesimo de urgencias si presenta dolor en el pecho, mareos o desmayos.      Presíncope    Near-Syncope       El presíncope ocurre cuando, súbitamente, usted se siente débil, mareado o morales si se fuera a desmayar. A esto también se le puede llamar amenaza de síncope. Se debe a la falta de flujo sanguíneo hacia el cerebro. Mandeep un episodio de presíncope, usted puede tener los siguientes síntomas:  •Sentirse mareado, débil o que va a desvanecerse.      •Siente malestar estomacal (náuseas).      •Karlene todo mcmahon o norma.      •Karlene puntos.      •Tener la piel fría y húmeda.      La causa de esta afección es bean disminución súbita del flujo de ismael al cerebro. Esta disminución puede ocurrir por varios motivos, evi la mayoría de las veces no es peligroso. Sin embargo, el presíncope puede ser bean señal de un problema médico grave, por eso es importante buscar atención médica.      Siga estas instrucciones en ly casa:    Medicamentos     •[place] los medicamentos de venta erich y los recetados solamente morales se lo haya indicado el médico.      •Si está tomando un medicamento para la presión arterial o para el corazón, póngase de pie lentamente, tómese algunos minutos para permanecer sentado y luego párese. [place] puede reducir los mareos.      Instrucciones generales     •Preste atención a cualquier cambio en los síntomas.      •Hable con el médico sobre elizabeth síntomas. Es posible que deba realizarse estudios para encontrar la causa del presíncope.      •Recuéstese inmediatamente si siente que va a desmayarse. [place] (eleve) los pies de modo que estén por encima del nivel del corazón. Respire profundamente y de manera continua. Espere hasta que los síntomas hayan desaparecido.      •Pídale a alguien que se quede con usted hasta que se sienta estable.      • No conduzca vehículos, no use maquinarias ni practique deportes hasta que el médico lo autorice.      •Jessica suficiente líquido para mantener el pis (la orina) de color amarillo pálido.      •Concurra a todas las visitas de seguimiento morales se lo haya indicado el médico. [place]ilea es importante.        Solicite ayuda inmediatamente si:    •Tiene convulsiones.    •Siente dolor en:  •El pecho.      •El vientre (abdomen).      •La espalda.        •Se desmaya bean o más veces.      •Tiene un dolor de vandana muy intenso.      •Le sangra la boca o el ano.      •Hace materia fecal (heces) royal o de aspecto alquitranado.      •Tiene latidos cardíacos muy rápidos o irregulares (palpitaciones).      •Está desorientado (confundido).      •Tiene dificultad para caminar.      •Está muy débil.      •Tiene dificultad para karlene.      Estos síntomas pueden indicar bean emergencia. No espere a karlene si los síntomas desaparecen. Solicite atención médica de inmediato. Comuníquese con el servicio de emergencias de ly localidad (911 en los [place]). No conduzca por elizabeth propios medios hasta el hospital.       Resumen    •El presíncope ocurre cuando, súbitamente, usted se siente débil, mareado o morales si se fuera a desmayar.      •La causa de esta afección es la falta de flujo de ismael al cerebro.      •El presíncope puede ser bean señal de un problema médico grave, por eso es importante buscar atención médica.

## 2022-05-30 NOTE — ED ADULT TRIAGE NOTE - HISTORY OF COVID-19 VACCINATION
Patient:   PREMA ZHANG            MRN: [id number]            FIN: [id number]              Age:   88 years     Sex:  MALE     :  [birth year]   Associated Diagnoses:   None   Author:   DARIUS MULLINS     Basic Information   History source: Patient.   Medications: Per nurse's notes.     History of Present Illness   [facility] HISTORY AND PHYSICAL:  Primary Care Physician: Dr. Tomás Rodriguez   Chief complaint: Shortness of breath   History of Present Illness:  This is a 88-year-old male, patient of Dr. Tomás Rodriguez, with past medical history significant for questionable CHF, primary hypertension, hyperlipidemia, who presented to the ED with chief complaint of shortness of breath.  Upon arrival to ED, patient was afebrile, , RR 34, SpO2 91%, /123. Labs significant for lactic acid 2.5, NTproBNP 598, WBC 11.1. VBG noted pH 7.27, PCO2 65, PO2 52. CXR noted Heart size is normal.  Left diaphragm is elevated.  There are subtle interstitial changes in both lower lungs suggesting mild interstitial edema.  Remaining lungs are clear. The patient was given lasix, nitroglycerin, duonebs, and cefepime in the ED and was  admitted for further evaluation and treatment.   Patient was seen and examined in his hospital room.  Patient denies fever, chills, cough, chest pain, shortness of breath, palpitations, dizziness, lightheadedness, abdominal pain, nausea, vomiting, constipation, diarrhea, hematemesis dysuria, hematuria/hematochezia, paresthesias blurred vision sensory or motor changes, unilateral weakness, headache, or rashes, or any other complaint.  Review of Systems:   All systems otherwise reviewed and negative.  Past Medical History:  Questionable CHF  Primary hypertension  Hyperlipidemia  Past Surgical History:  Appendectomy   Hernia repair   Family History:  Non-contributory   Social History:  No alcohol, tobacco, or illicit drug use   Home Medications (1) Active  acetaminophen-HYDROcodone oral 325-5 mg  tablet 1 tab, PRN, Oral, Q4hr    Allergies (1) Active Reaction  NKA None Documented   .       Physical Examination               Vital signs   Vital Signs   11/25/19 04:55 CST Temperature - VS 36.0 deg_C  Normal    Temperature Source - VS Tympanic    Heart/Pulse Rate 77  Normal    Pulse Source Monitor    Respiration Rate 18 breaths/min  HI    SpO2 98 %  Normal    NIBP Systolic 131  Normal    NIBP Diastolic 71  Normal    NIBP Source Right Arm    NIBP MAP 91  Normal   .   Height and Weight   11/25/19 05:35 CST CLINICALWEIGHT 89.6 kg    Weight Method Measured    MEDDOSEWT 89.6 kg    CLINICALHEIGHT 172 cm    Height Method Stated    Weight Scale Standing    BSA - Medical History 2.03    BMI-Medical History 30.3 kg/m2   .   General:  Alert, no acute distress.    Skin:  Warm, dry, normal for ethnicity.    Ears, nose, mouth and throat:  Tympanic membranes clear, oral mucosa moist, no pharyngeal erythema or exudate.   Chest wall:  No tenderness, No deformity.    Cardiovascular:  Regular rate and rhythm, No murmur, Normal peripheral perfusion, trace bilateral lower extremity edema.   Respiratory:  Lungs are clear to auscultation, respirations are non-labored, breath sounds are equal, Symmetrical chest wall expansion, 6 liters o2 per nasal cannula.   Eye  Pupils are equal, round and reactive to light, extraocular movements are intact, normal conjunctiva, vision unchanged.         Additional physical exam information:  .     Medical Decision Making   Results review:  Interpretation Consistent with previous results, lactate down to 2.3,  Labs between:  24-NOV-2019 11:41 to 25-NOV-2019 11:41  CBC:                 WBC  HgB  Hct  Plt  MCV  RDW   24-NOV-2019 (H) 11.1  16.1  50.6  210  91.7  13.3   DIFF:                 Seg  Neutroph//ABS  Lymph//ABS  Mono//ABS  EOS/ABS  24-NOV-2019 NOT APPLICABLE  55 // 6.1 31 // 3.4 8 // 0.9 4 // 0.4  BMP:                 Na  Cl  BUN  Glu   24-NOV-2019 138  103  18  (H) 170                               K  CO2  Cr  Ca                              3.5  29  (L) 0.58  8.7   CMP:                 AST  ALT  AlkPhos  Bili  Albumin   24-NOV-2019 25  26  82  0.5  4.0   COAG:                 INR  PT  PTT  Ddimer  Fibrinogen    24-NOV-2019 1.0  10.8                                         Result title:  XR CHEST PORTABLE 1V  Result status:  Final  Verified by:  LIOR RODRIGUEZ on 11/25/2019 08:10  EXAM XR CHEST PORTABLE 1V.CLINICAL INDICATION: Shortness of breath.COMPARISON: None.  IMPRESSION: Heart size is normal.  Left diaphragm is elevated.  There are subtle interstitial changes in both lower lungs suggesting mild interstitial edema.  Remaining lungs are clear. .   Rationale:  ASSESSMENT AND PLAN:  Shortness of breath in a patient with questionable history of CHF  CXR (11/25) noted Heart size is normal.  Left diaphragm is elevated.  There are subtle interstitial changes in both lower lungs suggesting mild interstitial edema.  Remaining lungs are clear.  VBG: pH 7.27, PCO2 65, PO2 52, Bicarb 28  NTproBNP 598  Patient currently oxygenating on 6L NCO2, wean as tolerated  s/p lasix and duo nebs given in the ED   - Echo pending    - Continue lasix 40mg BID    - Cardiology on consult     SIRS concerning for sepsis   Evidenced by tachycardia (resolved), tachypnea (intermittent), and leukocytosis (WBC 11.1 on 11/24), with associated lactic acidosis (LA 2.3 on 11/25)  - Blood cx NGTD, final pending; RPP pending    - Abx coverage with cefepime   Hypertensive urgency in the setting of primary hypertension   /123 on arrival, BP now normotensive  Hyperlipidemia  CODE STATUS: Full code  DVT PPx: SCDs  Disposition: Pending clinical improvement  PCP: Dr. Tomás Rodriguez   All labs and imaging reviewed  All patient concerns addressed  Charting [facility] rambo Romero NP   Care and management of patient collaborated with Dr. Lucita Romero, APRN, FNP-BC  [facility]  [phone], All medical record entries made by the anand were at my direction. I have reviewed the chart and agree that the record accurately reflects my personal performance of the history, physical exam, hospital course, and assessment and plan..  Patient seen and examined along with NP Nick, agree with the plan as above.  Patient here for SOB, likely new onset CHF.  Echo pending.  c/w IV Lasix for diuresis,  Cards consulted, possible stress test in AM.   Yes

## 2022-05-30 NOTE — ED PROVIDER NOTE - OBJECTIVE STATEMENT
67 y/o male with a history of CAD s/p stent, hypertension and hyperlipidemia coming in with dizziness. Patient reports he was constipated the last couple of days and he took a laxative yesterday and today. Tonight he was sitting on the toilet trying to have bowel movements and he started feeling dizzy. He stood up, went back to his bedroom and fell to his knees. He denies any loss of consciousness and EMS was called. Patient denies any chest pain or any focal weakness or numbness. Patient endorses mild abdominal discomfort but denies any fever, vomiting, cough, dysuria or hematuria. NKDA. 65 y/o male with a history of CAD s/p stent, hypertension and hyperlipidemia coming in with dizziness. Patient reports he was constipated the last couple of days and he took a laxative yesterday and today. Tonight he was sitting on the toilet trying to have bowel movements and he started feeling dizzy. He stood up, went back to his bedroom and fell to his knees. He denies any loss of consciousness and EMS was called. Patient denies any chest pain or any focal weakness or numbness. Patient endorses mild abdominal discomfort but denies any fever, vomiting, cough, dysuria or hematuria. NKDA.  Evaluation performed in Romansh language by me/MD.

## 2022-05-30 NOTE — ED PROVIDER NOTE - PATIENT PORTAL LINK FT
You can access the FollowMyHealth Patient Portal offered by [facility] by registering at the following website: [email or web address]. By joining [employer]’s FollowMyHealth portal, you will also be able to view your health information using other applications (apps) compatible with our system.

## 2022-05-31 VITALS
DIASTOLIC BLOOD PRESSURE: 79 MMHG | TEMPERATURE: 98 F | RESPIRATION RATE: 18 BRPM | SYSTOLIC BLOOD PRESSURE: 147 MMHG | OXYGEN SATURATION: 97 % | HEART RATE: 69 BPM

## 2022-06-24 ENCOUNTER — EMERGENCY (EMERGENCY)
Facility: HOSPITAL | Age: 67
LOS: 1 days | Discharge: HOME | End: 2022-06-24
Attending: STUDENT IN AN ORGANIZED HEALTH CARE EDUCATION/TRAINING PROGRAM
Payer: COMMERCIAL

## 2022-06-24 VITALS
HEART RATE: 70 BPM | OXYGEN SATURATION: 95 % | WEIGHT: 214.95 LBS | RESPIRATION RATE: 16 BRPM | DIASTOLIC BLOOD PRESSURE: 80 MMHG | SYSTOLIC BLOOD PRESSURE: 174 MMHG | HEIGHT: 68 IN | TEMPERATURE: 98 F

## 2022-06-24 DIAGNOSIS — Z98.890 OTHER SPECIFIED POSTPROCEDURAL STATES: Chronic | ICD-10-CM

## 2022-06-24 DIAGNOSIS — K60.3 ANAL FISTULA: Chronic | ICD-10-CM

## 2022-06-24 DIAGNOSIS — Z96.642 PRESENCE OF LEFT ARTIFICIAL HIP JOINT: Chronic | ICD-10-CM

## 2022-06-24 LAB
BASOPHILS # BLD AUTO: 0.02 K/UL — SIGNIFICANT CHANGE UP (ref 0–0.2)
BASOPHILS NFR BLD AUTO: 0.3 % — SIGNIFICANT CHANGE UP (ref 0–2)
CHLORIDE SERPL-SCNC: 105 MMOL/L — SIGNIFICANT CHANGE UP (ref 96–108)
EOSINOPHIL # BLD AUTO: 0.07 K/UL — SIGNIFICANT CHANGE UP (ref 0–0.5)
EOSINOPHIL NFR BLD AUTO: 1 % — SIGNIFICANT CHANGE UP (ref 0–6)
HCT VFR BLD CALC: 37.1 % — LOW (ref 39–50)
HGB BLD-MCNC: 12.3 G/DL — LOW (ref 13–17)
IMM GRANULOCYTES NFR BLD AUTO: 0.4 % — SIGNIFICANT CHANGE UP (ref 0–1.5)
LYMPHOCYTES # BLD AUTO: 1.97 K/UL — SIGNIFICANT CHANGE UP (ref 1–3.3)
LYMPHOCYTES # BLD AUTO: 27.9 % — SIGNIFICANT CHANGE UP (ref 13–44)
MCHC RBC-ENTMCNC: 29.6 PG — SIGNIFICANT CHANGE UP (ref 27–34)
MCHC RBC-ENTMCNC: 33.2 GM/DL — SIGNIFICANT CHANGE UP (ref 32–36)
MCV RBC AUTO: 89.2 FL — SIGNIFICANT CHANGE UP (ref 80–100)
MONOCYTES # BLD AUTO: 0.66 K/UL — SIGNIFICANT CHANGE UP (ref 0–0.9)
MONOCYTES NFR BLD AUTO: 9.3 % — SIGNIFICANT CHANGE UP (ref 2–14)
NEUTROPHILS # BLD AUTO: 4.31 K/UL — SIGNIFICANT CHANGE UP (ref 1.8–7.4)
NEUTROPHILS NFR BLD AUTO: 61.1 % — SIGNIFICANT CHANGE UP (ref 43–77)
NRBC # BLD: 0 /100 WBCS — SIGNIFICANT CHANGE UP (ref 0–0)
PLATELET # BLD AUTO: 264 K/UL — SIGNIFICANT CHANGE UP (ref 150–400)
POTASSIUM SERPL-MCNC: 3.7 MMOL/L — SIGNIFICANT CHANGE UP (ref 3.5–5.3)
POTASSIUM SERPL-SCNC: 3.7 MMOL/L — SIGNIFICANT CHANGE UP (ref 3.5–5.3)
RBC # BLD: 4.16 M/UL — LOW (ref 4.2–5.8)
RBC # FLD: 13.6 % — SIGNIFICANT CHANGE UP (ref 10.3–14.5)
SODIUM SERPL-SCNC: 140 MMOL/L — SIGNIFICANT CHANGE UP (ref 135–145)
WBC # BLD: 7.06 K/UL — SIGNIFICANT CHANGE UP (ref 3.8–10.5)
WBC # FLD AUTO: 7.06 K/UL — SIGNIFICANT CHANGE UP (ref 3.8–10.5)

## 2022-06-24 PROCEDURE — 99285 EMERGENCY DEPT VISIT HI MDM: CPT

## 2022-06-24 RX ORDER — METOCLOPRAMIDE HCL 10 MG
10 TABLET ORAL ONCE
Refills: 0 | Status: COMPLETED | OUTPATIENT
Start: 2022-06-24 | End: 2022-06-24

## 2022-06-24 RX ORDER — ACETAMINOPHEN 500 MG
650 TABLET ORAL ONCE
Refills: 0 | Status: COMPLETED | OUTPATIENT
Start: 2022-06-24 | End: 2022-06-24

## 2022-06-24 RX ADMIN — Medication 650 MILLIGRAM(S): at 23:40

## 2022-06-24 RX ADMIN — Medication 10 MILLIGRAM(S): at 23:40

## 2022-06-24 NOTE — ED ADULT NURSE NOTE - SUICIDE SCREENING QUESTION 1
PAIN Epidural block    Patient location during procedure: pain clinic  Start Time: 10/5/2017 12:03 PM  Stop Time: 10/5/2017 12:09 PM  Indication:procedure for pain  Performed By  Anesthesiologist: ALEKSANDER CLINE  Preanesthetic Checklist  Completed: patient identified, site marked, surgical consent, pre-op evaluation, timeout performed, IV checked, risks and benefits discussed and monitors and equipment checked  Additional Notes  Lumbar degen disc dz  Fluoro used  Prep:  Pt Position:prone  Sterile Tech:cap, gloves, mask and sterile barrier  Prep:chlorhexidine gluconate and isopropyl alcohol  Monitoring:blood pressure monitoring, continuous pulse oximetry and EKG  Procedure:  Approach:midline  Guidance: fluoroscopy  Location:lumbar  Level:4-5  Needle Type:Tuohy  Needle Gauge:20  Aspiration:negative  Medications:  Depomedrol:80  Preservative Free Saline:3mL    Post Assessment:  Dressing:occlusive dressing applied  Pt Tolerance:patient tolerated the procedure well with no apparent complications  Complications:no            
No

## 2022-06-24 NOTE — ED ADULT NURSE NOTE - NSIMPLEMENTINTERV_GEN_ALL_ED
Implemented All Universal Safety Interventions:  [place] to call system. Call bell, personal items and telephone within reach. Instruct patient to call for assistance. Room bathroom lighting operational. Non-slip footwear when patient is off stretcher. Physically safe environment: no spills, clutter or unnecessary equipment. Stretcher in lowest position, wheels locked, appropriate side rails in place.

## 2022-06-24 NOTE — ED ADULT TRIAGE NOTE - CHIEF COMPLAINT QUOTE
patient states " I was sitting on the table, I feel a little dizzy and my blood pressure was high "  Patient claims taking colon prep for colonscopy.

## 2022-06-25 ENCOUNTER — INPATIENT (INPATIENT)
Facility: HOSPITAL | Age: 67
LOS: 3 days | Discharge: HOME | DRG: 305 | End: 2022-06-29
Attending: HOSPITALIST | Admitting: HOSPITALIST
Payer: MEDICARE

## 2022-06-25 VITALS
SYSTOLIC BLOOD PRESSURE: 241 MMHG | DIASTOLIC BLOOD PRESSURE: 115 MMHG | RESPIRATION RATE: 16 BRPM | HEART RATE: 119 BPM | OXYGEN SATURATION: 96 % | HEIGHT: 68 IN | WEIGHT: 214.95 LBS | TEMPERATURE: 98 F

## 2022-06-25 VITALS
RESPIRATION RATE: 18 BRPM | OXYGEN SATURATION: 98 % | SYSTOLIC BLOOD PRESSURE: 174 MMHG | DIASTOLIC BLOOD PRESSURE: 70 MMHG

## 2022-06-25 DIAGNOSIS — Z98.890 OTHER SPECIFIED POSTPROCEDURAL STATES: Chronic | ICD-10-CM

## 2022-06-25 DIAGNOSIS — I16.0 HYPERTENSIVE URGENCY: ICD-10-CM

## 2022-06-25 DIAGNOSIS — K60.3 ANAL FISTULA: Chronic | ICD-10-CM

## 2022-06-25 DIAGNOSIS — Z96.642 PRESENCE OF LEFT ARTIFICIAL HIP JOINT: Chronic | ICD-10-CM

## 2022-06-25 LAB
ALBUMIN SERPL ELPH-MCNC: 4 G/DL — SIGNIFICANT CHANGE UP (ref 3.5–5)
ALBUMIN SERPL ELPH-MCNC: 4.2 G/DL — SIGNIFICANT CHANGE UP (ref 3.5–5)
ALP SERPL-CCNC: 76 U/L — SIGNIFICANT CHANGE UP (ref 40–120)
ALP SERPL-CCNC: 83 U/L — SIGNIFICANT CHANGE UP (ref 40–120)
ALT FLD-CCNC: 21 U/L DA — SIGNIFICANT CHANGE UP (ref 10–60)
ALT FLD-CCNC: 22 U/L DA — SIGNIFICANT CHANGE UP (ref 10–60)
ANION GAP SERPL CALC-SCNC: 8 MMOL/L — SIGNIFICANT CHANGE UP (ref 5–17)
ANION GAP SERPL CALC-SCNC: 9 MMOL/L — SIGNIFICANT CHANGE UP (ref 5–17)
APPEARANCE UR: CLEAR — SIGNIFICANT CHANGE UP
AST SERPL-CCNC: 15 U/L — SIGNIFICANT CHANGE UP (ref 10–40)
AST SERPL-CCNC: 16 U/L — SIGNIFICANT CHANGE UP (ref 10–40)
BACTERIA # UR AUTO: ABNORMAL /HPF
BASOPHILS # BLD AUTO: 0.02 K/UL — SIGNIFICANT CHANGE UP (ref 0–0.2)
BASOPHILS NFR BLD AUTO: 0.3 % — SIGNIFICANT CHANGE UP (ref 0–2)
BILIRUB SERPL-MCNC: 0.4 MG/DL — SIGNIFICANT CHANGE UP (ref 0.2–1.2)
BILIRUB SERPL-MCNC: 0.5 MG/DL — SIGNIFICANT CHANGE UP (ref 0.2–1.2)
BILIRUB UR-MCNC: NEGATIVE — SIGNIFICANT CHANGE UP
BUN SERPL-MCNC: 17 MG/DL — SIGNIFICANT CHANGE UP (ref 7–18)
BUN SERPL-MCNC: 21 MG/DL — HIGH (ref 7–18)
CALCIUM SERPL-MCNC: 8.6 MG/DL — SIGNIFICANT CHANGE UP (ref 8.4–10.5)
CALCIUM SERPL-MCNC: 8.8 MG/DL — SIGNIFICANT CHANGE UP (ref 8.4–10.5)
CHLORIDE SERPL-SCNC: 107 MMOL/L — SIGNIFICANT CHANGE UP (ref 96–108)
CK SERPL-CCNC: 122 U/L — SIGNIFICANT CHANGE UP (ref 35–232)
CO2 SERPL-SCNC: 24 MMOL/L — SIGNIFICANT CHANGE UP (ref 22–31)
CO2 SERPL-SCNC: 27 MMOL/L — SIGNIFICANT CHANGE UP (ref 22–31)
COLOR SPEC: YELLOW — SIGNIFICANT CHANGE UP
CREAT SERPL-MCNC: 0.91 MG/DL — SIGNIFICANT CHANGE UP (ref 0.5–1.3)
CREAT SERPL-MCNC: 1.03 MG/DL — SIGNIFICANT CHANGE UP (ref 0.5–1.3)
DIFF PNL FLD: NEGATIVE — SIGNIFICANT CHANGE UP
EGFR: 81 ML/MIN/1.73M2 — SIGNIFICANT CHANGE UP
EGFR: 94 ML/MIN/1.73M2 — SIGNIFICANT CHANGE UP
EOSINOPHIL # BLD AUTO: 0.07 K/UL — SIGNIFICANT CHANGE UP (ref 0–0.5)
EOSINOPHIL NFR BLD AUTO: 1.2 % — SIGNIFICANT CHANGE UP (ref 0–6)
EPI CELLS # UR: SIGNIFICANT CHANGE UP /HPF
ERYTHROCYTE [SEDIMENTATION RATE] IN BLOOD: 16 MM/HR — SIGNIFICANT CHANGE UP (ref 0–20)
GLUCOSE SERPL-MCNC: 107 MG/DL — HIGH (ref 70–99)
GLUCOSE SERPL-MCNC: 119 MG/DL — HIGH (ref 70–99)
GLUCOSE UR QL: NEGATIVE — SIGNIFICANT CHANGE UP
GRAN CASTS # UR COMP ASSIST: ABNORMAL /LPF
HCT VFR BLD CALC: 34.4 % — LOW (ref 39–50)
HGB BLD-MCNC: 12 G/DL — LOW (ref 13–17)
HYALINE CASTS # UR AUTO: ABNORMAL /LPF
IMM GRANULOCYTES NFR BLD AUTO: 0.3 % — SIGNIFICANT CHANGE UP (ref 0–1.5)
KETONES UR-MCNC: NEGATIVE — SIGNIFICANT CHANGE UP
LEUKOCYTE ESTERASE UR-ACNC: NEGATIVE — SIGNIFICANT CHANGE UP
LYMPHOCYTES # BLD AUTO: 1.56 K/UL — SIGNIFICANT CHANGE UP (ref 1–3.3)
LYMPHOCYTES # BLD AUTO: 25.9 % — SIGNIFICANT CHANGE UP (ref 13–44)
MAGNESIUM SERPL-MCNC: 2.4 MG/DL — SIGNIFICANT CHANGE UP (ref 1.6–2.6)
MCHC RBC-ENTMCNC: 30.2 PG — SIGNIFICANT CHANGE UP (ref 27–34)
MCHC RBC-ENTMCNC: 34.9 GM/DL — SIGNIFICANT CHANGE UP (ref 32–36)
MCV RBC AUTO: 86.4 FL — SIGNIFICANT CHANGE UP (ref 80–100)
MONOCYTES # BLD AUTO: 0.46 K/UL — SIGNIFICANT CHANGE UP (ref 0–0.9)
MONOCYTES NFR BLD AUTO: 7.6 % — SIGNIFICANT CHANGE UP (ref 2–14)
NEUTROPHILS # BLD AUTO: 3.9 K/UL — SIGNIFICANT CHANGE UP (ref 1.8–7.4)
NEUTROPHILS NFR BLD AUTO: 64.7 % — SIGNIFICANT CHANGE UP (ref 43–77)
NITRITE UR-MCNC: NEGATIVE — SIGNIFICANT CHANGE UP
NRBC # BLD: 0 /100 WBCS — SIGNIFICANT CHANGE UP (ref 0–0)
PH UR: 6.5 — SIGNIFICANT CHANGE UP (ref 5–8)
PLATELET # BLD AUTO: 246 K/UL — SIGNIFICANT CHANGE UP (ref 150–400)
POTASSIUM SERPL-MCNC: 3.4 MMOL/L — LOW (ref 3.5–5.3)
POTASSIUM SERPL-SCNC: 3.4 MMOL/L — LOW (ref 3.5–5.3)
PROT SERPL-MCNC: 7.1 G/DL — SIGNIFICANT CHANGE UP (ref 6–8.3)
PROT SERPL-MCNC: 7.9 G/DL — SIGNIFICANT CHANGE UP (ref 6–8.3)
PROT UR-MCNC: 30 MG/DL
RBC # BLD: 3.98 M/UL — LOW (ref 4.2–5.8)
RBC # FLD: 13.4 % — SIGNIFICANT CHANGE UP (ref 10.3–14.5)
RBC CASTS # UR COMP ASSIST: SIGNIFICANT CHANGE UP /HPF (ref 0–2)
SARS-COV-2 RNA SPEC QL NAA+PROBE: SIGNIFICANT CHANGE UP
SODIUM SERPL-SCNC: 140 MMOL/L — SIGNIFICANT CHANGE UP (ref 135–145)
SP GR SPEC: 1.01 — SIGNIFICANT CHANGE UP (ref 1.01–1.02)
TROPONIN I, HIGH SENSITIVITY RESULT: 6.4 NG/L — SIGNIFICANT CHANGE UP
TROPONIN I, HIGH SENSITIVITY RESULT: 8.9 NG/L — SIGNIFICANT CHANGE UP
UROBILINOGEN FLD QL: NEGATIVE — SIGNIFICANT CHANGE UP
WBC # BLD: 6.03 K/UL — SIGNIFICANT CHANGE UP (ref 3.8–10.5)
WBC # FLD AUTO: 6.03 K/UL — SIGNIFICANT CHANGE UP (ref 3.8–10.5)
WBC UR QL: SIGNIFICANT CHANGE UP /HPF (ref 0–5)

## 2022-06-25 PROCEDURE — 70450 CT HEAD/BRAIN W/O DYE: CPT | Mod: 26,MA

## 2022-06-25 PROCEDURE — 80053 COMPREHEN METABOLIC PANEL: CPT

## 2022-06-25 PROCEDURE — 84484 ASSAY OF TROPONIN QUANT: CPT

## 2022-06-25 PROCEDURE — 96374 THER/PROPH/DIAG INJ IV PUSH: CPT

## 2022-06-25 PROCEDURE — 71045 X-RAY EXAM CHEST 1 VIEW: CPT | Mod: 26

## 2022-06-25 PROCEDURE — 70450 CT HEAD/BRAIN W/O DYE: CPT | Mod: MA

## 2022-06-25 PROCEDURE — 82962 GLUCOSE BLOOD TEST: CPT

## 2022-06-25 PROCEDURE — 85025 COMPLETE CBC W/AUTO DIFF WBC: CPT

## 2022-06-25 PROCEDURE — 93005 ELECTROCARDIOGRAM TRACING: CPT

## 2022-06-25 PROCEDURE — 99285 EMERGENCY DEPT VISIT HI MDM: CPT

## 2022-06-25 PROCEDURE — 36415 COLL VENOUS BLD VENIPUNCTURE: CPT

## 2022-06-25 PROCEDURE — 96375 TX/PRO/DX INJ NEW DRUG ADDON: CPT

## 2022-06-25 PROCEDURE — 99285 EMERGENCY DEPT VISIT HI MDM: CPT | Mod: 25

## 2022-06-25 PROCEDURE — 99223 1ST HOSP IP/OBS HIGH 75: CPT | Mod: GC

## 2022-06-25 RX ORDER — LABETALOL HCL 100 MG
20 TABLET ORAL ONCE
Refills: 0 | Status: COMPLETED | OUTPATIENT
Start: 2022-06-25 | End: 2022-06-25

## 2022-06-25 RX ORDER — KETOROLAC TROMETHAMINE 30 MG/ML
15 SYRINGE (ML) INJECTION ONCE
Refills: 0 | Status: DISCONTINUED | OUTPATIENT
Start: 2022-06-25 | End: 2022-06-25

## 2022-06-25 RX ORDER — LISINOPRIL 2.5 MG/1
20 TABLET ORAL ONCE
Refills: 0 | Status: COMPLETED | OUTPATIENT
Start: 2022-06-25 | End: 2022-06-25

## 2022-06-25 RX ORDER — AMLODIPINE BESYLATE 2.5 MG/1
10 TABLET ORAL ONCE
Refills: 0 | Status: COMPLETED | OUTPATIENT
Start: 2022-06-25 | End: 2022-06-25

## 2022-06-25 RX ORDER — POTASSIUM CHLORIDE 20 MEQ
40 PACKET (EA) ORAL ONCE
Refills: 0 | Status: COMPLETED | OUTPATIENT
Start: 2022-06-25 | End: 2022-06-25

## 2022-06-25 RX ADMIN — Medication 0.1 MILLIGRAM(S): at 01:27

## 2022-06-25 RX ADMIN — AMLODIPINE BESYLATE 10 MILLIGRAM(S): 2.5 TABLET ORAL at 14:42

## 2022-06-25 RX ADMIN — Medication 650 MILLIGRAM(S): at 00:36

## 2022-06-25 RX ADMIN — Medication 40 MILLIEQUIVALENT(S): at 17:36

## 2022-06-25 RX ADMIN — LISINOPRIL 20 MILLIGRAM(S): 2.5 TABLET ORAL at 14:41

## 2022-06-25 RX ADMIN — Medication 0.1 MILLIGRAM(S): at 18:05

## 2022-06-25 RX ADMIN — Medication 15 MILLIGRAM(S): at 04:40

## 2022-06-25 NOTE — H&P ADULT - HISTORY OF PRESENT ILLNESS
Pt is a 66 year old male with a PMH of CAD s/p 1 stent in March 2020, GERD, Anxiety, Depression, HTN, HLD with PSH of Anal fistula repair, s/p cholecystectomy and Left Hip Arthoplasty presents to the ED with complaints of occipital and frontal headache for a day. Denies blurry vision, chest pain, lightheadedness, fever, and vomiting. Patient states he was here last night for high blood pressure and had labs and CT done which were negative and was discharged home early morning. He says he went home and went to bed but began to feel pressure on head. States he checked his blood pressure again and found it to be high. He reports having the following medications, Amlodipine 10mg, Lisinopril 20mg, and Clonidine 0.1mg / 1 tablet.  Pt is a 66 year old male with a PMH of CAD s/p 1 stent in March 2020, GERD, Anxiety, Depression, HTN, HLD with PSH of Anal fistula repair, s/p cholecystectomy and Left Hip Arthoplasty presents to the ED with complaints of occipital and frontal headache for two days. He had severe headaches, frontal mostly, 8/10, and so he came to the ED yesterday midnight. Then he got some pain meds and IVF and went home, he felt better in the morning, but later started having severe headache and checked his BP where his Systolic was 212. So he came back to the ED. No c/o chest pain, shortness of breath, fever, N/V, abdominal pain, urinary complaints. No recent travel/sickness/ change in meds.

## 2022-06-25 NOTE — ED PROVIDER NOTE - NSFOLLOWUPINSTRUCTIONS_ED_ALL_ED_FT
Follow up with your PCP in 24-48 hours.   Continue taking your blood pressure medications as prescribed.   May take Tylenol and Motrin as directed on the bottle for pain control.   Return to the ER if you develop any new or worsening symptoms such as worsening headache, chest pain, shortness of breath, numbness, weakness, abdominal pain, nausea, vomiting, or visual changes.

## 2022-06-25 NOTE — H&P ADULT - NSHPPHYSICALEXAM_GEN_ALL_CORE
Vital Signs Last 24 Hrs  T(C): 36.5 (26 Jun 2022 01:11), Max: 36.8 (25 Jun 2022 20:28)  T(F): 97.7 (26 Jun 2022 01:11), Max: 98.2 (25 Jun 2022 20:28)  HR: 67 (26 Jun 2022 01:11) (65 - 119)  BP: 186/90 (26 Jun 2022 01:11) (174/70 - 241/115)  RR: 16 (26 Jun 2022 01:11) (16 - 18)  SpO2: 98% (26 Jun 2022 01:11) (96% - 98%)    GENERAL: NAD, lying in bed comfortably  HEAD:  Atraumatic, Normocephalic  EYES: EOMI, PERRLA, conjunctiva and sclera clear  ENT: Moist mucous membranes  NECK: Supple, No JVD  CHEST/LUNG: Clear to auscultation bilaterally; No rales, rhonchi, wheezing, or rubs. Unlabored respirations  HEART: Regular rate and rhythm; No murmurs, rubs, or gallops  ABDOMEN: Bowel sounds present; Soft, Nontender, Nondistended. No hepatomegaly  EXTREMITIES:  2+ Peripheral Pulses, brisk capillary refill. No clubbing, cyanosis, or edema  NERVOUS SYSTEM:  Alert & Oriented X3, speech clear. No deficits   MSK: FROM all 4 extremities, full and equal strength  SKIN: No rashes or lesions

## 2022-06-25 NOTE — ED PROVIDER NOTE - PROGRESS NOTE DETAILS
pt continue with elevated B/P, head pressure despite given all meds., will admit pt, case d/w Dr. Benitez

## 2022-06-25 NOTE — ED PROVIDER NOTE - CLINICAL SUMMARY MEDICAL DECISION MAKING FREE TEXT BOX
pt with elevated B/P, was seen in ED earlier with similar complaints, did not take his B/P meds except Clonidine, will repeat labs, give meds., reassess

## 2022-06-25 NOTE — ED PROVIDER NOTE - PATIENT PORTAL LINK FT
You can access the FollowMyHealth Patient Portal offered by [facility] by registering at the following website: [email or web address]. By joining [employer]’s FollowMyHealth portal, you will also be able to view your health information using other applications (apps) compatible with our system.

## 2022-06-25 NOTE — H&P ADULT - ATTENDING COMMENTS
Vital Signs Last 24 Hrs  T(C): 36.8 (25 Jun 2022 20:28), Max: 36.8 (25 Jun 2022 20:28)  T(F): 98.2 (25 Jun 2022 20:28), Max: 98.2 (25 Jun 2022 20:28)  HR: 65 (25 Jun 2022 20:28) (65 - 119)  BP: 186/98 (25 Jun 2022 20:28) (174/70 - 241/115)  BP(mean): --  RR: 18 (25 Jun 2022 20:28) (16 - 18)  SpO2: 97% (25 Jun 2022 20:28) (96% - 98%) Patient is a 66 year old male with PMH of CAD s/p stent in March 2020, GERD, Anxiety, Depression, HTN, HLD with PSH of Anal fistula repair, s/p cholecystectomy and Left Hip Arthoplasty p/w c/o occipital and frontal headache x1 day. Patient states he was seen here at  ED last night for high blood pressure and had labs and CT head done thar was negative and was discharged home early morning. He says he went home and went to bed but began to feel head pressure and he checked his blood pressure again and found it to be high and thus came back to ED. He reports having the following medications, Amlodipine 10mg, Lisinopril 20mg, and Clonidine 0.1mg / 1 tablet. Denies blurry vision, chest pain, lightheadedness, fever, and vomiting.     Vital Signs Last 24 Hrs  T(C): 36.8 (25 Jun 2022 20:28), Max: 36.8 (25 Jun 2022 20:28)  T(F): 98.2 (25 Jun 2022 20:28), Max: 98.2 (25 Jun 2022 20:28)  HR: 65 (25 Jun 2022 20:28) (65 - 119)  BP: 186/98 (25 Jun 2022 20:28) (174/70 - 241/115)  BP(mean): --  RR: 18 (25 Jun 2022 20:28) (16 - 18)  SpO2: 97% (25 Jun 2022 20:28) (96% - 98%)    AAox3, NAD  chest b/l CTA, RRR   abd soft, nt, nd  no FND    Labs and imaging studies noted.    Assessment and plan: 66 year old male with PMH of CAD s/p stent in March 2020, GERD, Anxiety, Depression, HTN, HLD with PSH of Anal fistula repair, s/p cholecystectomy and Left Hip Arthoplasty p/w c/o occipital and frontal headache x1 day 2/2 uncontrolled BP, admitted for HTN urgency.    Hypertensive urgency  CAD s/p stent 2020  GERD  Anxiety/ depression    - p/w uncontrolled BP, headache.  - negative CT head, no FND. trop neg.  - admitted to tele unit  - was given amlodipine, clonidine 0.1 mg in ED.   - will resume home antihypertensive regimen, inc amlodipine 10mg , clonidine 0.1 mg tid, and Lisinopril 10 mg.  - monitor vitals.  - last echo in feb 2022, preserved EF w/ grade IDD.  - resume home medications for CAD. GERD and anxiety.

## 2022-06-25 NOTE — ED PROVIDER NOTE - NS ED ROS FT
ROS:  GENERAL: No fever, no chills  EYES: no change in vision  HEENT: no trouble swallowing, no trouble speaking  CARDIAC: no chest pain  PULMONARY: no cough, no shortness of breath  GI: no abdominal pain, no nausea, no vomiting, diarrhea (+), no constipation  : No dysuria, no frequency, no change in appearance, or odor of urine  SKIN: no rashes  NEURO: headache(+), lightheadedness (+), no weakness  MSK: No joint pain    Diego Easley DO

## 2022-06-25 NOTE — H&P ADULT - PROBLEM SELECTOR PLAN 1
Pt p/w headaches  Vitals Hypertensive at 241/115mmHg  EKG showed NSR  Trops negative  CT head showed No acute intracranial hemorrhage, mass effect, or evidence of acute vascular territorial infarction  Creat 0.91  In ED was given labetelol 20 IV and amlodipine 10mg once  started on home meds of amlodipine, clonidine, lisinopril   monitor BPs Pt p/w headaches  Vitals Hypertensive at 241/115mmHg  EKG showed NSR  Trops negative  CT head showed No acute intracranial hemorrhage, mass effect, or evidence of acute vascular territorial infarction  Creat 0.91  In ED was given amlodipine 10mg once  started on home meds of amlodipine, clonidine, lisinopril   monitor BPs

## 2022-06-25 NOTE — ED PROVIDER NOTE - CHPI ED SYMPTOMS NEG
Skin normal color for race, warm, dry and intact. No evidence of rash. No urticaria no blurry vision, chest pain, lightheadedness/no fever/no vomiting

## 2022-06-25 NOTE — ED PROVIDER NOTE - OBJECTIVE STATEMENT
66 year old male with a PHMx of HLD and HTN present to the ED with complaints of occipital and frontal headache and high blood pressure. Denies blurry vision, chest pain, lightheadedness, fever, and vomiting. Patient states he was here last night for high blood pressure and had labs and CT done which were negative and was discharged home early morning. He says he went home and went to bed but began to feel pressure on head. States he checked his blood pressure again and found it to be high. He reports having the following medications, Amlodipine 10mg, Lisinopril 20mg, and Clonidine 0.1mg / 1 tablet.   NKDA. 66 year old male with a PHMx of HLD and HTN present to the ED with complaints of occipital and frontal headache and high blood pressure. Denies blurry vision, chest pain, lightheadedness, fever, and vomiting. Patient states he was here last night for high blood pressure and had labs and CT done which were negative and was discharged home early morning. He says he went home and went to bed but began to feel pressure on head. States he checked his blood pressure again and found it to be high. He reports having the following medications, Amlodipine 10mg, Lisinopril 20mg, and Clonidine 0.1mg / 1 tablet.   NKDA.  Pt last took clonidine @ 5am

## 2022-06-25 NOTE — H&P ADULT - ASSESSMENT
Pt is a 66 year old male with a PMH of CAD s/p 1 stent in March 2020, GERD, Anxiety, Depression, HTN, HLD with PSH of Anal fistula repair, s/p cholecystectomy and Left Hip Arthoplasty present to the ED with complaints of occipital and frontal headache and high blood pressure. Admitted for HTN urgency.

## 2022-06-25 NOTE — ED PROVIDER NOTE - CLINICAL SUMMARY MEDICAL DECISION MAKING FREE TEXT BOX
66M p/w lightheadedness, headache, high blood pressure, and diarrhea. Patient well-appearing, hypertensive, and neuro intact with benign abdomen. Sx maybe 2/2 dehydration from bowel prep. Hypertension maybe 2/2 to missing meds. Will screen with labs, head CT, and reassess. 66M p/w lightheadedness, headache, high blood pressure, and diarrhea. Patient well-appearing, hypertensive, neuro intact, benign abdomen. Sx maybe 2/2 dehydration from bowel prep. Hypertension maybe 2/2 to missing meds. Will screen with labs, head CT, and reassess.

## 2022-06-25 NOTE — ED PROVIDER NOTE - PHYSICAL EXAMINATION
Gen: AAOx3, non-toxic  Head: NCAT  HEENT: EOMI, oral mucosa moist, normal conjunctiva  Lung: CTAB, no respiratory distress, no wheezes/rhonchi/rales B/L, speaking in full sentences  CV: RRR, no murmurs, rubs or gallops  Abd: soft, NTND, no guarding, no CVA tenderness  MSK: no visible deformities  Neuro: No focal sensory or motor deficits, normal CN exam   Skin: Warm, well perfused, no rash  Psych: normal affect.     Diego Easley, DO

## 2022-06-25 NOTE — ED PROVIDER NOTE - OBJECTIVE STATEMENT
65 y/o male with PMHx including HTN, CAD, HLD, p/w lightheadedness, headache and high blood pressure. Patient states he has been prepping for a colonoscopy which was supposed to take place today and has been having diarrhea from the bowel prep. Patient felt lightheaded and began having headache when he arrived for the colonoscopy so it was cancelled which prompted him to come to ER. Patient states he missed his clonidine and losartan yesterday but did take it today. Patient denies any other symptoms.

## 2022-06-25 NOTE — H&P ADULT - PROBLEM SELECTOR PLAN 2
Pt has h/o CAD s/p 1 stent  Echo in Feb'22 showed Elham and grade 1 DDF  started on home meds of ASA, plavix, and atorvastatin

## 2022-06-26 ENCOUNTER — TRANSCRIPTION ENCOUNTER (OUTPATIENT)
Age: 67
End: 2022-06-26

## 2022-06-26 DIAGNOSIS — F41.8 OTHER SPECIFIED ANXIETY DISORDERS: ICD-10-CM

## 2022-06-26 DIAGNOSIS — I25.10 ATHEROSCLEROTIC HEART DISEASE OF NATIVE CORONARY ARTERY WITHOUT ANGINA PECTORIS: ICD-10-CM

## 2022-06-26 DIAGNOSIS — Z29.9 ENCOUNTER FOR PROPHYLACTIC MEASURES, UNSPECIFIED: ICD-10-CM

## 2022-06-26 DIAGNOSIS — I16.0 HYPERTENSIVE URGENCY: ICD-10-CM

## 2022-06-26 LAB
A1C WITH ESTIMATED AVERAGE GLUCOSE RESULT: 5.7 % — HIGH (ref 4–5.6)
ALBUMIN SERPL ELPH-MCNC: 4.1 G/DL — SIGNIFICANT CHANGE UP (ref 3.5–5)
ALP SERPL-CCNC: 81 U/L — SIGNIFICANT CHANGE UP (ref 40–120)
ALT FLD-CCNC: 19 U/L DA — SIGNIFICANT CHANGE UP (ref 10–60)
ANION GAP SERPL CALC-SCNC: 7 MMOL/L — SIGNIFICANT CHANGE UP (ref 5–17)
AST SERPL-CCNC: 17 U/L — SIGNIFICANT CHANGE UP (ref 10–40)
BASOPHILS # BLD AUTO: 0.02 K/UL — SIGNIFICANT CHANGE UP (ref 0–0.2)
BASOPHILS NFR BLD AUTO: 0.3 % — SIGNIFICANT CHANGE UP (ref 0–2)
BILIRUB SERPL-MCNC: 0.7 MG/DL — SIGNIFICANT CHANGE UP (ref 0.2–1.2)
BUN SERPL-MCNC: 17 MG/DL — SIGNIFICANT CHANGE UP (ref 7–18)
CALCIUM SERPL-MCNC: 9.5 MG/DL — SIGNIFICANT CHANGE UP (ref 8.4–10.5)
CHLORIDE SERPL-SCNC: 106 MMOL/L — SIGNIFICANT CHANGE UP (ref 96–108)
CHOLEST SERPL-MCNC: 135 MG/DL — SIGNIFICANT CHANGE UP
CO2 SERPL-SCNC: 27 MMOL/L — SIGNIFICANT CHANGE UP (ref 22–31)
CREAT SERPL-MCNC: 0.84 MG/DL — SIGNIFICANT CHANGE UP (ref 0.5–1.3)
EGFR: 97 ML/MIN/1.73M2 — SIGNIFICANT CHANGE UP
EOSINOPHIL # BLD AUTO: 0.09 K/UL — SIGNIFICANT CHANGE UP (ref 0–0.5)
EOSINOPHIL NFR BLD AUTO: 1.4 % — SIGNIFICANT CHANGE UP (ref 0–6)
ESTIMATED AVERAGE GLUCOSE: 117 MG/DL — HIGH (ref 68–114)
GLUCOSE SERPL-MCNC: 101 MG/DL — HIGH (ref 70–99)
HCT VFR BLD CALC: 37.4 % — LOW (ref 39–50)
HDLC SERPL-MCNC: 48 MG/DL — SIGNIFICANT CHANGE UP
HGB BLD-MCNC: 12.6 G/DL — LOW (ref 13–17)
IMM GRANULOCYTES NFR BLD AUTO: 0.3 % — SIGNIFICANT CHANGE UP (ref 0–1.5)
LIPID PNL WITH DIRECT LDL SERPL: 65 MG/DL — SIGNIFICANT CHANGE UP
LYMPHOCYTES # BLD AUTO: 2.06 K/UL — SIGNIFICANT CHANGE UP (ref 1–3.3)
LYMPHOCYTES # BLD AUTO: 31.6 % — SIGNIFICANT CHANGE UP (ref 13–44)
MAGNESIUM SERPL-MCNC: 2.7 MG/DL — HIGH (ref 1.6–2.6)
MCHC RBC-ENTMCNC: 30.1 PG — SIGNIFICANT CHANGE UP (ref 27–34)
MCHC RBC-ENTMCNC: 33.7 GM/DL — SIGNIFICANT CHANGE UP (ref 32–36)
MCV RBC AUTO: 89.3 FL — SIGNIFICANT CHANGE UP (ref 80–100)
MONOCYTES # BLD AUTO: 0.45 K/UL — SIGNIFICANT CHANGE UP (ref 0–0.9)
MONOCYTES NFR BLD AUTO: 6.9 % — SIGNIFICANT CHANGE UP (ref 2–14)
NEUTROPHILS # BLD AUTO: 3.88 K/UL — SIGNIFICANT CHANGE UP (ref 1.8–7.4)
NEUTROPHILS NFR BLD AUTO: 59.5 % — SIGNIFICANT CHANGE UP (ref 43–77)
NON HDL CHOLESTEROL: 87 MG/DL — SIGNIFICANT CHANGE UP
NRBC # BLD: 0 /100 WBCS — SIGNIFICANT CHANGE UP (ref 0–0)
PHOSPHATE SERPL-MCNC: 3 MG/DL — SIGNIFICANT CHANGE UP (ref 2.5–4.5)
PLATELET # BLD AUTO: 261 K/UL — SIGNIFICANT CHANGE UP (ref 150–400)
POTASSIUM SERPL-MCNC: 4.2 MMOL/L — SIGNIFICANT CHANGE UP (ref 3.5–5.3)
POTASSIUM SERPL-SCNC: 4.2 MMOL/L — SIGNIFICANT CHANGE UP (ref 3.5–5.3)
PROT SERPL-MCNC: 7.7 G/DL — SIGNIFICANT CHANGE UP (ref 6–8.3)
RBC # BLD: 4.19 M/UL — LOW (ref 4.2–5.8)
RBC # FLD: 13.6 % — SIGNIFICANT CHANGE UP (ref 10.3–14.5)
SODIUM SERPL-SCNC: 140 MMOL/L — SIGNIFICANT CHANGE UP (ref 135–145)
TRIGL SERPL-MCNC: 112 MG/DL — SIGNIFICANT CHANGE UP
TSH SERPL-MCNC: 0.64 UU/ML — SIGNIFICANT CHANGE UP (ref 0.34–4.82)
WBC # BLD: 6.52 K/UL — SIGNIFICANT CHANGE UP (ref 3.8–10.5)
WBC # FLD AUTO: 6.52 K/UL — SIGNIFICANT CHANGE UP (ref 3.8–10.5)

## 2022-06-26 PROCEDURE — 99232 SBSQ HOSP IP/OBS MODERATE 35: CPT

## 2022-06-26 RX ORDER — GEMFIBROZIL 600 MG
1 TABLET ORAL
Qty: 0 | Refills: 0 | DISCHARGE

## 2022-06-26 RX ORDER — ALBUTEROL 90 UG/1
2 AEROSOL, METERED ORAL EVERY 6 HOURS
Refills: 0 | Status: DISCONTINUED | OUTPATIENT
Start: 2022-06-26 | End: 2022-06-29

## 2022-06-26 RX ORDER — SERTRALINE 25 MG/1
1.5 TABLET, FILM COATED ORAL
Qty: 0 | Refills: 0 | DISCHARGE

## 2022-06-26 RX ORDER — ASPIRIN/CALCIUM CARB/MAGNESIUM 324 MG
81 TABLET ORAL DAILY
Refills: 0 | Status: DISCONTINUED | OUTPATIENT
Start: 2022-06-26 | End: 2022-06-29

## 2022-06-26 RX ORDER — SERTRALINE 25 MG/1
100 TABLET, FILM COATED ORAL DAILY
Refills: 0 | Status: DISCONTINUED | OUTPATIENT
Start: 2022-06-26 | End: 2022-06-29

## 2022-06-26 RX ORDER — PREGABALIN 225 MG/1
1000 CAPSULE ORAL DAILY
Refills: 0 | Status: DISCONTINUED | OUTPATIENT
Start: 2022-06-26 | End: 2022-06-29

## 2022-06-26 RX ORDER — HYDRALAZINE HCL 50 MG
10 TABLET ORAL ONCE
Refills: 0 | Status: COMPLETED | OUTPATIENT
Start: 2022-06-26 | End: 2022-06-26

## 2022-06-26 RX ORDER — OMEGA-3 ACID ETHYL ESTERS 1 G
1 CAPSULE ORAL
Qty: 0 | Refills: 0 | DISCHARGE

## 2022-06-26 RX ORDER — ATORVASTATIN CALCIUM 80 MG/1
40 TABLET, FILM COATED ORAL AT BEDTIME
Refills: 0 | Status: DISCONTINUED | OUTPATIENT
Start: 2022-06-26 | End: 2022-06-29

## 2022-06-26 RX ORDER — ASPIRIN/CALCIUM CARB/MAGNESIUM 324 MG
1 TABLET ORAL
Qty: 0 | Refills: 0 | DISCHARGE

## 2022-06-26 RX ORDER — ATORVASTATIN CALCIUM 80 MG/1
0 TABLET, FILM COATED ORAL
Qty: 0 | Refills: 3 | DISCHARGE

## 2022-06-26 RX ORDER — ACETAMINOPHEN 500 MG
650 TABLET ORAL EVERY 6 HOURS
Refills: 0 | Status: DISCONTINUED | OUTPATIENT
Start: 2022-06-26 | End: 2022-06-29

## 2022-06-26 RX ORDER — ERGOCALCIFEROL 1.25 MG/1
0 CAPSULE ORAL
Qty: 0 | Refills: 0 | DISCHARGE

## 2022-06-26 RX ORDER — GEMFIBROZIL 600 MG
600 TABLET ORAL
Refills: 0 | Status: DISCONTINUED | OUTPATIENT
Start: 2022-06-26 | End: 2022-06-29

## 2022-06-26 RX ORDER — SERTRALINE 25 MG/1
50 TABLET, FILM COATED ORAL DAILY
Refills: 0 | Status: DISCONTINUED | OUTPATIENT
Start: 2022-06-26 | End: 2022-06-29

## 2022-06-26 RX ORDER — GEMFIBROZIL 600 MG
0 TABLET ORAL
Qty: 0 | Refills: 2 | DISCHARGE

## 2022-06-26 RX ORDER — CLOPIDOGREL BISULFATE 75 MG/1
75 TABLET, FILM COATED ORAL DAILY
Refills: 0 | Status: DISCONTINUED | OUTPATIENT
Start: 2022-06-26 | End: 2022-06-29

## 2022-06-26 RX ORDER — LISINOPRIL 2.5 MG/1
10 TABLET ORAL DAILY
Refills: 0 | Status: DISCONTINUED | OUTPATIENT
Start: 2022-06-26 | End: 2022-06-27

## 2022-06-26 RX ORDER — PANTOPRAZOLE SODIUM 20 MG/1
40 TABLET, DELAYED RELEASE ORAL
Refills: 0 | Status: DISCONTINUED | OUTPATIENT
Start: 2022-06-26 | End: 2022-06-29

## 2022-06-26 RX ORDER — AMLODIPINE BESYLATE 2.5 MG/1
10 TABLET ORAL DAILY
Refills: 0 | Status: DISCONTINUED | OUTPATIENT
Start: 2022-06-26 | End: 2022-06-29

## 2022-06-26 RX ADMIN — AMLODIPINE BESYLATE 10 MILLIGRAM(S): 2.5 TABLET ORAL at 05:02

## 2022-06-26 RX ADMIN — Medication 650 MILLIGRAM(S): at 22:50

## 2022-06-26 RX ADMIN — Medication 600 MILLIGRAM(S): at 17:40

## 2022-06-26 RX ADMIN — Medication 0.1 MILLIGRAM(S): at 01:38

## 2022-06-26 RX ADMIN — PANTOPRAZOLE SODIUM 40 MILLIGRAM(S): 20 TABLET, DELAYED RELEASE ORAL at 08:07

## 2022-06-26 RX ADMIN — Medication 10 MILLIGRAM(S): at 19:06

## 2022-06-26 RX ADMIN — LISINOPRIL 10 MILLIGRAM(S): 2.5 TABLET ORAL at 01:38

## 2022-06-26 RX ADMIN — Medication 0.1 MILLIGRAM(S): at 08:58

## 2022-06-26 RX ADMIN — Medication 650 MILLIGRAM(S): at 22:02

## 2022-06-26 RX ADMIN — SERTRALINE 100 MILLIGRAM(S): 25 TABLET, FILM COATED ORAL at 13:28

## 2022-06-26 RX ADMIN — SERTRALINE 50 MILLIGRAM(S): 25 TABLET, FILM COATED ORAL at 13:29

## 2022-06-26 RX ADMIN — Medication 650 MILLIGRAM(S): at 01:38

## 2022-06-26 RX ADMIN — PREGABALIN 1000 MICROGRAM(S): 225 CAPSULE ORAL at 13:27

## 2022-06-26 RX ADMIN — Medication 0.1 MILLIGRAM(S): at 13:28

## 2022-06-26 RX ADMIN — Medication 0.1 MILLIGRAM(S): at 22:02

## 2022-06-26 RX ADMIN — Medication 10 MILLIGRAM(S): at 08:57

## 2022-06-26 RX ADMIN — ATORVASTATIN CALCIUM 40 MILLIGRAM(S): 80 TABLET, FILM COATED ORAL at 22:21

## 2022-06-26 RX ADMIN — Medication 600 MILLIGRAM(S): at 05:02

## 2022-06-26 RX ADMIN — Medication 650 MILLIGRAM(S): at 01:30

## 2022-06-26 RX ADMIN — Medication 81 MILLIGRAM(S): at 13:28

## 2022-06-26 RX ADMIN — CLOPIDOGREL BISULFATE 75 MILLIGRAM(S): 75 TABLET, FILM COATED ORAL at 13:27

## 2022-06-26 NOTE — PROGRESS NOTE ADULT - SUBJECTIVE AND OBJECTIVE BOX
MEDICAL ATTENDING NOTE  Patient is a 66y old  Male who presents with a chief complaint of HTN urgency (2022 23:17)      HPI:  Pt is a 66 year old male with a PMH of CAD s/p 1 stent in 2020, GERD, Anxiety, Depression, HTN, HLD with PSH of Anal fistula repair, s/p cholecystectomy and Left Hip Arthoplasty presents to the ED with complaints of occipital and frontal headache for two days. He had severe headaches, frontal mostly, 8/10, and so he came to the ED yesterday midnight. Then he got some pain meds and IVF and went home, he felt better in the morning, but later started having severe headache and checked his BP where his Systolic was 212. So he came back to the ED. No c/o chest pain, shortness of breath, fever, N/V, abdominal pain, urinary complaints. No recent travel/sickness/ change in meds. (2022 23:17)      INTERVAL HPI/OVERNIGHT EVENTS: no new complaints    MEDICATIONS  (STANDING):  amLODIPine   Tablet 10 milliGRAM(s) Oral daily  aspirin enteric coated 81 milliGRAM(s) Oral daily  atorvastatin 40 milliGRAM(s) Oral at bedtime  cloNIDine 0.1 milliGRAM(s) Oral three times a day  clopidogrel Tablet 75 milliGRAM(s) Oral daily  cyanocobalamin 1000 MICROGram(s) Oral daily  gemfibrozil 600 milliGRAM(s) Oral two times a day  lisinopril 10 milliGRAM(s) Oral daily  pantoprazole    Tablet 40 milliGRAM(s) Oral before breakfast  sertraline 100 milliGRAM(s) Oral daily  sertraline 50 milliGRAM(s) Oral daily    MEDICATIONS  (PRN):  acetaminophen     Tablet .. 650 milliGRAM(s) Oral every 6 hours PRN Mild Pain (1 - 3), Moderate Pain (4 - 6), Severe Pain (7 - 10)  ALBUTerol    90 MICROgram(s) HFA Inhaler 2 Puff(s) Inhalation every 6 hours PRN Shortness of Breath and/or Wheezing  LORazepam     Tablet 1 milliGRAM(s) Oral at bedtime PRN Anxiety      __________________________________________________  REVIEW OF SYSTEMS:    CONSTITUTIONAL: No fever,   EYES: no acute visual disturbances  NECK: No pain or stiffness  RESPIRATORY: No cough; No shortness of breath  CARDIOVASCULAR: No chest pain, no palpitations  GASTROINTESTINAL: No pain. No nausea or vomiting; No diarrhea   NEUROLOGICAL: No headache or numbness, no tremors  MUSCULOSKELETAL: No joint pain, no muscle pain  GENITOURINARY: no dysuria, no frequency, no hesitancy  PSYCHIATRY: no depression , no anxiety  ALL OTHER  ROS negative        Vital Signs Last 24 Hrs  T(C): 36.8 (2022 20:28), Max: 37.2 (2022 07:32)  T(F): 98.2 (2022 20:28), Max: 98.9 (2022 07:32)  HR: 71 (2022 20:28) (66 - 80)  BP: 157/80 (2022 20:28) (144/64 - 226/112)  BP(mean): --  RR: 17 (2022 20:28) (15 - 18)  SpO2: 97% (2022 20:28) (93% - 100%)    ________________________________________________  PHYSICAL EXAM:  GENERAL: NAD  HEENT: Normocephalic;  conjunctivae and sclerae clear; moist mucous membranes;   NECK : supple  CHEST/LUNG: Clear to auscultation bilaterally with good air entry   HEART: S1 S2  regular; no murmurs, gallops or rubs  ABDOMEN: Soft, Nontender, Nondistended; Bowel sounds present  EXTREMITIES: no cyanosis; no edema; no calf tenderness  SKIN: warm and dry; no rash  NERVOUS SYSTEM:  Awake and alert; Oriented  to place, person and time ; no new deficits    _________________________________________________  LABS:                        12.6   6.52  )-----------( 261      ( 2022 06:05 )             37.4     06-    140  |  106  |  17  ----------------------------<  101<H>  4.2   |  27  |  0.84    Ca    9.5      2022 06:04  Phos  3.0       Mg     2.7         TPro  7.7  /  Alb  4.1  /  TBili  0.7  /  DBili  x   /  AST  17  /  ALT  19  /  AlkPhos  81        Urinalysis Basic - ( 2022 19:40 )    Color: Yellow / Appearance: Clear / S.010 / pH: x  Gluc: x / Ketone: Negative  / Bili: Negative / Urobili: Negative   Blood: x / Protein: 30 mg/dL / Nitrite: Negative   Leuk Esterase: Negative / RBC: 0-2 /HPF / WBC 0-2 /HPF   Sq Epi: x / Non Sq Epi: Few /HPF / Bacteria: Few /HPF      CAPILLARY BLOOD GLUCOSE

## 2022-06-26 NOTE — CHART NOTE - NSCHARTNOTEFT_GEN_A_CORE
Paged by nursing staff pts bp noted to 226/112. Went bedside to assess the pt.   - Pt recently transported from the ED to the floor.   - Pt's medications were reviewed. Changed clonidine from BID to TID as per the plan.   - Gave one time IV push hydralazine 10 to help control BP.   - Will continue to monitor pts BP during the day.

## 2022-06-26 NOTE — PROGRESS NOTE ADULT - PROBLEM SELECTOR PLAN 1
Pt p/w headaches  Vitals Hypertensive at 241/115mmHg  EKG showed NSR  Trops negative  CT head showed No acute intracranial hemorrhage, mass effect, or evidence of acute vascular territorial infarction  Creat 0.91  In ED was given amlodipine 10mg once  started on home meds of amlodipine, clonidine, lisinopril   monitor BPs

## 2022-06-26 NOTE — DISCHARGE NOTE NURSING/CASE MANAGEMENT/SOCIAL WORK - NSDCPEFALRISK_GEN_ALL_CORE
For information on Fall & Injury Prevention, visit: [email or web address] OR  [email or web address] OR  https://www.cdc.gov/steadi/patient.html

## 2022-06-26 NOTE — DISCHARGE NOTE NURSING/CASE MANAGEMENT/SOCIAL WORK - PATIENT PORTAL LINK FT
You can access the FollowMyHealth Patient Portal offered by [facility] by registering at the following website: [email or web address]. By joining [employer]’s FollowMyHealth portal, you will also be able to view your health information using other applications (apps) compatible with our system.

## 2022-06-27 LAB
ALBUMIN SERPL ELPH-MCNC: 3.9 G/DL — SIGNIFICANT CHANGE UP (ref 3.5–5)
ALP SERPL-CCNC: 85 U/L — SIGNIFICANT CHANGE UP (ref 40–120)
ALT FLD-CCNC: 22 U/L DA — SIGNIFICANT CHANGE UP (ref 10–60)
ANION GAP SERPL CALC-SCNC: 9 MMOL/L — SIGNIFICANT CHANGE UP (ref 5–17)
AST SERPL-CCNC: 18 U/L — SIGNIFICANT CHANGE UP (ref 10–40)
BASOPHILS # BLD AUTO: 0.03 K/UL — SIGNIFICANT CHANGE UP (ref 0–0.2)
BASOPHILS NFR BLD AUTO: 0.5 % — SIGNIFICANT CHANGE UP (ref 0–2)
BILIRUB SERPL-MCNC: 0.6 MG/DL — SIGNIFICANT CHANGE UP (ref 0.2–1.2)
BUN SERPL-MCNC: 17 MG/DL — SIGNIFICANT CHANGE UP (ref 7–18)
CALCIUM SERPL-MCNC: 9.4 MG/DL — SIGNIFICANT CHANGE UP (ref 8.4–10.5)
CHLORIDE SERPL-SCNC: 108 MMOL/L — SIGNIFICANT CHANGE UP (ref 96–108)
CO2 SERPL-SCNC: 24 MMOL/L — SIGNIFICANT CHANGE UP (ref 22–31)
CREAT SERPL-MCNC: 1 MG/DL — SIGNIFICANT CHANGE UP (ref 0.5–1.3)
EGFR: 84 ML/MIN/1.73M2 — SIGNIFICANT CHANGE UP
EOSINOPHIL # BLD AUTO: 0.09 K/UL — SIGNIFICANT CHANGE UP (ref 0–0.5)
EOSINOPHIL NFR BLD AUTO: 1.5 % — SIGNIFICANT CHANGE UP (ref 0–6)
GLUCOSE BLDC GLUCOMTR-MCNC: 111 MG/DL — HIGH (ref 70–99)
GLUCOSE BLDC GLUCOMTR-MCNC: 122 MG/DL — HIGH (ref 70–99)
GLUCOSE BLDC GLUCOMTR-MCNC: 134 MG/DL — HIGH (ref 70–99)
GLUCOSE BLDC GLUCOMTR-MCNC: 144 MG/DL — HIGH (ref 70–99)
GLUCOSE SERPL-MCNC: 116 MG/DL — HIGH (ref 70–99)
HCT VFR BLD CALC: 38.5 % — LOW (ref 39–50)
HGB BLD-MCNC: 12.9 G/DL — LOW (ref 13–17)
IMM GRANULOCYTES NFR BLD AUTO: 0.3 % — SIGNIFICANT CHANGE UP (ref 0–1.5)
LYMPHOCYTES # BLD AUTO: 1.9 K/UL — SIGNIFICANT CHANGE UP (ref 1–3.3)
LYMPHOCYTES # BLD AUTO: 31.1 % — SIGNIFICANT CHANGE UP (ref 13–44)
MAGNESIUM SERPL-MCNC: 2.5 MG/DL — SIGNIFICANT CHANGE UP (ref 1.6–2.6)
MCHC RBC-ENTMCNC: 29.6 PG — SIGNIFICANT CHANGE UP (ref 27–34)
MCHC RBC-ENTMCNC: 33.5 GM/DL — SIGNIFICANT CHANGE UP (ref 32–36)
MCV RBC AUTO: 88.3 FL — SIGNIFICANT CHANGE UP (ref 80–100)
MONOCYTES # BLD AUTO: 0.47 K/UL — SIGNIFICANT CHANGE UP (ref 0–0.9)
MONOCYTES NFR BLD AUTO: 7.7 % — SIGNIFICANT CHANGE UP (ref 2–14)
NEUTROPHILS # BLD AUTO: 3.6 K/UL — SIGNIFICANT CHANGE UP (ref 1.8–7.4)
NEUTROPHILS NFR BLD AUTO: 58.9 % — SIGNIFICANT CHANGE UP (ref 43–77)
NRBC # BLD: 0 /100 WBCS — SIGNIFICANT CHANGE UP (ref 0–0)
PHOSPHATE SERPL-MCNC: 3.2 MG/DL — SIGNIFICANT CHANGE UP (ref 2.5–4.5)
PLATELET # BLD AUTO: 264 K/UL — SIGNIFICANT CHANGE UP (ref 150–400)
POTASSIUM SERPL-MCNC: 3.9 MMOL/L — SIGNIFICANT CHANGE UP (ref 3.5–5.3)
POTASSIUM SERPL-SCNC: 3.9 MMOL/L — SIGNIFICANT CHANGE UP (ref 3.5–5.3)
PROT SERPL-MCNC: 7.5 G/DL — SIGNIFICANT CHANGE UP (ref 6–8.3)
RBC # BLD: 4.36 M/UL — SIGNIFICANT CHANGE UP (ref 4.2–5.8)
RBC # FLD: 13.5 % — SIGNIFICANT CHANGE UP (ref 10.3–14.5)
SODIUM SERPL-SCNC: 141 MMOL/L — SIGNIFICANT CHANGE UP (ref 135–145)
WBC # BLD: 6.11 K/UL — SIGNIFICANT CHANGE UP (ref 3.8–10.5)
WBC # FLD AUTO: 6.11 K/UL — SIGNIFICANT CHANGE UP (ref 3.8–10.5)

## 2022-06-27 PROCEDURE — 99233 SBSQ HOSP IP/OBS HIGH 50: CPT | Mod: GC

## 2022-06-27 RX ORDER — FLUTICASONE PROPIONATE 50 MCG
1 SPRAY, SUSPENSION NASAL
Refills: 0 | Status: DISCONTINUED | OUTPATIENT
Start: 2022-06-27 | End: 2022-06-29

## 2022-06-27 RX ORDER — ENOXAPARIN SODIUM 100 MG/ML
40 INJECTION SUBCUTANEOUS EVERY 24 HOURS
Refills: 0 | Status: DISCONTINUED | OUTPATIENT
Start: 2022-06-27 | End: 2022-06-29

## 2022-06-27 RX ORDER — LISINOPRIL 2.5 MG/1
10 TABLET ORAL ONCE
Refills: 0 | Status: COMPLETED | OUTPATIENT
Start: 2022-06-27 | End: 2022-06-27

## 2022-06-27 RX ORDER — LISINOPRIL 2.5 MG/1
20 TABLET ORAL DAILY
Refills: 0 | Status: DISCONTINUED | OUTPATIENT
Start: 2022-06-28 | End: 2022-06-28

## 2022-06-27 RX ADMIN — LISINOPRIL 10 MILLIGRAM(S): 2.5 TABLET ORAL at 06:21

## 2022-06-27 RX ADMIN — SERTRALINE 50 MILLIGRAM(S): 25 TABLET, FILM COATED ORAL at 12:50

## 2022-06-27 RX ADMIN — ENOXAPARIN SODIUM 40 MILLIGRAM(S): 100 INJECTION SUBCUTANEOUS at 12:49

## 2022-06-27 RX ADMIN — Medication 0.1 MILLIGRAM(S): at 14:01

## 2022-06-27 RX ADMIN — SERTRALINE 100 MILLIGRAM(S): 25 TABLET, FILM COATED ORAL at 12:49

## 2022-06-27 RX ADMIN — ATORVASTATIN CALCIUM 40 MILLIGRAM(S): 80 TABLET, FILM COATED ORAL at 21:36

## 2022-06-27 RX ADMIN — Medication 81 MILLIGRAM(S): at 12:50

## 2022-06-27 RX ADMIN — AMLODIPINE BESYLATE 10 MILLIGRAM(S): 2.5 TABLET ORAL at 06:21

## 2022-06-27 RX ADMIN — Medication 0.1 MILLIGRAM(S): at 06:21

## 2022-06-27 RX ADMIN — Medication 600 MILLIGRAM(S): at 17:20

## 2022-06-27 RX ADMIN — CLOPIDOGREL BISULFATE 75 MILLIGRAM(S): 75 TABLET, FILM COATED ORAL at 12:50

## 2022-06-27 RX ADMIN — Medication 0.1 MILLIGRAM(S): at 21:36

## 2022-06-27 RX ADMIN — PANTOPRAZOLE SODIUM 40 MILLIGRAM(S): 20 TABLET, DELAYED RELEASE ORAL at 06:22

## 2022-06-27 RX ADMIN — PREGABALIN 1000 MICROGRAM(S): 225 CAPSULE ORAL at 14:01

## 2022-06-27 RX ADMIN — LISINOPRIL 10 MILLIGRAM(S): 2.5 TABLET ORAL at 12:49

## 2022-06-27 RX ADMIN — Medication 600 MILLIGRAM(S): at 06:21

## 2022-06-27 NOTE — CONSULT NOTE ADULT - ASSESSMENT
Patient is a 67yo Male with  CAD s/p 1 stent in March 2020, GERD, Anxiety, Depression, HTN, HLD with PSH of Anal fistula repair, s/p cholecystectomy and Left Hip Arthoplasty presents to the ED with complaints of occipital and frontal headache for two days. Pt a/w Hypertensive urgency. Nephrology consulted for secondary w/u for HTN.     1. Hypertensive urgency      [facility] NEPHROLOGY  Alexx Al M.D.  Kulwant Cr D.O.  Lori Serrano M.D.  Drea Stephens, MSN, ANP-C  [phone]    [address] [id number]   Patient is a 65yo Male with  CAD s/p 1 stent in March 2020, GERD, Anxiety, Depression, HTN, HLD with PSH of Anal fistula repair, s/p cholecystectomy and Left Hip Arthoplasty presents to the ED with complaints of occipital and frontal headache for two days. Pt a/w Hypertensive urgency. Nephrology consulted for secondary w/u for HTN.     1. Hypertensive urgency- BP improving on Lisinopril 20mg PO daily, Pt on Clonidine 0.1mg PO tid and Amlodipine 10mg PO daily. c/w low salt diet.   Secondary w/u for HTN with normal kidney function: TSH wnl. Check ebony and renin level. Check AM cortisol. Check plasma metanephrines to r/o pheo (low suspicion)  Recc outpt sleep study to r/o CLAUDE. Check CTA to r/o SERGIO. Monitor BP  2. CAD- s/p 1 stent. TTE in Feb'22 showed Elham and grade 1 DDF. c/w  ASA, plavix, and atorvastatin.  3. Anxiety- pt on Zoloft    [facility] NEPHROLOGY  Alexx Al M.D.  Kulwant Cr D.O.  Lori Serrano M.D.  Drea Stephens, MSN, ANP-C  [phone]    [address] [place] [address]

## 2022-06-27 NOTE — PROGRESS NOTE ADULT - SUBJECTIVE AND OBJECTIVE BOX
PGY-1 Progress Note discussed with attending    PAGER #: [[phone]] TILL 5:00 PM  PLEASE CONTACT ON CALL TEAM:  - On Call Team (Please refer to Lavell) FROM 5:00 PM - 8:30PM  - Nightfloat Team FROM 8:30 -7:30 AM    CHIEF COMPLAINT & BRIEF HOSPITAL COURSE:    INTERVAL HPI/OVERNIGHT EVENTS:   Patient seen and examined at bedside. Patient had headache overnight but he denies any symptoms this morning    REVIEW OF SYSTEMS:  CONSTITUTIONAL: No fever, weight loss, or fatigue  RESPIRATORY: No cough, wheezing, chills or hemoptysis; No shortness of breath  CARDIOVASCULAR: No chest pain, palpitations, dizziness, or leg swelling  GASTROINTESTINAL: No abdominal pain. No nausea, vomiting, or hematemesis; No diarrhea or constipation. No melena or hematochezia.  GENITOURINARY: No dysuria or hematuria, urinary frequency  NEUROLOGICAL: No headaches, memory loss, loss of strength, numbness, or tremors  SKIN: No itching, burning, rashes, or lesions     acetaminophen     Tablet .. 650 milliGRAM(s) Oral every 6 hours PRN  ALBUTerol    90 MICROgram(s) HFA Inhaler 2 Puff(s) Inhalation every 6 hours PRN  amLODIPine   Tablet 10 milliGRAM(s) Oral daily  aspirin enteric coated 81 milliGRAM(s) Oral daily  atorvastatin 40 milliGRAM(s) Oral at bedtime  cloNIDine 0.1 milliGRAM(s) Oral three times a day  clopidogrel Tablet 75 milliGRAM(s) Oral daily  cyanocobalamin 1000 MICROGram(s) Oral daily  enoxaparin Injectable 40 milliGRAM(s) SubCutaneous every 24 hours  gemfibrozil 600 milliGRAM(s) Oral two times a day  lisinopril 10 milliGRAM(s) Oral once  LORazepam     Tablet 1 milliGRAM(s) Oral at bedtime PRN  pantoprazole    Tablet 40 milliGRAM(s) Oral before breakfast  sertraline 100 milliGRAM(s) Oral daily  sertraline 50 milliGRAM(s) Oral daily      Vital Signs Last 24 Hrs  T(C): 37 (27 Jun 2022 11:07), Max: 37 (27 Jun 2022 11:07)  T(F): 98.6 (27 Jun 2022 11:07), Max: 98.6 (27 Jun 2022 11:07)  HR: 78 (27 Jun 2022 11:07) (60 - 78)  BP: 132/81 (27 Jun 2022 11:07) (132/81 - 176/98)  BP(mean): --  RR: 18 (27 Jun 2022 11:07) (17 - 18)  SpO2: 98% (27 Jun 2022 11:07) (93% - 99%)    PHYSICAL EXAMINATION:  GENERAL: NAD  HEAD:  Atraumatic, Normocephalic  EYES:  conjunctiva and sclera clear  NECK: Supple, No JVD, Normal thyroid  CHEST/LUNG: Clear to auscultation; No rales, rhonchi, wheezing, or rubs  HEART: Regular rate and rhythm; No murmurs, rubs, or gallops  ABDOMEN: Soft, Nontender, Nondistended; Bowel sounds present  NERVOUS SYSTEM:  Alert & Oriented X3,   EXTREMITIES:  2+ Peripheral Pulses, No clubbing, cyanosis, or edema  SKIN: warm dry                          12.9   6.11  )-----------( 264      ( 27 Jun 2022 08:17 )             38.5     06-27    141  |  108  |  17  ----------------------------<  116<H>  3.9   |  24  |  1.00    Ca    9.4      27 Jun 2022 08:17  Phos  3.2     06-27  Mg     2.5     06-27    TPro  7.5  /  Alb  3.9  /  TBili  0.6  /  DBili  x   /  AST  18  /  ALT  22  /  AlkPhos  85  06-27    LIVER FUNCTIONS - ( 27 Jun 2022 08:17 )  Alb: 3.9 g/dL / Pro: 7.5 g/dL / ALK PHOS: 85 U/L / ALT: 22 U/L DA / AST: 18 U/L / GGT: x                   CAPILLARY BLOOD GLUCOSE      RADIOLOGY & ADDITIONAL TESTS:

## 2022-06-27 NOTE — CONSULT NOTE ADULT - SUBJECTIVE AND OBJECTIVE BOX
[facility] NEPHROLOGY- CONSULTATION NOTE    Patient is a 67yo Male with  CAD s/p 1 stent in 2020, GERD, Anxiety, Depression, HTN, HLD with PSH of Anal fistula repair, s/p cholecystectomy and Left Hip Arthoplasty presents to the ED with complaints of occipital and frontal headache for two days. Pt a/w Hypertensive urgency. Nephrology consulted for secondary w/u for HTN.     Pt dx with HTN since . Pt has family h/o HTN (father). He denies any h/o smoking/ ETOH use or drug use. Denies any steroid or testosterone use.   Pt does snore. Pt c/o HA but denies any diaphoretic episodes.       PAST MEDICAL & SURGICAL HISTORY:  Hypertension      High cholesterol      Depression      Gall bladder stones      Cholelithiasis      Umbilical hernia      HLD (hyperlipidemia)      Low testosterone      Seasonal allergies      GERD (gastroesophageal reflux disease)      Phimosis      History of hip replacement, total, left  2016      History of sinus surgery      Anal fistula  h/o repair of anal fistula      S/P LASIK surgery of both eyes        No Known Allergies    Home Medications Reviewed  Hospital Medications:   MEDICATIONS  (STANDING):  amLODIPine   Tablet 10 milliGRAM(s) Oral daily  aspirin enteric coated 81 milliGRAM(s) Oral daily  atorvastatin 40 milliGRAM(s) Oral at bedtime  cloNIDine 0.1 milliGRAM(s) Oral three times a day  clopidogrel Tablet 75 milliGRAM(s) Oral daily  cyanocobalamin 1000 MICROGram(s) Oral daily  enoxaparin Injectable 40 milliGRAM(s) SubCutaneous every 24 hours  gemfibrozil 600 milliGRAM(s) Oral two times a day  pantoprazole    Tablet 40 milliGRAM(s) Oral before breakfast  sertraline 100 milliGRAM(s) Oral daily  sertraline 50 milliGRAM(s) Oral daily    SOCIAL HISTORY:  Denies ETOh, Smoking, or drug use  FAMILY HISTORY:  Family history of brain aneurysm (Sibling)    Family history of myocardial infarction (Father)    Family history of hypertension in father (Father)    Family history of stomach cancer (Sibling, Mother)    Family history of Alzheimer&#x27;s disease (Mother)        REVIEW OF SYSTEMS:  Gen: no changes in weight +HA resolved  HEENT: no rhinorrhea  Neck: no sore throat  Cards: no chest pain  Resp: no dyspnea  GI: no nausea or vomiting or diarrhea  : no dysuria or hematuria  Vascular: no LE edema  Derm: no rashes  Neuro: no numbness/tingling  All other review of systems is negative unless indicated above.    VITALS:  T(F): 97.5 (22 @ 20:02), Max: 98.6 (22 @ 11:07)  HR: 65 (22 @ 20:02)  BP: 149/60 (22 @ 20:02)  RR: 19 (22 @ 20:02)  SpO2: 96% (22 @ 20:02)  Wt(kg): --     @ 07:01  -   @ 07:00  --------------------------------------------------------  IN: 450 mL / OUT: 0 mL / NET: 450 mL     @ 07:01  -   @ 21:26  --------------------------------------------------------  IN: 725 mL / OUT: 0 mL / NET: 725 mL        PHYSICAL EXAM:  Gen: NAD, calm  HEENT: MMM  Neck: no JVD  Cards: RRR, +S1/S2, no M/G/R  Resp: CTA B/L  GI: soft, NT/ND, NABS  : no CVA tenderness  Extremities: no LE edema B/L  Derm: no rashes  Neuro: non-focal    LABS:      141  |  108  |  17  ----------------------------<  116<H>  3.9   |  24  |  1.00    Ca    9.4      2022 08:17  Phos  3.2       Mg     2.5         TPro  7.5  /  Alb  3.9  /  TBili  0.6  /  DBili      /  AST  18  /  ALT  22  /  AlkPhos  85      Creatinine Trend: 1.00 <--, 0.84 <--, 0.91 <--, 1.03 <--                        12.9   6.11  )-----------( 264      ( 2022 08:17 )             38.5     Urine Studies:  Urinalysis Basic - ( 2022 19:40 )    Color: Yellow / Appearance: Clear / S.010 / pH:   Gluc:  / Ketone: Negative  / Bili: Negative / Urobili: Negative   Blood:  / Protein: 30 mg/dL / Nitrite: Negative   Leuk Esterase: Negative / RBC: 0-2 /HPF / WBC 0-2 /HPF   Sq Epi:  / Non Sq Epi: Few /HPF / Bacteria: Few /HPF        RADIOLOGY & ADDITIONAL STUDIES:      < from: CT Head No Cont (22 @ 00:57) >    ACC: [id number] EXAM:  CT BRAIN                          PROCEDURE DATE:  2022      < end of copied text >  < from: CT Head No Cont (22 @ 00:57) >  IMPRESSION:    No acute intracranial hemorrhage, mass effect, or evidence of acute   vascular territorial infarction. If clinical symptoms persist or worsen,   more sensitive evaluation with brain MRI may be obtained, if no   contraindications exist.    --- End of Report ---      < end of copied text >

## 2022-06-27 NOTE — PROGRESS NOTE ADULT - PROBLEM SELECTOR PLAN 1
Pt p/w headaches  Vitals Hypertensive at 241/115mmHg  EKG showed NSR  Trops negative  CT head showed No acute intracranial hemorrhage, mass effect, or evidence of acute vascular territorial infarction  Creat 0.91  In ED was given amlodipine 10mg once   amlodipine, clonidine, lisinopril   monitor BPs Pt p/w headaches  Home meds = amlodipine 10mg, clonidine 0.1mg BID, lisinopril 10mg  Vitals Hypertensive at 241/115mmHg  EKG showed NSR  Trops negative  CT head showed No acute intracranial hemorrhage, mass effect, or evidence of acute vascular territorial infarction  Creat 0.91  - Currently, put on amlodipine 10mg, clonidine 0.1mg BID, lisinopril 10->20mg  - TSH 0.64  - R/o secondary causes of HTN  - Nephro Dr. Al consulted Pt p/w headaches  Home meds = amlodipine 10mg, clonidine 0.1mg BID, lisinopril 10mg  Vitals Hypertensive at 241/115mmHg  EKG showed NSR  Trops negative  CT head showed No acute intracranial hemorrhage, mass effect, or evidence of acute vascular territorial infarction  - States he averages ~140/80 at home  - Currently, put on amlodipine 10mg, clonidine 0.1mg BID, lisinopril 10->20mg  - TSH 0.64  - R/o secondary causes of HTN  - Nephro Dr. Al consulted Pt p/w headaches  Home meds = amlodipine 10mg, clonidine 0.1mg BID, lisinopril 10mg  Vitals Hypertensive at 241/115mmHg  EKG showed NSR  Trops negative  CT head showed No acute intracranial hemorrhage, mass effect, or evidence of acute vascular territorial infarction  - States he averages ~140/80 at home  - Currently, put on amlodipine 10mg, clonidine 0.1mg BID, lisinopril 10->20mg  - TSH 0.64  - R/o secondary causes of HTN  - Nephro Dr. Serrano consulted

## 2022-06-28 LAB
ALBUMIN SERPL ELPH-MCNC: 4.1 G/DL — SIGNIFICANT CHANGE UP (ref 3.5–5)
ALP SERPL-CCNC: 89 U/L — SIGNIFICANT CHANGE UP (ref 40–120)
ALT FLD-CCNC: 23 U/L DA — SIGNIFICANT CHANGE UP (ref 10–60)
ANION GAP SERPL CALC-SCNC: 9 MMOL/L — SIGNIFICANT CHANGE UP (ref 5–17)
AST SERPL-CCNC: 14 U/L — SIGNIFICANT CHANGE UP (ref 10–40)
BILIRUB SERPL-MCNC: 0.6 MG/DL — SIGNIFICANT CHANGE UP (ref 0.2–1.2)
BUN SERPL-MCNC: 19 MG/DL — HIGH (ref 7–18)
CALCIUM SERPL-MCNC: 9.3 MG/DL — SIGNIFICANT CHANGE UP (ref 8.4–10.5)
CHLORIDE SERPL-SCNC: 106 MMOL/L — SIGNIFICANT CHANGE UP (ref 96–108)
CHOLEST SERPL-MCNC: 129 MG/DL — SIGNIFICANT CHANGE UP
CO2 SERPL-SCNC: 24 MMOL/L — SIGNIFICANT CHANGE UP (ref 22–31)
CORTIS AM PEAK SERPL-MCNC: 4.8 UG/DL — LOW (ref 6–18.4)
CREAT SERPL-MCNC: 0.94 MG/DL — SIGNIFICANT CHANGE UP (ref 0.5–1.3)
EGFR: 90 ML/MIN/1.73M2 — SIGNIFICANT CHANGE UP
GLUCOSE SERPL-MCNC: 113 MG/DL — HIGH (ref 70–99)
HCT VFR BLD CALC: 37.3 % — LOW (ref 39–50)
HDLC SERPL-MCNC: 51 MG/DL — SIGNIFICANT CHANGE UP
HGB BLD-MCNC: 13 G/DL — SIGNIFICANT CHANGE UP (ref 13–17)
LIPID PNL WITH DIRECT LDL SERPL: 63 MG/DL — SIGNIFICANT CHANGE UP
MCHC RBC-ENTMCNC: 29.8 PG — SIGNIFICANT CHANGE UP (ref 27–34)
MCHC RBC-ENTMCNC: 34.9 GM/DL — SIGNIFICANT CHANGE UP (ref 32–36)
MCV RBC AUTO: 85.6 FL — SIGNIFICANT CHANGE UP (ref 80–100)
NON HDL CHOLESTEROL: 78 MG/DL — SIGNIFICANT CHANGE UP
NRBC # BLD: 0 /100 WBCS — SIGNIFICANT CHANGE UP (ref 0–0)
PLATELET # BLD AUTO: 274 K/UL — SIGNIFICANT CHANGE UP (ref 150–400)
POTASSIUM SERPL-MCNC: 3.8 MMOL/L — SIGNIFICANT CHANGE UP (ref 3.5–5.3)
POTASSIUM SERPL-SCNC: 3.8 MMOL/L — SIGNIFICANT CHANGE UP (ref 3.5–5.3)
PROT SERPL-MCNC: 7.7 G/DL — SIGNIFICANT CHANGE UP (ref 6–8.3)
RBC # BLD: 4.36 M/UL — SIGNIFICANT CHANGE UP (ref 4.2–5.8)
RBC # FLD: 13.2 % — SIGNIFICANT CHANGE UP (ref 10.3–14.5)
SODIUM SERPL-SCNC: 139 MMOL/L — SIGNIFICANT CHANGE UP (ref 135–145)
TRIGL SERPL-MCNC: 76 MG/DL — SIGNIFICANT CHANGE UP
WBC # BLD: 6.57 K/UL — SIGNIFICANT CHANGE UP (ref 3.8–10.5)
WBC # FLD AUTO: 6.57 K/UL — SIGNIFICANT CHANGE UP (ref 3.8–10.5)

## 2022-06-28 PROCEDURE — 99233 SBSQ HOSP IP/OBS HIGH 50: CPT | Mod: GC

## 2022-06-28 RX ORDER — LISINOPRIL 2.5 MG/1
20 TABLET ORAL ONCE
Refills: 0 | Status: COMPLETED | OUTPATIENT
Start: 2022-06-28 | End: 2022-06-28

## 2022-06-28 RX ORDER — LISINOPRIL 2.5 MG/1
40 TABLET ORAL DAILY
Refills: 0 | Status: DISCONTINUED | OUTPATIENT
Start: 2022-06-29 | End: 2022-06-29

## 2022-06-28 RX ADMIN — CLOPIDOGREL BISULFATE 75 MILLIGRAM(S): 75 TABLET, FILM COATED ORAL at 12:03

## 2022-06-28 RX ADMIN — LISINOPRIL 20 MILLIGRAM(S): 2.5 TABLET ORAL at 06:30

## 2022-06-28 RX ADMIN — Medication 81 MILLIGRAM(S): at 12:02

## 2022-06-28 RX ADMIN — AMLODIPINE BESYLATE 10 MILLIGRAM(S): 2.5 TABLET ORAL at 06:30

## 2022-06-28 RX ADMIN — LISINOPRIL 20 MILLIGRAM(S): 2.5 TABLET ORAL at 15:23

## 2022-06-28 RX ADMIN — Medication 600 MILLIGRAM(S): at 17:34

## 2022-06-28 RX ADMIN — PANTOPRAZOLE SODIUM 40 MILLIGRAM(S): 20 TABLET, DELAYED RELEASE ORAL at 06:30

## 2022-06-28 RX ADMIN — ATORVASTATIN CALCIUM 40 MILLIGRAM(S): 80 TABLET, FILM COATED ORAL at 21:30

## 2022-06-28 RX ADMIN — SERTRALINE 50 MILLIGRAM(S): 25 TABLET, FILM COATED ORAL at 12:03

## 2022-06-28 RX ADMIN — Medication 0.1 MILLIGRAM(S): at 06:30

## 2022-06-28 RX ADMIN — SERTRALINE 100 MILLIGRAM(S): 25 TABLET, FILM COATED ORAL at 12:03

## 2022-06-28 RX ADMIN — Medication 0.1 MILLIGRAM(S): at 21:29

## 2022-06-28 RX ADMIN — Medication 600 MILLIGRAM(S): at 06:31

## 2022-06-28 RX ADMIN — PREGABALIN 1000 MICROGRAM(S): 225 CAPSULE ORAL at 12:02

## 2022-06-28 RX ADMIN — ENOXAPARIN SODIUM 40 MILLIGRAM(S): 100 INJECTION SUBCUTANEOUS at 12:02

## 2022-06-28 RX ADMIN — Medication 0.1 MILLIGRAM(S): at 13:23

## 2022-06-28 NOTE — PROGRESS NOTE ADULT - PROBLEM SELECTOR PLAN 1
Pt p/w headaches  Home meds = amlodipine 10mg, clonidine 0.1mg BID, lisinopril 10mg  Vitals Hypertensive at 241/115mmHg  EKG showed NSR  Trops negative  CT head showed No acute intracranial hemorrhage, mass effect, or evidence of acute vascular territorial infarction  - States he averages ~140/80 at home  - Currently, put on amlodipine 10mg, clonidine 0.1mg BID, lisinopril 10->20mg  - TSH 0.64  - R/o secondary causes of HTN  - Nephro Dr. Serrano consulted Pt p/w headaches  Home meds = amlodipine 10mg, clonidine 0.1mg BID, lisinopril 10mg  Vitals Hypertensive at 241/115mmHg  EKG showed NSR  Trops negative  CT head showed No acute intracranial hemorrhage, mass effect, or evidence of acute vascular territorial infarction  - States he averages ~140/80 at home  - Currently, put on amlodipine 10mg, clonidine 0.1mg BID, lisinopril 10->40mg  - TSH 0.64  - R/o secondary causes of HTN  - Nephro Dr. Serrano consulted

## 2022-06-28 NOTE — PROGRESS NOTE ADULT - SUBJECTIVE AND OBJECTIVE BOX
PGY-1 Progress Note discussed with attending    PAGER #: [[phone]] TILL 5:00 PM  PLEASE CONTACT ON CALL TEAM:  - On Call Team (Please refer to Lavell) FROM 5:00 PM - 8:30PM  - Nightfloat Team FROM 8:30 -7:30 AM    CHIEF COMPLAINT & BRIEF HOSPITAL COURSE:    INTERVAL HPI/OVERNIGHT EVENTS:   Patient seen and examined at bedside. No acute events overnight.    REVIEW OF SYSTEMS:  CONSTITUTIONAL: No fever, weight loss, or fatigue  RESPIRATORY: No cough, wheezing, chills or hemoptysis; No shortness of breath  CARDIOVASCULAR: No chest pain, palpitations, dizziness, or leg swelling  GASTROINTESTINAL: No abdominal pain. No nausea, vomiting, or hematemesis; No diarrhea or constipation. No melena or hematochezia.  GENITOURINARY: No dysuria or hematuria, urinary frequency  NEUROLOGICAL: No headaches, memory loss, loss of strength, numbness, or tremors  SKIN: No itching, burning, rashes, or lesions     acetaminophen     Tablet .. 650 milliGRAM(s) Oral every 6 hours PRN  ALBUTerol    90 MICROgram(s) HFA Inhaler 2 Puff(s) Inhalation every 6 hours PRN  amLODIPine   Tablet 10 milliGRAM(s) Oral daily  aspirin enteric coated 81 milliGRAM(s) Oral daily  atorvastatin 40 milliGRAM(s) Oral at bedtime  cloNIDine 0.1 milliGRAM(s) Oral three times a day  clopidogrel Tablet 75 milliGRAM(s) Oral daily  cyanocobalamin 1000 MICROGram(s) Oral daily  enoxaparin Injectable 40 milliGRAM(s) SubCutaneous every 24 hours  fluticasone propionate 50 MICROgram(s)/spray Nasal Spray 1 Spray(s) Both Nostrils two times a day PRN  gemfibrozil 600 milliGRAM(s) Oral two times a day  lisinopril 20 milliGRAM(s) Oral daily  LORazepam     Tablet 1 milliGRAM(s) Oral at bedtime PRN  pantoprazole    Tablet 40 milliGRAM(s) Oral before breakfast  sertraline 100 milliGRAM(s) Oral daily  sertraline 50 milliGRAM(s) Oral daily      Vital Signs Last 24 Hrs  T(C): 36.7 (28 Jun 2022 07:58), Max: 37.1 (27 Jun 2022 23:48)  T(F): 98 (28 Jun 2022 07:58), Max: 98.7 (27 Jun 2022 23:48)  HR: 56 (28 Jun 2022 07:58) (56 - 80)  BP: 170/90 (28 Jun 2022 07:58) (132/81 - 170/90)  BP(mean): --  RR: 18 (28 Jun 2022 07:58) (18 - 19)  SpO2: 98% (28 Jun 2022 07:58) (95% - 100%)    PHYSICAL EXAMINATION:  GENERAL: NAD  HEAD:  Atraumatic, Normocephalic  EYES:  conjunctiva and sclera clear  NECK: Supple, No JVD, Normal thyroid  CHEST/LUNG: Clear to auscultation; No rales, rhonchi, wheezing, or rubs  HEART: Regular rate and rhythm; No murmurs, rubs, or gallops  ABDOMEN: Soft, Nontender, Nondistended; Bowel sounds present  NERVOUS SYSTEM:  Alert & Oriented X3,   EXTREMITIES:  2+ Peripheral Pulses, No clubbing, cyanosis, or edema  SKIN: warm dry                          13.0   6.57  )-----------( 274      ( 28 Jun 2022 07:47 )             37.3     06-28    139  |  106  |  19<H>  ----------------------------<  113<H>  3.8   |  24  |  0.94    Ca    9.3      28 Jun 2022 07:47  Phos  3.2     06-27  Mg     2.5     06-27    TPro  7.7  /  Alb  4.1  /  TBili  0.6  /  DBili  x   /  AST  14  /  ALT  23  /  AlkPhos  89  06-28    LIVER FUNCTIONS - ( 28 Jun 2022 07:47 )  Alb: 4.1 g/dL / Pro: 7.7 g/dL / ALK PHOS: 89 U/L / ALT: 23 U/L DA / AST: 14 U/L / GGT: x                   CAPILLARY BLOOD GLUCOSE      RADIOLOGY & ADDITIONAL TESTS:

## 2022-06-28 NOTE — PROGRESS NOTE ADULT - SUBJECTIVE AND OBJECTIVE BOX
[facility] NEPHROLOGY- PROGRESS NOTE    Patient is a 67yo Male with  CAD s/p 1 stent in 2020, GERD, Anxiety, Depression, HTN, HLD with PSH of Anal fistula repair, s/p cholecystectomy and Left Hip Arthoplasty presents to the ED with complaints of occipital and frontal headache for two days. Pt a/w Hypertensive urgency. Nephrology consulted for secondary w/u for HTN.       Hospital Medications: Medications reviewed.  REVIEW OF SYSTEMS:  CONSTITUTIONAL: No fevers or chills  RESPIRATORY: No shortness of breath  CARDIOVASCULAR: No chest pain.  GASTROINTESTINAL: No nausea, vomiting, diarrhea or abdominal pain.   VASCULAR: No bilateral lower extremity edema.     VITALS:  T(F): 97.6 (22 @ 11:05), Max: 98.7 (22 @ 23:48)  HR: 68 (22 @ 11:05)  BP: 172/85 (22 @ 11:05)  RR: 18 (22 @ 11:05)  SpO2: 97% (22 @ 11:05)  Wt(kg): --  Height (cm): 172.7 ( @ 09:32), 172.7 ( @ 22:14), 172.7 ( @ 21:40)  Weight (kg): 97.5 ( @ 09:32), 97.5 ( @ 22:14)  BMI (kg/m2): 32.7 ( @ 09:32), 32.7 ( @ 22:14)  BSA (m2): 2.11 ( @ 09:32), 2.11 ( @ 22:14)     @ 07:01  -   @ 07:00  --------------------------------------------------------  IN: 725 mL / OUT: 0 mL / NET: 725 mL      PHYSICAL EXAM:  Constitutional: NAD  Neurological: Awake and Alert  HEENT: anicteric sclera,   Respiratory: CTAB, no wheezes, rales or rhonchi  Cardiovascular: S1, S2, RRR  Gastrointestinal: BS+, soft, NT/ND  : No kauffman.  Extremities: No peripheral edema    LABS:      139  |  106  |  19<H>  ----------------------------<  113<H>  3.8   |  24  |  0.94    Ca    9.3      2022 07:47  Phos  3.2       Mg     2.5         TPro  7.7  /  Alb  4.1  /  TBili  0.6  /  DBili      /  AST  14  /  ALT  23  /  AlkPhos  89      Creatinine Trend: 0.94 <--, 1.00 <--, 0.84 <--, 0.91 <--, 1.03 <--                        13.0   6.57  )-----------( 274      ( 2022 07:47 )             37.3     Urine Studies:  Urinalysis Basic - ( 2022 19:40 )    Color: Yellow / Appearance: Clear / S.010 / pH:   Gluc:  / Ketone: Negative  / Bili: Negative / Urobili: Negative   Blood:  / Protein: 30 mg/dL / Nitrite: Negative   Leuk Esterase: Negative / RBC: 0-2 /HPF / WBC 0-2 /HPF   Sq Epi:  / Non Sq Epi: Few /HPF / Bacteria: Few /HPF        RADIOLOGY & ADDITIONAL STUDIES:

## 2022-06-29 ENCOUNTER — TRANSCRIPTION ENCOUNTER (OUTPATIENT)
Age: 67
End: 2022-06-29

## 2022-06-29 VITALS
SYSTOLIC BLOOD PRESSURE: 142 MMHG | RESPIRATION RATE: 18 BRPM | DIASTOLIC BLOOD PRESSURE: 74 MMHG | OXYGEN SATURATION: 98 % | TEMPERATURE: 98 F | HEART RATE: 82 BPM

## 2022-06-29 LAB
ALBUMIN SERPL ELPH-MCNC: 3.7 G/DL — SIGNIFICANT CHANGE UP (ref 3.5–5)
ALDOST SERPL-MCNC: 11.2 NG/DL — SIGNIFICANT CHANGE UP
ALP SERPL-CCNC: 84 U/L — SIGNIFICANT CHANGE UP (ref 40–120)
ALT FLD-CCNC: 20 U/L DA — SIGNIFICANT CHANGE UP (ref 10–60)
ANION GAP SERPL CALC-SCNC: 10 MMOL/L — SIGNIFICANT CHANGE UP (ref 5–17)
AST SERPL-CCNC: 13 U/L — SIGNIFICANT CHANGE UP (ref 10–40)
BILIRUB SERPL-MCNC: 0.5 MG/DL — SIGNIFICANT CHANGE UP (ref 0.2–1.2)
BUN SERPL-MCNC: 23 MG/DL — HIGH (ref 7–18)
CALCIUM SERPL-MCNC: 9.1 MG/DL — SIGNIFICANT CHANGE UP (ref 8.4–10.5)
CHLORIDE SERPL-SCNC: 107 MMOL/L — SIGNIFICANT CHANGE UP (ref 96–108)
CO2 SERPL-SCNC: 23 MMOL/L — SIGNIFICANT CHANGE UP (ref 22–31)
CREAT SERPL-MCNC: 0.92 MG/DL — SIGNIFICANT CHANGE UP (ref 0.5–1.3)
EGFR: 92 ML/MIN/1.73M2 — SIGNIFICANT CHANGE UP
GLUCOSE SERPL-MCNC: 115 MG/DL — HIGH (ref 70–99)
HCT VFR BLD CALC: 35.4 % — LOW (ref 39–50)
HGB BLD-MCNC: 12.2 G/DL — LOW (ref 13–17)
MAGNESIUM SERPL-MCNC: 2.5 MG/DL — SIGNIFICANT CHANGE UP (ref 1.6–2.6)
MCHC RBC-ENTMCNC: 29.8 PG — SIGNIFICANT CHANGE UP (ref 27–34)
MCHC RBC-ENTMCNC: 34.5 GM/DL — SIGNIFICANT CHANGE UP (ref 32–36)
MCV RBC AUTO: 86.3 FL — SIGNIFICANT CHANGE UP (ref 80–100)
NRBC # BLD: 0 /100 WBCS — SIGNIFICANT CHANGE UP (ref 0–0)
PHOSPHATE SERPL-MCNC: 3.1 MG/DL — SIGNIFICANT CHANGE UP (ref 2.5–4.5)
PLATELET # BLD AUTO: 258 K/UL — SIGNIFICANT CHANGE UP (ref 150–400)
POTASSIUM SERPL-MCNC: 3.7 MMOL/L — SIGNIFICANT CHANGE UP (ref 3.5–5.3)
POTASSIUM SERPL-SCNC: 3.7 MMOL/L — SIGNIFICANT CHANGE UP (ref 3.5–5.3)
PROT SERPL-MCNC: 7.3 G/DL — SIGNIFICANT CHANGE UP (ref 6–8.3)
RBC # BLD: 4.1 M/UL — LOW (ref 4.2–5.8)
RBC # FLD: 13.1 % — SIGNIFICANT CHANGE UP (ref 10.3–14.5)
RENIN DIRECT, PLASMA: <2.1 PG/ML — SIGNIFICANT CHANGE UP
SODIUM SERPL-SCNC: 140 MMOL/L — SIGNIFICANT CHANGE UP (ref 135–145)
WBC # BLD: 6.58 K/UL — SIGNIFICANT CHANGE UP (ref 3.8–10.5)
WBC # FLD AUTO: 6.58 K/UL — SIGNIFICANT CHANGE UP (ref 3.8–10.5)

## 2022-06-29 PROCEDURE — 36415 COLL VENOUS BLD VENIPUNCTURE: CPT

## 2022-06-29 PROCEDURE — 85652 RBC SED RATE AUTOMATED: CPT

## 2022-06-29 PROCEDURE — 80053 COMPREHEN METABOLIC PANEL: CPT

## 2022-06-29 PROCEDURE — 82962 GLUCOSE BLOOD TEST: CPT

## 2022-06-29 PROCEDURE — 84100 ASSAY OF PHOSPHORUS: CPT

## 2022-06-29 PROCEDURE — 83735 ASSAY OF MAGNESIUM: CPT

## 2022-06-29 PROCEDURE — 99285 EMERGENCY DEPT VISIT HI MDM: CPT | Mod: 25

## 2022-06-29 PROCEDURE — 80061 LIPID PANEL: CPT

## 2022-06-29 PROCEDURE — 84244 ASSAY OF RENIN: CPT

## 2022-06-29 PROCEDURE — 82533 TOTAL CORTISOL: CPT

## 2022-06-29 PROCEDURE — 84484 ASSAY OF TROPONIN QUANT: CPT

## 2022-06-29 PROCEDURE — 82550 ASSAY OF CK (CPK): CPT

## 2022-06-29 PROCEDURE — 85025 COMPLETE CBC W/AUTO DIFF WBC: CPT

## 2022-06-29 PROCEDURE — 99239 HOSP IP/OBS DSCHRG MGMT >30: CPT | Mod: GC

## 2022-06-29 PROCEDURE — 83835 ASSAY OF METANEPHRINES: CPT

## 2022-06-29 PROCEDURE — 93005 ELECTROCARDIOGRAM TRACING: CPT

## 2022-06-29 PROCEDURE — 85027 COMPLETE CBC AUTOMATED: CPT

## 2022-06-29 PROCEDURE — 83036 HEMOGLOBIN GLYCOSYLATED A1C: CPT

## 2022-06-29 PROCEDURE — 81001 URINALYSIS AUTO W/SCOPE: CPT

## 2022-06-29 PROCEDURE — 84443 ASSAY THYROID STIM HORMONE: CPT

## 2022-06-29 PROCEDURE — 71045 X-RAY EXAM CHEST 1 VIEW: CPT

## 2022-06-29 PROCEDURE — 87635 SARS-COV-2 COVID-19 AMP PRB: CPT

## 2022-06-29 PROCEDURE — 82088 ASSAY OF ALDOSTERONE: CPT

## 2022-06-29 RX ORDER — ASPIRIN/CALCIUM CARB/MAGNESIUM 324 MG
1 TABLET ORAL
Qty: 0 | Refills: 3 | DISCHARGE

## 2022-06-29 RX ORDER — PANTOPRAZOLE SODIUM 20 MG/1
1 TABLET, DELAYED RELEASE ORAL
Qty: 30 | Refills: 0
Start: 2022-06-29 | End: 2022-07-28

## 2022-06-29 RX ORDER — SERTRALINE 25 MG/1
1 TABLET, FILM COATED ORAL
Qty: 0 | Refills: 4 | DISCHARGE

## 2022-06-29 RX ORDER — AMLODIPINE BESYLATE 2.5 MG/1
0 TABLET ORAL
Qty: 0 | Refills: 1 | DISCHARGE

## 2022-06-29 RX ORDER — AMLODIPINE BESYLATE 2.5 MG/1
1 TABLET ORAL
Qty: 0 | Refills: 1 | DISCHARGE

## 2022-06-29 RX ORDER — SERTRALINE 25 MG/1
0 TABLET, FILM COATED ORAL
Qty: 0 | Refills: 4 | DISCHARGE

## 2022-06-29 RX ORDER — ASPIRIN/CALCIUM CARB/MAGNESIUM 324 MG
0 TABLET ORAL
Qty: 0 | Refills: 3 | DISCHARGE

## 2022-06-29 RX ORDER — ALBUTEROL 90 UG/1
2 AEROSOL, METERED ORAL
Qty: 0 | Refills: 0 | DISCHARGE

## 2022-06-29 RX ORDER — CLOPIDOGREL BISULFATE 75 MG/1
1 TABLET, FILM COATED ORAL
Qty: 0 | Refills: 3 | DISCHARGE

## 2022-06-29 RX ORDER — CLOPIDOGREL BISULFATE 75 MG/1
0 TABLET, FILM COATED ORAL
Qty: 0 | Refills: 3 | DISCHARGE

## 2022-06-29 RX ORDER — LISINOPRIL 2.5 MG/1
1 TABLET ORAL
Qty: 30 | Refills: 0
Start: 2022-06-29 | End: 2022-07-28

## 2022-06-29 RX ORDER — TEMAZEPAM 15 MG/1
1 CAPSULE ORAL
Qty: 0 | Refills: 0 | DISCHARGE

## 2022-06-29 RX ORDER — ALBUTEROL 90 UG/1
0 AEROSOL, METERED ORAL
Qty: 0 | Refills: 0 | DISCHARGE

## 2022-06-29 RX ORDER — TEMAZEPAM 15 MG/1
0 CAPSULE ORAL
Qty: 0 | Refills: 0 | DISCHARGE

## 2022-06-29 RX ADMIN — Medication 0.1 MILLIGRAM(S): at 13:08

## 2022-06-29 RX ADMIN — PREGABALIN 1000 MICROGRAM(S): 225 CAPSULE ORAL at 11:38

## 2022-06-29 RX ADMIN — PANTOPRAZOLE SODIUM 40 MILLIGRAM(S): 20 TABLET, DELAYED RELEASE ORAL at 05:39

## 2022-06-29 RX ADMIN — Medication 600 MILLIGRAM(S): at 05:40

## 2022-06-29 RX ADMIN — ENOXAPARIN SODIUM 40 MILLIGRAM(S): 100 INJECTION SUBCUTANEOUS at 11:38

## 2022-06-29 RX ADMIN — Medication 81 MILLIGRAM(S): at 11:38

## 2022-06-29 RX ADMIN — Medication 0.1 MILLIGRAM(S): at 05:39

## 2022-06-29 RX ADMIN — CLOPIDOGREL BISULFATE 75 MILLIGRAM(S): 75 TABLET, FILM COATED ORAL at 11:38

## 2022-06-29 RX ADMIN — SERTRALINE 100 MILLIGRAM(S): 25 TABLET, FILM COATED ORAL at 11:38

## 2022-06-29 RX ADMIN — AMLODIPINE BESYLATE 10 MILLIGRAM(S): 2.5 TABLET ORAL at 05:39

## 2022-06-29 RX ADMIN — SERTRALINE 50 MILLIGRAM(S): 25 TABLET, FILM COATED ORAL at 11:38

## 2022-06-29 RX ADMIN — LISINOPRIL 40 MILLIGRAM(S): 2.5 TABLET ORAL at 05:40

## 2022-06-29 NOTE — PROGRESS NOTE ADULT - PROBLEM SELECTOR PLAN 3
Pt has h/o anxiety and depression  started on home meds of ativan, sertraline

## 2022-06-29 NOTE — DISCHARGE NOTE PROVIDER - ATTENDING DISCHARGE PHYSICAL EXAMINATION:
PHYSICAL EXAM:  GENERAL: NAD, well-developed, walking around the room  HEAD:  Atraumatic, Normocephalic  EYES: EOMI, PERRLA, conjunctiva and sclera clear  NECK: Supple, No JVD  CHEST/LUNG: Clear to auscultation bilaterally; No wheeze, rhonchi, rales  HEART: Regular rate and rhythm; No murmurs, rubs, or gallops  ABDOMEN: Soft, Nontender, Nondistended; Bowel sounds present  EXTREMITIES:  2+ Peripheral Pulses, No clubbing, cyanosis, or edema  PSYCH: AAOx3, calm, cooperative  NEUROLOGY: non-focal  SKIN: No rashes or lesions

## 2022-06-29 NOTE — DISCHARGE NOTE PROVIDER - NSDCMRMEDTOKEN_GEN_ALL_CORE_FT
ALBUTEROL HFA 90 MCG INHALER: 2 puff(s) inhaled 2 times a day, As Needed  AMLODIPINE BESYLATE 10 MG TAB: 1 each orally once a day  ASPIRIN EC 81 MG TABLET: 1 each orally once a day  atorvastatin 40 mg oral tablet: 1 tab(s) orally once a day (at bedtime)   CLONIDINE HCL 0.1 MG TABLET: 1 each orally 2 times a day  CLOPIDOGREL 75 MG TABLET: 1 each orally once a day  cyanocobalamin 1000 mcg oral tablet: 1 tab(s) orally once a day  gemfibrozil 600 mg oral tablet: 1 tab(s) orally 2 times a day  lisinopril 40 mg oral tablet: 1 tab(s) orally once a day  LORAZEPAM 1 MG TABLET: 1 each orally once a day, As Needed  Omega-3 oral capsule: 1 cap(s) orally once a day  pantoprazole 40 mg oral delayed release tablet: 1 tab(s) orally once a day (before a meal)  SERTRALINE  MG TABLET: 1 each orally once a day  SERTRALINE HCL 50 MG TABLET: 1 each orally once a day in addition to the 100mg  TEMAZEPAM 15 MG CAPSULE: 1 each orally once a day (at bedtime), As Needed  VITAMIN D2 1.25MG(50,000 UNIT): TAKE 1 CAPSULE BY MOUTH WEEKLY FOR 30 DAYS.   ALBUTEROL HFA 90 MCG INHALER: 2 puff(s) inhaled 2 times a day, As Needed  AMLODIPINE BESYLATE 10 MG TAB: 1 each orally once a day  ASPIRIN EC 81 MG TABLET: 1 each orally once a day  atorvastatin 40 mg oral tablet: 1 tab(s) orally once a day (at bedtime)   CLONIDINE HCL 0.1 MG TABLET: 1 each orally 2 times a day  CLOPIDOGREL 75 MG TABLET: 1 each orally once a day  cyanocobalamin 1000 mcg oral tablet: 1 tab(s) orally once a day  gemfibrozil 600 mg oral tablet: 1 tab(s) orally 2 times a day  lisinopril 40 mg oral tablet: 1 tab(s) orally once a day  LORAZEPAM 1 MG TABLET: 1 each orally once a day, As Needed  Omega-3 oral capsule: 1 cap(s) orally once a day  pantoprazole 40 mg oral delayed release tablet: 1 tab(s) orally once a day (before a meal)  pantoprazole 40 mg oral delayed release tablet: 1 tab(s) orally once a day   SERTRALINE  MG TABLET: 1 each orally once a day  SERTRALINE HCL 50 MG TABLET: 1 each orally once a day in addition to the 100mg  TEMAZEPAM 15 MG CAPSULE: 1 each orally once a day (at bedtime), As Needed  VITAMIN D2 1.25MG(50,000 UNIT): TAKE 1 CAPSULE BY MOUTH WEEKLY FOR 30 DAYS.

## 2022-06-29 NOTE — PROGRESS NOTE ADULT - SUBJECTIVE AND OBJECTIVE BOX
[facility] NEPHROLOGY- PROGRESS NOTE    Patient is a 65yo Male with  CAD s/p 1 stent in 2020, GERD, Anxiety, Depression, HTN, HLD with PSH of Anal fistula repair, s/p cholecystectomy and Left Hip Arthoplasty presents to the ED with complaints of occipital and frontal headache for two days. Pt a/w Hypertensive urgency. Nephrology consulted for secondary w/u for HTN.       Hospital Medications: Medications reviewed.  REVIEW OF SYSTEMS:  CONSTITUTIONAL: No fevers or chills  RESPIRATORY: No shortness of breath  CARDIOVASCULAR: No chest pain.  GASTROINTESTINAL: No nausea, vomiting, diarrhea or abdominal pain.   VASCULAR: No bilateral lower extremity edema.     VITALS:  T(F): 97.7 (22 @ 11:05), Max: 98 (22 @ 20:30)  HR: 82 (22 @ 11:05)  BP: 142/74 (22 @ 11:05)  RR: 18 (22 @ 11:05)  SpO2: 98% (22 @ 11:05)  Wt(kg): --     @ 07:01  -   @ 07:00  --------------------------------------------------------  IN: 220 mL / OUT: 0 mL / NET: 220 mL     @ 07:01  -   @ 14:31  --------------------------------------------------------  IN: 450 mL / OUT: 0 mL / NET: 450 mL        PHYSICAL EXAM:  Constitutional: NAD  Neurological: Awake and Alert  HEENT: anicteric sclera,   Respiratory: CTAB, no wheezes, rales or rhonchi  Cardiovascular: S1, S2, RRR  Gastrointestinal: BS+, soft, NT/ND  : No kauffman.  Extremities: No peripheral edema    LABS:      140  |  107  |  23<H>  ----------------------------<  115<H>  3.7   |  23  |  0.92    Ca    9.1      2022 07:05  Phos  3.1       Mg     2.5         TPro  7.3  /  Alb  3.7  /  TBili  0.5  /  DBili      /  AST  13  /  ALT  20  /  AlkPhos  84      Creatinine Trend: 0.92 <--, 0.94 <--, 1.00 <--, 0.84 <--, 0.91 <--, 1.03 <--                        12.2   6.58  )-----------( 258      ( 2022 07:05 )             35.4     Urine Studies:  Urinalysis Basic - ( 2022 19:40 )    Color: Yellow / Appearance: Clear / S.010 / pH:   Gluc:  / Ketone: Negative  / Bili: Negative / Urobili: Negative   Blood:  / Protein: 30 mg/dL / Nitrite: Negative   Leuk Esterase: Negative / RBC: 0-2 /HPF / WBC 0-2 /HPF   Sq Epi:  / Non Sq Epi: Few /HPF / Bacteria: Few /HPF

## 2022-06-29 NOTE — DISCHARGE NOTE PROVIDER - NSDCFUSCHEDAPPT_GEN_ALL_CORE_FT
[facility] Phys[facility]  Cardio [id number]  Scheduled Appointment: 09/07/2022

## 2022-06-29 NOTE — DISCHARGE NOTE PROVIDER - NSDCCPCAREPLAN_GEN_ALL_CORE_FT
PRINCIPAL DISCHARGE DIAGNOSIS  Diagnosis: Hypertensive urgency  Assessment and Plan of Treatment: You came in with headaches. you were admitted for hypertensive urgency. CT head showed no acute intracranial hemorrhage, mass effect, or evidence of acute vascular territorial infarction. Blood pressure on admission was 241/115. You said you average ~140/80 at home. EKG was normal sinus rhythm. Troponin was negative. TSH was 0.64. Home meds amlodipine 10mg once a day, clonidine 0.1mg twice a day, lisinopril 10mg once a day were continued initially. Lisinopril was increased to 40mg daily. Nephro Dr. Serrano was consulted to rule out secondary causes of Hypertension. Urine metanephrine, serum aldosterone, serum renin were sent. //// You are to have outpatient follow up w/ Dr. Serrano to check results within 2 weeks after discharge. We included her contact information in this packet. /// ALSO, TAKE AMLODIPINE 10MG ONCE A DAY, CLONIDINE 0.1MG TWICE A DAY. INCREASE YOUR LISINOPRIL 10MG TO 40MG ONCE A DAY.  lisinopril 40mg from your pharmacy. /// Please follow up with your primary care doctor 1 week after discharge to get blood test(complete metabolic panel) to monitor Creatinine and Potassium in your blood, since we increased your lisinopril      SECONDARY DISCHARGE DIAGNOSES  Diagnosis: CAD (coronary artery disease)  Assessment and Plan of Treatment: You had history of CAD, with 1 stent. Echo from 02/2022 showed normal ejection fraction and grade 1 diastolic dysfunction. Your home meds Aspirin, plavix, and atorvastatin were continued. Continue home med after discharge. Follow up with your primary care doctor 1 weeks after discharge.      Diagnosis: Anxiety with depression  Assessment and Plan of Treatment: You had history of anxiety and depression. Home ativan and sertraline were continued. Please continue home med after discharge.

## 2022-06-29 NOTE — PROGRESS NOTE ADULT - ASSESSMENT
Patient is a 65yo Male with  CAD s/p 1 stent in March 2020, GERD, Anxiety, Depression, HTN, HLD with PSH of Anal fistula repair, s/p cholecystectomy and Left Hip Arthoplasty presents to the ED with complaints of occipital and frontal headache for two days. Pt a/w Hypertensive urgency. Nephrology consulted for secondary w/u for HTN.     1. Hypertensive urgency- BP improving. Agree with increase Lisinopril to 40mg PO daily. Pt on Clonidine 0.1mg PO tid and Amlodipine 10mg PO daily. c/w low salt diet.   Secondary w/u for HTN with normal kidney function: TSH wnl. Roderick 11.2 with low cortisol. Recc to repeat AM coritsol and refer to Endo. Will f/u renin level, & plasma metanephrines to r/o pheo (low suspicion)  Recc outpt sleep study to r/o CLAUDE. Check CTA to r/o SERGIO. Monitor BP  2. CAD- s/p 1 stent. TTE in Feb'22 showed Elham and grade 1 DDF. c/w  ASA, plavix, and atorvastatin.  3. Anxiety- pt on Zoloft    [facility] NEPHROLOGY  Alexx Al M.D.  Kulwant Cr D.O.  Lori Serrano M.D.  Drea Stephens, MSN, ANP-C  [phone]    [address] [place] [address]  
Patient is a 67yo Male with  CAD s/p 1 stent in March 2020, GERD, Anxiety, Depression, HTN, HLD with PSH of Anal fistula repair, s/p cholecystectomy and Left Hip Arthoplasty presents to the ED with complaints of occipital and frontal headache for two days. Pt a/w Hypertensive urgency. Nephrology consulted for secondary w/u for HTN.     1. Hypertensive urgency- BP elevated; pt now on Lisinopril 20mg PO daily (1st dose today), titrate as needed. Pt on Clonidine 0.1mg PO tid and Amlodipine 10mg PO daily. c/w low salt diet. W  Secondary w/u for HTN with normal kidney function: TSH wnl. Will f/u ebony and renin level, AM cortisol & plasma metanephrines to r/o pheo (low suspicion)  Recc outpt sleep study to r/o CLAUDE. Check CTA to r/o SERGIO. Monitor BP  2. CAD- s/p 1 stent. TTE in Feb'22 showed Elham and grade 1 DDF. c/w  ASA, plavix, and atorvastatin.  3. Anxiety- pt on Zoloft    [facility] NEPHROLOGY  Alexx Al M.D.  Kulwant Cr D.O.  Lori Serrano M.D.  Drea Stephens, MSN, ANP-C  [phone]    [address] [place], [place] [id number]  
Pt is a 66 year old male with a PMH of CAD s/p 1 stent in March 2020, GERD, Anxiety, Depression, HTN, HLD with PSH of Anal fistula repair, s/p cholecystectomy and Left Hip Arthoplasty present to the ED with complaints of occipital and frontal headache and high blood pressure. Admitted for HTN urgency. 

## 2022-06-29 NOTE — PROGRESS NOTE ADULT - SUBJECTIVE AND OBJECTIVE BOX
PGY-1 Progress Note discussed with attending    PAGER #: [[phone]] TILL 5:00 PM  PLEASE CONTACT ON CALL TEAM:  - On Call Team (Please refer to Lavell) FROM 5:00 PM - 8:30PM  - Nightfloat Team FROM 8:30 -7:30 AM    CHIEF COMPLAINT & BRIEF HOSPITAL COURSE:    INTERVAL HPI/OVERNIGHT EVENTS:   Patient seen and examined at bedside. No acute events overnight.    REVIEW OF SYSTEMS:  CONSTITUTIONAL: No fever, weight loss, or fatigue  RESPIRATORY: No cough, wheezing, chills or hemoptysis; No shortness of breath  CARDIOVASCULAR: No chest pain, palpitations, dizziness, or leg swelling  GASTROINTESTINAL: No abdominal pain. No nausea, vomiting, or hematemesis; No diarrhea or constipation. No melena or hematochezia.  GENITOURINARY: No dysuria or hematuria, urinary frequency  NEUROLOGICAL: No headaches, memory loss, loss of strength, numbness, or tremors  SKIN: No itching, burning, rashes, or lesions     acetaminophen     Tablet .. 650 milliGRAM(s) Oral every 6 hours PRN  ALBUTerol    90 MICROgram(s) HFA Inhaler 2 Puff(s) Inhalation every 6 hours PRN  amLODIPine   Tablet 10 milliGRAM(s) Oral daily  aspirin enteric coated 81 milliGRAM(s) Oral daily  atorvastatin 40 milliGRAM(s) Oral at bedtime  cloNIDine 0.1 milliGRAM(s) Oral three times a day  clopidogrel Tablet 75 milliGRAM(s) Oral daily  cyanocobalamin 1000 MICROGram(s) Oral daily  enoxaparin Injectable 40 milliGRAM(s) SubCutaneous every 24 hours  fluticasone propionate 50 MICROgram(s)/spray Nasal Spray 1 Spray(s) Both Nostrils two times a day PRN  gemfibrozil 600 milliGRAM(s) Oral two times a day  lisinopril 40 milliGRAM(s) Oral daily  LORazepam     Tablet 1 milliGRAM(s) Oral at bedtime PRN  pantoprazole    Tablet 40 milliGRAM(s) Oral before breakfast  sertraline 100 milliGRAM(s) Oral daily  sertraline 50 milliGRAM(s) Oral daily      Vital Signs Last 24 Hrs  T(C): 36.5 (29 Jun 2022 11:05), Max: 36.7 (28 Jun 2022 20:30)  T(F): 97.7 (29 Jun 2022 11:05), Max: 98 (28 Jun 2022 20:30)  HR: 82 (29 Jun 2022 11:05) (55 - 82)  BP: 142/74 (29 Jun 2022 11:05) (140/66 - 154/76)  BP(mean): --  RR: 18 (29 Jun 2022 11:05) (18 - 19)  SpO2: 98% (29 Jun 2022 11:05) (97% - 99%)    PHYSICAL EXAMINATION:  GENERAL: NAD  HEAD:  Atraumatic, Normocephalic  EYES:  conjunctiva and sclera clear  NECK: Supple, No JVD, Normal thyroid  CHEST/LUNG: Clear to auscultation; No rales, rhonchi, wheezing, or rubs  HEART: Regular rate and rhythm; No murmurs, rubs, or gallops  ABDOMEN: Soft, Nontender, Nondistended; Bowel sounds present  NERVOUS SYSTEM:  Alert & Oriented X3,   EXTREMITIES:  2+ Peripheral Pulses, No clubbing, cyanosis, or edema  SKIN: warm dry                          12.2   6.58  )-----------( 258      ( 29 Jun 2022 07:05 )             35.4     06-29    140  |  107  |  23<H>  ----------------------------<  115<H>  3.7   |  23  |  0.92    Ca    9.1      29 Jun 2022 07:05  Phos  3.1     06-29  Mg     2.5     06-29    TPro  7.3  /  Alb  3.7  /  TBili  0.5  /  DBili  x   /  AST  13  /  ALT  20  /  AlkPhos  84  06-29    LIVER FUNCTIONS - ( 29 Jun 2022 07:05 )  Alb: 3.7 g/dL / Pro: 7.3 g/dL / ALK PHOS: 84 U/L / ALT: 20 U/L DA / AST: 13 U/L / GGT: x                   CAPILLARY BLOOD GLUCOSE      RADIOLOGY & ADDITIONAL TESTS:           PGY-1 Progress Note discussed with attending    PAGER #: [[phone]] TILL 5:00 PM  PLEASE CONTACT ON CALL TEAM:  - On Call Team (Please refer to Lavell) FROM 5:00 PM - 8:30PM  - Nightfloat Team FROM 8:30 -7:30 AM    CHIEF COMPLAINT & BRIEF HOSPITAL COURSE:    INTERVAL HPI/OVERNIGHT EVENTS:   Patient seen and examined at bedside. No acute events overnight. Patient denies any complaints.    REVIEW OF SYSTEMS:  CONSTITUTIONAL: No fever, weight loss, or fatigue  RESPIRATORY: No cough, wheezing, chills or hemoptysis; No shortness of breath  CARDIOVASCULAR: No chest pain, palpitations, dizziness, or leg swelling  GASTROINTESTINAL: No abdominal pain. No nausea, vomiting, or hematemesis; No diarrhea or constipation. No melena or hematochezia.  GENITOURINARY: No dysuria or hematuria, urinary frequency  NEUROLOGICAL: No headaches, memory loss, loss of strength, numbness, or tremors  SKIN: No itching, burning, rashes, or lesions     acetaminophen     Tablet .. 650 milliGRAM(s) Oral every 6 hours PRN  ALBUTerol    90 MICROgram(s) HFA Inhaler 2 Puff(s) Inhalation every 6 hours PRN  amLODIPine   Tablet 10 milliGRAM(s) Oral daily  aspirin enteric coated 81 milliGRAM(s) Oral daily  atorvastatin 40 milliGRAM(s) Oral at bedtime  cloNIDine 0.1 milliGRAM(s) Oral three times a day  clopidogrel Tablet 75 milliGRAM(s) Oral daily  cyanocobalamin 1000 MICROGram(s) Oral daily  enoxaparin Injectable 40 milliGRAM(s) SubCutaneous every 24 hours  fluticasone propionate 50 MICROgram(s)/spray Nasal Spray 1 Spray(s) Both Nostrils two times a day PRN  gemfibrozil 600 milliGRAM(s) Oral two times a day  lisinopril 40 milliGRAM(s) Oral daily  LORazepam     Tablet 1 milliGRAM(s) Oral at bedtime PRN  pantoprazole    Tablet 40 milliGRAM(s) Oral before breakfast  sertraline 100 milliGRAM(s) Oral daily  sertraline 50 milliGRAM(s) Oral daily      Vital Signs Last 24 Hrs  T(C): 36.5 (29 Jun 2022 11:05), Max: 36.7 (28 Jun 2022 20:30)  T(F): 97.7 (29 Jun 2022 11:05), Max: 98 (28 Jun 2022 20:30)  HR: 82 (29 Jun 2022 11:05) (55 - 82)  BP: 142/74 (29 Jun 2022 11:05) (140/66 - 154/76)  BP(mean): --  RR: 18 (29 Jun 2022 11:05) (18 - 19)  SpO2: 98% (29 Jun 2022 11:05) (97% - 99%)    PHYSICAL EXAMINATION:  GENERAL: NAD  HEAD:  Atraumatic, Normocephalic  EYES:  conjunctiva and sclera clear  NECK: Supple, No JVD, Normal thyroid  CHEST/LUNG: Clear to auscultation; No rales, rhonchi, wheezing, or rubs  HEART: Regular rate and rhythm; No murmurs, rubs, or gallops  ABDOMEN: Soft, Nontender, Nondistended; Bowel sounds present  NERVOUS SYSTEM:  Alert & Oriented X3,   EXTREMITIES:  2+ Peripheral Pulses, No clubbing, cyanosis, or edema  SKIN: warm dry                          12.2   6.58  )-----------( 258      ( 29 Jun 2022 07:05 )             35.4     06-29    140  |  107  |  23<H>  ----------------------------<  115<H>  3.7   |  23  |  0.92    Ca    9.1      29 Jun 2022 07:05  Phos  3.1     06-29  Mg     2.5     06-29    TPro  7.3  /  Alb  3.7  /  TBili  0.5  /  DBili  x   /  AST  13  /  ALT  20  /  AlkPhos  84  06-29    LIVER FUNCTIONS - ( 29 Jun 2022 07:05 )  Alb: 3.7 g/dL / Pro: 7.3 g/dL / ALK PHOS: 84 U/L / ALT: 20 U/L DA / AST: 13 U/L / GGT: x                   CAPILLARY BLOOD GLUCOSE      RADIOLOGY & ADDITIONAL TESTS:

## 2022-06-29 NOTE — DISCHARGE NOTE PROVIDER - HOSPITAL COURSE
Vaccine Information Sheet, \"Influenza - Inactivated\"  given to Martir Galeana, or parent/legal guardian of  Martir Galeana and verbalized understanding. Patient responses:    Have you ever had a reaction to a flu vaccine? No  Do you have any current illness? No  Have you ever had [place]illian [place] Syndrome? No  Do you have a serious allergy to any of the follow: Neomycin, Polymyxin, Thimerosal, eggs or egg products? No    Flu vaccine given per order. Please see immunization tab. Risks and benefits explained. Current VIS given. Pt is a 66 year old male with a PMH of CAD s/p 1 stent in March 2020, GERD, Anxiety, Depression, HTN, HLD with PSH of Anal fistula repair, s/p cholecystectomy and Left Hip Arthoplasty present to the ED with complaints of occipital and frontal headache and high blood pressure. Admitted for HTN urgency.     Patient came in with headaches. CT head showed no acute intracranial hemorrhage, mass effect, or evidence of acute vascular territorial infarction. Blood pressure on admission was 241/115. Patient says he averages ~140/80 at home. EKG was normal sinus rhythm. Troponin was negative. TSH was 0.64. Home meds amlodipine 10mg, clonidine 0.1mg BID, lisinopril 10mg were continued initially. Lisinopril was increased to 40mg daily. Nephro Dr. Serrano was consulted to rule out secondary causes of HTN. Urine metanephrine, serum aldosterone, serum renin were sent. Patient is to have outpatient follow up w/ Dr. Serrano to check results.    Patient had history of CAD, with 1 stent. Echo from 02/2022 showed normal ejection fraction and grade 1 diastolic dysfunction. Patient's home meds Aspirin, plavix, and atorvastatin were continued.    Patient had history of anxiety and depression. Home ativan and sertraline were continued.    Patient is stable for discharge. Patient has been advised to follow up as outpatient. Case has been discussed with the attending. Pt is a 66 year old male with a PMH of CAD s/p 1 stent in March 2020, GERD, Anxiety, Depression, HTN, HLD with PSH of Anal fistula repair, s/p cholecystectomy and Left Hip Arthoplasty present to the ED with complaints of occipital and frontal headache and high blood pressure. Admitted for HTN urgency.     Patient came in with headaches. CT head showed no acute intracranial hemorrhage, mass effect, or evidence of acute vascular territorial infarction. Blood pressure on admission was 241/115. Patient says he averages ~140/80 at home. EKG was normal sinus rhythm. Troponin was negative. TSH was 0.64. Home meds amlodipine 10mg, clonidine 0.1mg BID, lisinopril 10mg were continued initially. Lisinopril was increased to 40mg daily with improvement of the SBP back to the 140's-150's. Nephro Dr. Serrano was consulted to rule out secondary causes of HTN. Urine metanephrine, serum aldosterone, serum renin were sent. Patient is to have outpatient follow up w/ Dr. Serrano to check results.    Patient had history of CAD, with 1 stent. Echo from 02/2022 showed normal ejection fraction and grade 1 diastolic dysfunction. Patient's home meds Aspirin, plavix, and atorvastatin were continued.    Patient had history of anxiety and depression. Home ativan and sertraline were continued.    Patient is stable for discharge. Patient has been advised to follow up as outpatient. Case has been discussed with the attending.

## 2022-06-29 NOTE — DISCHARGE NOTE PROVIDER - CARE PROVIDER_API CALL
Lori Serrano)  Internal Medicine  [address] [place] [address]  Phone: [phone]  Fax: [phone]  Established Patient  Follow Up Time: 2 weeks

## 2022-06-29 NOTE — PROGRESS NOTE ADULT - PROBLEM SELECTOR PLAN 1
Pt p/w headaches  Home meds = amlodipine 10mg, clonidine 0.1mg BID, lisinopril 10mg  Vitals Hypertensive at 241/115mmHg  EKG showed NSR  Trops negative  CT head showed No acute intracranial hemorrhage, mass effect, or evidence of acute vascular territorial infarction  - States he averages ~140/80 at home  - Currently, put on amlodipine 10mg, clonidine 0.1mg BID, lisinopril 10->40mg  - TSH 0.64  - R/o secondary causes of HTN  - Nephro Dr. Serrano consulted Pt p/w headaches  Home meds = amlodipine 10mg, clonidine 0.1mg BID, lisinopril 10mg  Vitals Hypertensive at 241/115mmHg  EKG showed NSR  Trops negative  CT head showed No acute intracranial hemorrhage, mass effect, or evidence of acute vascular territorial infarction  - States he averages ~140/80 at home  - Currently, put on amlodipine 10mg, clonidine 0.1mg BID, lisinopril 10->40mg  - BP is better controlled on this regimen  - TSH 0.64  - R/o secondary causes of HTN  - Nephro Dr. Serrano consulted

## 2022-06-29 NOTE — PROGRESS NOTE ADULT - ATTENDING COMMENTS
Patient is a 66 year old male with PMH of CAD s/p stent in March 2020, GERD, Anxiety, Depression, HTN, HLD with PSH of Anal fistula repair, s/p cholecystectomy and Left Hip Arthoplasty p/w c/o occipital and frontal headache x1 day. Patient states he was seen here at  ED last night for high blood pressure and had labs and CT head done thar was negative and was discharged home early morning. He says he went home and went to bed but began to feel head pressure and he checked his blood pressure again and found it to be high and thus came back to ED. He reports having the following medications, Amlodipine 10mg, Lisinopril 20mg, and Clonidine 0.1mg / 1 tablet. Denies blurry vision, chest pain, lightheadedness, fever, and vomiting.   Vital Signs Last 24 Hrs  T(C): 36.8 (26 Jun 2022 20:28), Max: 37.2 (26 Jun 2022 07:32)  T(F): 98.2 (26 Jun 2022 20:28), Max: 98.9 (26 Jun 2022 07:32)  HR: 71 (26 Jun 2022 20:28) (66 - 80)  BP: 157/80 (26 Jun 2022 20:28) (144/64 - 226/112)  BP(mean): --  RR: 17 (26 Jun 2022 20:28) (15 - 18)  SpO2: 97% (26 Jun 2022 20:28) (93% - 100%)    AAox3, NAD  chest b/l CTA, RRR   abd soft, nt, nd  no FND                          12.6   6.52  )-----------( 261      ( 26 Jun 2022 06:05 )             37.4   06-26    140  |  106  |  17  ----------------------------<  101<H>  4.2   |  27  |  0.84    Ca    9.5      26 Jun 2022 06:04  Phos  3.0     06-26  Mg     2.7     06-26    TPro  7.7  /  Alb  4.1  /  TBili  0.7  /  DBili  x   /  AST  17  /  ALT  19  /  AlkPhos  81  06-26      Assessment and plan: 66 year old male with PMH of CAD s/p stent in March 2020, GERD, Anxiety, Depression, HTN, HLD with PSH of Anal fistula repair, s/p cholecystectomy and Left Hip Arthoplasty p/w c/o occipital and frontal headache x1 day 2/2 uncontrolled BP, admitted for HTN urgency.    IMP:   Hypertensive urgency, improved with treatment  CAD s/p stent 2020  GERD  Anxiety/ depression  - p/w uncontrolled BP, headache.  - negative CT head, no FND. trop neg.    Plan:   - admitted to tele unit  - was given amlodipine, clonidine 0.1 mg in ED.   - will resume home antihypertensive regimen, inc amlodipine 10mg , clonidine 0.1 mg tid, and Lisinopril 10 mg.  - monitor vitals.  - last echo in feb 2022, preserved EF w/ grade IDD.  - resume home medications for CAD. GERD and anxiety.
Patient seen and examined. Case discussed with Dr. Pa. Patient reports feeling well at this time. Denies any headache, blurry vision, CP, SOB, palpitations, or dizziness. BP, however, continues to run high with sBP up to 172/85. Patient's lisinopril was increased to 20mg yesterday without obvious improvement in the patient's BP. Will continue home clonidine 0.1mg po BID and amlodipine 10mg po qdaily. Will uptitrate lisinopril to 40mg po qdaily. Appreciate renal recommendations and assistance with work-up of secondary causes of hypertension. Cortisol this afternoon is low at 4.8 but was drawn late at 12:15 and not the recommended 8am so unable to interpret. In addition, would expect hypotension and not hypertension with adrenal insufficiency. Follow-up urine and plasma metanephrines, renin, and aldosterone. Remaining care as noted above.
Patient is a 66 year old male with PMH of CAD s/p stent in March 2020, GERD, Anxiety, Depression, HTN, HLD with PSH of Anal fistula repair, s/p cholecystectomy and Left Hip Arthoplasty p/w c/o occipital and frontal headache x1 day. Patient states he was seen here at  ED last night for high blood pressure and had labs and CT head done thar was negative and was discharged home early morning. He says he went home and went to bed but began to feel head pressure and he checked his blood pressure again and found it to be high and thus came back to ED. He reports having the following medications, Amlodipine 10mg, Lisinopril 20mg, and Clonidine 0.1mg / 1 tablet. Denies blurry vision, chest pain, lightheadedness, fever, and vomiting.   Alert, cooperative man  Vital Signs Last 24 Hrs  T(C): 36.6 (27 Jun 2022 15:24), Max: 37 (27 Jun 2022 11:07)  T(F): 97.8 (27 Jun 2022 15:24), Max: 98.6 (27 Jun 2022 11:07)  HR: 80 (27 Jun 2022 15:24) (60 - 80)  BP: 154/86 (27 Jun 2022 15:24) (132/81 - 171/850)  BP(mean): --  RR: 19 (27 Jun 2022 15:24) (17 - 19)  SpO2: 95% (27 Jun 2022 15:24) (95% - 99%)  Lungs, clear  Cor, RRR  abdomen, soft  Neurological, intact                        12.9   6.11  )-----------( 264      ( 27 Jun 2022 08:17 )             38.5   06-27    141  |  108  |  17  ----------------------------<  116<H>  3.9   |  24  |  1.00    Ca    9.4      27 Jun 2022 08:17  Phos  3.2     06-27  Mg     2.5     06-27    TPro  7.5  /  Alb  3.9  /  TBili  0.6  /  DBili  x   /  AST  18  /  ALT  22  /  AlkPhos  85  06-27      Assessment and plan: 66 year old male with PMH of CAD s/p stent in March 2020, GERD, Anxiety, Depression, HTN, HLD with PSH of Anal fistula repair, s/p cholecystectomy and Left Hip Arthoplasty p/w c/o occipital and frontal headache x1 day 2/2 uncontrolled BP, admitted for HTN urgency.    IMP:         Hypertensive urgency , better, but not adequately controlled.  Secondary hypertension is a concern.  Fluticasone and albuterol may be       exacerbating   hypertension.   Prefer to avoid clonidine, as withholding may cause rebound.         CAD s/p stent 2020        GERD        Anxiety/ depression    Plan:       Nephrology consultation, Dr. Serrano      w/u for secondary hypertension
Patient seen and examined. Case discussed with Dr. Pa. The patient reports feeling well today and denies headache, blurry vision, CP, SOB, palpitations. He is interested in going home today. BP is much better controlled with the current regimen of lisinopril 40mg daily, clonidine 0.1mg po BID, and amlodipine 10mg daily. SBP is running mainly in the 140's-150's at this time. Patient amenable to following up with his PCP next week for BP check and also to have a repeat BMP performed to monitor his Cr and K as his lisinopril dose has been increased from 10mg to 40mg. Medically stable for dc home at this time.

## 2022-07-07 LAB
METANEPH 24H UR-MRATE: 133 UG/24 HR — SIGNIFICANT CHANGE UP (ref 58–276)
METANEPHRINE, PL: 28.5 PG/ML — SIGNIFICANT CHANGE UP (ref 0–88)
METANEPHS 24H UR-MCNC: 53 UG/L — SIGNIFICANT CHANGE UP
NORMETANEPHRINE 24H UR-MRATE: 288 UG/24 HR — SIGNIFICANT CHANGE UP (ref 156–729)
NORMETANEPHRINE, PL: 48.2 PG/ML — SIGNIFICANT CHANGE UP (ref 0–285.2)
NORMETANEPHRINE.: 115 UG/L — SIGNIFICANT CHANGE UP

## 2022-07-09 LAB — RENIN PLAS-CCNC: 0.22 NG/ML/HR — SIGNIFICANT CHANGE UP (ref 0.17–5.38)

## 2022-08-24 ENCOUNTER — EMERGENCY (EMERGENCY)
Facility: HOSPITAL | Age: 67
LOS: 1 days | Discharge: HOME | End: 2022-08-24
Attending: EMERGENCY MEDICINE
Payer: MEDICARE

## 2022-08-24 VITALS
TEMPERATURE: 98 F | SYSTOLIC BLOOD PRESSURE: 179 MMHG | OXYGEN SATURATION: 98 % | RESPIRATION RATE: 18 BRPM | DIASTOLIC BLOOD PRESSURE: 88 MMHG | HEART RATE: 66 BPM

## 2022-08-24 VITALS
TEMPERATURE: 98 F | SYSTOLIC BLOOD PRESSURE: 169 MMHG | OXYGEN SATURATION: 97 % | HEART RATE: 68 BPM | RESPIRATION RATE: 16 BRPM | HEIGHT: 68 IN | DIASTOLIC BLOOD PRESSURE: 92 MMHG

## 2022-08-24 DIAGNOSIS — K60.3 ANAL FISTULA: Chronic | ICD-10-CM

## 2022-08-24 DIAGNOSIS — Z96.642 PRESENCE OF LEFT ARTIFICIAL HIP JOINT: Chronic | ICD-10-CM

## 2022-08-24 DIAGNOSIS — Z98.890 OTHER SPECIFIED POSTPROCEDURAL STATES: Chronic | ICD-10-CM

## 2022-08-24 LAB
ANION GAP SERPL CALC-SCNC: 9 MMOL/L — SIGNIFICANT CHANGE UP (ref 5–17)
BUN SERPL-MCNC: 22 MG/DL — HIGH (ref 7–18)
CALCIUM SERPL-MCNC: 8.9 MG/DL — SIGNIFICANT CHANGE UP (ref 8.4–10.5)
CHLORIDE SERPL-SCNC: 105 MMOL/L — SIGNIFICANT CHANGE UP (ref 96–108)
CO2 SERPL-SCNC: 26 MMOL/L — SIGNIFICANT CHANGE UP (ref 22–31)
CREAT SERPL-MCNC: 0.98 MG/DL — SIGNIFICANT CHANGE UP (ref 0.5–1.3)
EGFR: 85 ML/MIN/1.73M2 — SIGNIFICANT CHANGE UP
GLUCOSE SERPL-MCNC: 118 MG/DL — HIGH (ref 70–99)
HCT VFR BLD CALC: 33.9 % — LOW (ref 39–50)
HGB BLD-MCNC: 11.5 G/DL — LOW (ref 13–17)
MCHC RBC-ENTMCNC: 29.8 PG — SIGNIFICANT CHANGE UP (ref 27–34)
MCHC RBC-ENTMCNC: 33.9 GM/DL — SIGNIFICANT CHANGE UP (ref 32–36)
MCV RBC AUTO: 87.8 FL — SIGNIFICANT CHANGE UP (ref 80–100)
NRBC # BLD: 0 /100 WBCS — SIGNIFICANT CHANGE UP (ref 0–0)
PLATELET # BLD AUTO: 244 K/UL — SIGNIFICANT CHANGE UP (ref 150–400)
POTASSIUM SERPL-MCNC: 3.8 MMOL/L — SIGNIFICANT CHANGE UP (ref 3.5–5.3)
POTASSIUM SERPL-SCNC: 3.8 MMOL/L — SIGNIFICANT CHANGE UP (ref 3.5–5.3)
RBC # BLD: 3.86 M/UL — LOW (ref 4.2–5.8)
RBC # FLD: 13.4 % — SIGNIFICANT CHANGE UP (ref 10.3–14.5)
SODIUM SERPL-SCNC: 140 MMOL/L — SIGNIFICANT CHANGE UP (ref 135–145)
TROPONIN I, HIGH SENSITIVITY RESULT: 5.6 NG/L — SIGNIFICANT CHANGE UP
WBC # BLD: 7.98 K/UL — SIGNIFICANT CHANGE UP (ref 3.8–10.5)
WBC # FLD AUTO: 7.98 K/UL — SIGNIFICANT CHANGE UP (ref 3.8–10.5)

## 2022-08-24 PROCEDURE — 84484 ASSAY OF TROPONIN QUANT: CPT

## 2022-08-24 PROCEDURE — 99285 EMERGENCY DEPT VISIT HI MDM: CPT

## 2022-08-24 PROCEDURE — 93005 ELECTROCARDIOGRAM TRACING: CPT

## 2022-08-24 PROCEDURE — 99283 EMERGENCY DEPT VISIT LOW MDM: CPT

## 2022-08-24 PROCEDURE — 80048 BASIC METABOLIC PNL TOTAL CA: CPT

## 2022-08-24 PROCEDURE — 36415 COLL VENOUS BLD VENIPUNCTURE: CPT

## 2022-08-24 PROCEDURE — 85027 COMPLETE CBC AUTOMATED: CPT

## 2022-08-24 RX ADMIN — Medication 0.1 MILLIGRAM(S): at 03:33

## 2022-08-24 NOTE — ED PROVIDER NOTE - OBJECTIVE STATEMENT
67 year old male with PMHx of hypertension (currently on four antihypertensive medications) presenting to the ED with complaints of elevated blood pressure at home tonight. Patient states that when he initially checked his blood pressure it was shown to be 190/120 due to which he decided to visit the ED. Patient endorses, however, that when he measured his pressure using a new machine it showed a reading of 160/90. Patient otherwise denies any chest pain, shortness of breath, abdominal pain, nausea, or vomiting. NKDA.

## 2022-08-24 NOTE — ED ADULT TRIAGE NOTE - CHIEF COMPLAINT QUOTE
patient states " My blood pressure is up , I take blood pressure medicine , labetalol 100mg 30 minutes ago "  Denies any pain, denies discomfort.

## 2022-08-24 NOTE — ED PROVIDER NOTE - PATIENT PORTAL LINK FT
You can access the FollowMyHealth Patient Portal offered by [facility] by registering at the following website: [email or web address]. By joining [facility]’s FollowMyHealth portal, you will also be able to view your health information using other applications (apps) compatible with our system.

## 2022-09-07 ENCOUNTER — APPOINTMENT (OUTPATIENT)
Dept: CARDIOLOGY | Facility: CLINIC | Age: 67
End: 2022-09-07

## 2022-09-07 VITALS — WEIGHT: 217 LBS | BODY MASS INDEX: 32.99 KG/M2

## 2022-09-07 VITALS
TEMPERATURE: 98.1 F | BODY MASS INDEX: 32.99 KG/M2 | DIASTOLIC BLOOD PRESSURE: 86 MMHG | OXYGEN SATURATION: 97 % | HEIGHT: 68 IN | HEART RATE: 63 BPM | SYSTOLIC BLOOD PRESSURE: 135 MMHG

## 2022-09-07 DIAGNOSIS — R94.39 ABNORMAL RESULT OF OTHER CARDIOVASCULAR FUNCTION STUDY: ICD-10-CM

## 2022-09-07 PROCEDURE — 99214 OFFICE O/P EST MOD 30 MIN: CPT

## 2022-09-07 PROCEDURE — 93000 ELECTROCARDIOGRAM COMPLETE: CPT

## 2022-09-11 PROBLEM — R94.39 POSITIVE CARDIAC STRESS TEST: Status: ACTIVE | Noted: 2020-01-28

## 2023-01-02 ENCOUNTER — EMERGENCY (EMERGENCY)
Facility: HOSPITAL | Age: 68
LOS: 1 days | Discharge: HOME | End: 2023-01-02
Attending: EMERGENCY MEDICINE
Payer: MEDICARE

## 2023-01-02 VITALS
OXYGEN SATURATION: 99 % | HEART RATE: 72 BPM | DIASTOLIC BLOOD PRESSURE: 87 MMHG | RESPIRATION RATE: 16 BRPM | SYSTOLIC BLOOD PRESSURE: 168 MMHG | WEIGHT: 220.02 LBS | HEIGHT: 68 IN | TEMPERATURE: 98 F

## 2023-01-02 VITALS
HEART RATE: 78 BPM | TEMPERATURE: 98 F | SYSTOLIC BLOOD PRESSURE: 127 MMHG | OXYGEN SATURATION: 97 % | DIASTOLIC BLOOD PRESSURE: 83 MMHG | RESPIRATION RATE: 16 BRPM

## 2023-01-02 DIAGNOSIS — Z98.890 OTHER SPECIFIED POSTPROCEDURAL STATES: Chronic | ICD-10-CM

## 2023-01-02 DIAGNOSIS — Z96.642 PRESENCE OF LEFT ARTIFICIAL HIP JOINT: Chronic | ICD-10-CM

## 2023-01-02 DIAGNOSIS — K60.3 ANAL FISTULA: Chronic | ICD-10-CM

## 2023-01-02 LAB
ALBUMIN SERPL ELPH-MCNC: 4.2 G/DL — SIGNIFICANT CHANGE UP (ref 3.5–5)
ALP SERPL-CCNC: 86 U/L — SIGNIFICANT CHANGE UP (ref 40–120)
ALT FLD-CCNC: 23 U/L DA — SIGNIFICANT CHANGE UP (ref 10–60)
ANION GAP SERPL CALC-SCNC: 7 MMOL/L — SIGNIFICANT CHANGE UP (ref 5–17)
AST SERPL-CCNC: 14 U/L — SIGNIFICANT CHANGE UP (ref 10–40)
BASOPHILS # BLD AUTO: 0.04 K/UL — SIGNIFICANT CHANGE UP (ref 0–0.2)
BASOPHILS NFR BLD AUTO: 0.7 % — SIGNIFICANT CHANGE UP (ref 0–2)
BILIRUB SERPL-MCNC: 0.5 MG/DL — SIGNIFICANT CHANGE UP (ref 0.2–1.2)
BUN SERPL-MCNC: 17 MG/DL — SIGNIFICANT CHANGE UP (ref 7–18)
CALCIUM SERPL-MCNC: 9.1 MG/DL — SIGNIFICANT CHANGE UP (ref 8.4–10.5)
CHLORIDE SERPL-SCNC: 107 MMOL/L — SIGNIFICANT CHANGE UP (ref 96–108)
CO2 SERPL-SCNC: 25 MMOL/L — SIGNIFICANT CHANGE UP (ref 22–31)
CREAT SERPL-MCNC: 1.1 MG/DL — SIGNIFICANT CHANGE UP (ref 0.5–1.3)
EGFR: 74 ML/MIN/1.73M2 — SIGNIFICANT CHANGE UP
EOSINOPHIL # BLD AUTO: 0.11 K/UL — SIGNIFICANT CHANGE UP (ref 0–0.5)
EOSINOPHIL NFR BLD AUTO: 1.9 % — SIGNIFICANT CHANGE UP (ref 0–6)
GLUCOSE SERPL-MCNC: 155 MG/DL — HIGH (ref 70–99)
HCT VFR BLD CALC: 37.6 % — LOW (ref 39–50)
HGB BLD-MCNC: 12.5 G/DL — LOW (ref 13–17)
IMM GRANULOCYTES NFR BLD AUTO: 0.3 % — SIGNIFICANT CHANGE UP (ref 0–0.9)
LYMPHOCYTES # BLD AUTO: 1.56 K/UL — SIGNIFICANT CHANGE UP (ref 1–3.3)
LYMPHOCYTES # BLD AUTO: 26.8 % — SIGNIFICANT CHANGE UP (ref 13–44)
MAGNESIUM SERPL-MCNC: 2.3 MG/DL — SIGNIFICANT CHANGE UP (ref 1.6–2.6)
MCHC RBC-ENTMCNC: 29.3 PG — SIGNIFICANT CHANGE UP (ref 27–34)
MCHC RBC-ENTMCNC: 33.2 GM/DL — SIGNIFICANT CHANGE UP (ref 32–36)
MCV RBC AUTO: 88.1 FL — SIGNIFICANT CHANGE UP (ref 80–100)
MONOCYTES # BLD AUTO: 0.42 K/UL — SIGNIFICANT CHANGE UP (ref 0–0.9)
MONOCYTES NFR BLD AUTO: 7.2 % — SIGNIFICANT CHANGE UP (ref 2–14)
NEUTROPHILS # BLD AUTO: 3.67 K/UL — SIGNIFICANT CHANGE UP (ref 1.8–7.4)
NEUTROPHILS NFR BLD AUTO: 63.1 % — SIGNIFICANT CHANGE UP (ref 43–77)
NRBC # BLD: 0 /100 WBCS — SIGNIFICANT CHANGE UP (ref 0–0)
PLATELET # BLD AUTO: 249 K/UL — SIGNIFICANT CHANGE UP (ref 150–400)
POTASSIUM SERPL-MCNC: 4.4 MMOL/L — SIGNIFICANT CHANGE UP (ref 3.5–5.3)
POTASSIUM SERPL-SCNC: 4.4 MMOL/L — SIGNIFICANT CHANGE UP (ref 3.5–5.3)
PROT SERPL-MCNC: 7.8 G/DL — SIGNIFICANT CHANGE UP (ref 6–8.3)
RBC # BLD: 4.27 M/UL — SIGNIFICANT CHANGE UP (ref 4.2–5.8)
RBC # FLD: 13.2 % — SIGNIFICANT CHANGE UP (ref 10.3–14.5)
SODIUM SERPL-SCNC: 139 MMOL/L — SIGNIFICANT CHANGE UP (ref 135–145)
TROPONIN I, HIGH SENSITIVITY RESULT: 6 NG/L — SIGNIFICANT CHANGE UP
WBC # BLD: 5.82 K/UL — SIGNIFICANT CHANGE UP (ref 3.8–10.5)
WBC # FLD AUTO: 5.82 K/UL — SIGNIFICANT CHANGE UP (ref 3.8–10.5)

## 2023-01-02 PROCEDURE — 99284 EMERGENCY DEPT VISIT MOD MDM: CPT

## 2023-01-02 PROCEDURE — 83735 ASSAY OF MAGNESIUM: CPT

## 2023-01-02 PROCEDURE — 84484 ASSAY OF TROPONIN QUANT: CPT

## 2023-01-02 PROCEDURE — 71046 X-RAY EXAM CHEST 2 VIEWS: CPT | Mod: 26

## 2023-01-02 PROCEDURE — 93005 ELECTROCARDIOGRAM TRACING: CPT

## 2023-01-02 PROCEDURE — 99285 EMERGENCY DEPT VISIT HI MDM: CPT | Mod: 25

## 2023-01-02 PROCEDURE — 36415 COLL VENOUS BLD VENIPUNCTURE: CPT

## 2023-01-02 PROCEDURE — 71046 X-RAY EXAM CHEST 2 VIEWS: CPT

## 2023-01-02 PROCEDURE — 85025 COMPLETE CBC W/AUTO DIFF WBC: CPT

## 2023-01-02 PROCEDURE — 80053 COMPREHEN METABOLIC PANEL: CPT

## 2023-01-02 NOTE — ED PROVIDER NOTE - PATIENT PORTAL LINK FT
You can access the FollowMyHealth Patient Portal offered by [facility] by registering at the following website: [email or web address]. By joining [facility]’s FollowMyHealth portal, you will also be able to view your health information using other applications (apps) compatible with our system.

## 2023-01-02 NOTE — ED ADULT NURSE NOTE - SUICIDE SCREENING QUESTION 2
Per Stormy Madrigal, to redo Renal Panel to recheck K+     Syeda Eastman RN, BSN   Nephrology RN Care Coordinator   [facility]      No

## 2023-01-02 NOTE — ED PROVIDER NOTE - CARDIAC, MLM
No reproducible chest wall pain. Normal rate, regular rhythm.  Heart sounds S1, S2.  No murmurs, rubs or gallops.

## 2023-01-02 NOTE — ED PROVIDER NOTE - OBJECTIVE STATEMENT
67 year old male with PMHx of hypertension, hyperlipidemia, GERD, and gall stones is presenting to the ED with chief complaint of left sided pinching pain. Patient reports that he work as a  and the symptom developed last night. The pain didn't radiate. No nausea, vomiting, or shortness of breath. He felt the pain again this morning and was concerned. He wanted to make sure that his heart is ok. Patient reports that his pain is 2/10 in intensity. No chest pain but has the pinching feeling.

## 2023-01-02 NOTE — ED PROVIDER NOTE - MUSCULOSKELETAL, MLM
No reproducible chest wall pain. Spine appears normal, range of motion is not limited, no muscle or joint tenderness

## 2023-01-02 NOTE — ED ADULT NURSE NOTE - OBJECTIVE STATEMENT
Pt c/o chest pain and high blood pressure x last night. EKG completed. No acute distress noted, no SOB noted. No

## 2023-01-02 NOTE — ED PROVIDER NOTE - PROGRESS NOTE DETAILS
labs and imaging negative.  pt reassessed and denying chest pain.  Pt given copy of all results.  Will dc

## 2023-01-02 NOTE — ED ADULT NURSE NOTE - HOW MANY DRINKS CONTAINING ALCOHOL DO YOU HAVE ON A TYPICAL DAY WHEN YOU ARE DRINKING?
Patient : Tiffanie Cameron Age: 34 year old Sex: female   MRN: [id number] Encounter Date: 2/21/2021      History     Chief Complaint   Patient presents with   • Abdominal Pain   • Back Pain     HPI  Abdominal pain.  Patient with history of Chaim-en-Y gastric bypass in September of 2020. Has follow-up with surgeon in March.  Patient reports for the past week or so, has been having intermittent left-sided abdominal pain.  Pain is described as a stabbing sensation.  Does radiate towards her back.  There is associated nausea without vomiting.  Patient reports normal bowel movements, last of which was today.  Denies any fever, chills, sweats, chest pain, shortness of breath.  No vaginal bleeding or discharge.  No dysuria or hematuria.  Has taken Tylenol without significant relief her symptoms.      Allergies   Allergen Reactions   • Celebrex [Celecoxib] VOMITING and NAUSEA   • Iodine   (Environmental Or Med) RASH     Patient states allergy to topical iodine   • Morphine Other (See Comments)     burns       Current Discharge Medication List      Prior to Admission Medications    Details   losartan (COZAAR) 25 MG tablet Take 1 tablet by mouth daily.  Qty: 30 tablet, Refills: 0      omeprazole (PRILOSEC) 20 MG capsule Take 20 mg by mouth daily.      rosuvastatin (CRESTOR) 20 MG tablet Take 20 mg by mouth daily.      fluoxetine (PROZAC) 40 MG capsule Take 40 mg by mouth daily.      predniSONE (DELTASONE) 20 MG tablet Take 2 tablets by mouth daily.  Qty: 10 tablet, Refills: 0      cetirizine (ZYRTEC) 10 MG tablet Take 1 tablet by mouth daily.  Qty: 14 tablet, Refills: 0      labetalol (NORMODYNE) 200 MG tablet Take 1 tablet by mouth 2 times daily.  Qty: 60 tablet, Refills: 0      Prenatal Vit-Fe Fumarate-FA (MULTIVITAMIN & MINERAL W/FOLIC ACID- PRENATAL) 27-1 MG Tab Take 1 tablet by mouth nightly.       calcium carbonate (TUMS) 500 MG chewable tablet Chew 1-2 tablets by mouth 3 times daily as needed for Heartburn.         New  Prescriptions    Details   oxyCODONE, IMM REL, (Roxicodone) 5 MG immediate release tablet Take 1 tablet by mouth every 6 hours as needed for Pain.  Qty: 14 tablet, Refills: 0      tamsulosin (Flomax) 0.4 MG Cap Take 1 capsule by mouth daily for 7 days.  Qty: 7 capsule, Refills: 0      ondansetron (Zofran ODT) 4 MG disintegrating tablet Place 1 tablet onto the tongue every 6 hours.  Qty: 10 tablet, Refills: 0             Past Medical History:   Diagnosis Date   • Bladder disorder    • Elevated cholesterol    • Essential (primary) hypertension    • Kidney stone    • Polycystic ovarian disease        Past Surgical History:   Procedure Laterality Date   • APPENDECTOMY     • ESOPHAGOGASTRODUODENOSCOPY      x2   • HAND/FINGER SURGERY UNLISTED Left 03/04/2018    Unspecified hand/finger procedure, trigger finger repair left thumb   • KNEE SCOPE,DIAGNOSTIC      both knees   • LITHOTRIPSY - GEN'L CLASS     • NASAL SEPTUM SURGERY     • OVARIAN CYST REMOVAL         Family History   Problem Relation Age of Onset   • Hypertension Mother    • High cholesterol Mother    • Diabetes Mother    • Arthritis Mother    • Diabetes Paternal Grandmother    • Diabetes Paternal Grandfather    • Arthritis Maternal Grandmother        Social History     Tobacco Use   • Smoking status: Never Smoker   • Smokeless tobacco: Never Used   Substance Use Topics   • Alcohol use: No     Alcohol/week: 0.0 standard drinks     Frequency: Never     Drinks per session: 1 or 2     Binge frequency: Never   • Drug use: No       E-cigarette/Vaping   • E-Cigarette/Vaping Use Never Used      E-Cigarette/Vaping Substances & Devices       Review of Systems   Constitutional: Negative for chills and fever.   HENT: Negative for congestion.    Eyes: Negative for photophobia, redness and visual disturbance.   Respiratory: Negative for shortness of breath.    Cardiovascular: Negative for chest pain.   Gastrointestinal: Positive for abdominal pain and nausea. Negative for  vomiting.   Genitourinary: Negative for dysuria, hematuria, vaginal bleeding and vaginal discharge.   Musculoskeletal: Negative for myalgias.   Skin: Negative for pallor and wound.   Allergic/Immunologic: Negative for immunocompromised state.   Neurological: Negative for dizziness and headaches.   Hematological: Does not bruise/bleed easily.   Psychiatric/Behavioral: Negative for agitation and confusion.       Physical Exam     ED Triage Vitals   ED Triage Vitals Group      Temp 02/21/21 1025 98.3 °F (36.8 °C)      Heart Rate 02/21/21 1023 71      Resp 02/21/21 1023 20      BP 02/21/21 1023 (!) 213/121      SpO2 02/21/21 1025 100 %      EtCO2 mmHg --       Height --       Weight 02/21/21 1025 206 lb 9.6 oz (93.7 kg)      Weight Scale Used 02/21/21 1025 Standing scale      BMI (Calculated) --       IBW/kg (Calculated) --        Physical Exam   Constitutional: She is oriented to person, place, and time. No distress.   HENT:   Head: Normocephalic and atraumatic.   Eyes: Conjunctivae and EOM are normal.   Neck: Normal range of motion.   Cardiovascular: Normal rate, regular rhythm, normal heart sounds and intact distal pulses.   Pulmonary/Chest: Effort normal and breath sounds normal. No respiratory distress.   Abdominal: Soft. She exhibits no distension. There is no abdominal tenderness.   Musculoskeletal: Normal range of motion.         General: No edema.   Neurological: She is alert and oriented to person, place, and time.   Skin: Skin is warm and dry. She is not diaphoretic. No erythema.   Psychiatric: She has a normal mood and affect. Her behavior is normal. Judgment and thought content normal.   Nursing note and vitals reviewed.      ED Course     Procedures    Lab Results     Results for orders placed or performed during the hospital encounter of 02/21/21   Urinalysis & Reflex Microscopy With Culture If Indicated   Result Value Ref Range    COLOR, URINALYSIS Yellow     APPEARANCE, URINALYSIS Hazy     GLUCOSE,  URINALYSIS Negative Negative mg/dL    BILIRUBIN, URINALYSIS Negative Negative    KETONES, URINALYSIS Negative Negative mg/dL    SPECIFIC GRAVITY, URINALYSIS 1.019 1.005 - 1.030    OCCULT BLOOD, URINALYSIS Large (A) Negative    PH, URINALYSIS 5.0 5.0 - 7.0    PROTEIN, URINALYSIS Negative Negative mg/dL    UROBILINOGEN, URINALYSIS 4.0 (A) 0.2, 1.0 mg/dL    NITRITE, URINALYSIS Negative Negative    LEUKOCYTE ESTERASE, URINALYSIS Negative Negative    SQUAMOUS EPITHELIAL, URINALYSIS 1 to 5 None Seen, 1 to 5 /hpf    ERYTHROCYTES, URINALYSIS >100 (A) None Seen, 1 to 2 /hpf    LEUKOCYTES, URINALYSIS 1 to 5 None Seen, 1 to 5 /hpf    BACTERIA, URINALYSIS None Seen None Seen /hpf    HYALINE CASTS, URINALYSIS None Seen None Seen, 1 to 5 /lpf    CALCIUM OXALATE CRYSTALS Present     MUCUS Present    Comprehensive Metabolic Panel   Result Value Ref Range    Fasting Status      Sodium 140 135 - 145 mmol/L    Potassium 3.9 3.4 - 5.1 mmol/L    Chloride 107 98 - 107 mmol/L    Carbon Dioxide 24 21 - 32 mmol/L    Anion Gap 13 10 - 20 mmol/L    Glucose 99 65 - 99 mg/dL    BUN 15 6 - 20 mg/dL    Creatinine 0.75 0.51 - 0.95 mg/dL    Glomerular Filtration Rate >90 >90 mL/min/1.73m2    BUN/ Creatinine Ratio 20 7 - 25    Calcium 9.4 8.4 - 10.2 mg/dL    Bilirubin, Total 0.6 0.2 - 1.0 mg/dL    GOT/AST 21 <=37 Units/L    GPT/ALT 26 <64 Units/L    Alkaline Phosphatase 150 (H) 45 - 117 Units/L    Albumin 3.8 3.6 - 5.1 g/dL    Protein, Total 7.4 6.4 - 8.2 g/dL    Globulin 3.6 2.0 - 4.0 g/dL    A/G Ratio 1.1 1.0 - 2.4   Troponin I Ultra Sensitive   Result Value Ref Range    Troponin I, Ultra Sensitive <0.02 <=0.04 ng/mL   Lactic Acid, Venous   Result Value Ref Range    Lactate, Venous 1.5 0.0 - 2.0 mmol/L   Pregnancy Test, Urine   Result Value Ref Range    Pregnancy, Urine Negative Negative   CBC with Automated Differential (performable only)   Result Value Ref Range    WBC 9.2 4.2 - 11.0 K/mcL    RBC 5.69 (H) 4.00 - 5.20 mil/mcL    HGB 16.2 (H) 12.0  - 15.5 g/dL    HCT 48.5 (H) 36.0 - 46.5 %    MCV 85.2 78.0 - 100.0 fl    MCH 28.5 26.0 - 34.0 pg    MCHC 33.4 32.0 - 36.5 g/dL    RDW-CV 12.8 11.0 - 15.0 %    RDW-SD 39.3 39.0 - 50.0 fL     140 - 450 K/mcL    NRBC 0 <=0 /100 WBC    Neutrophil, Percent 63 %    Lymphocytes, Percent 28 %    Mono, Percent 6 %    Eosinophils, Percent 2 %    Basophils, Percent 1 %    Immature Granulocytes 0 %    Absolute Neutrophils 5.9 1.8 - 7.7 K/mcL    Absolute Lymphocytes 2.5 1.0 - 4.8 K/mcL    Absolute Monocytes 0.5 0.3 - 0.9 K/mcL    Absolute Eosinophils  0.2 0.0 - 0.5 K/mcL    Absolute Basophils 0.1 0.0 - 0.3 K/mcL    Absolute Immmature Granulocytes 0.0 0.0 - 0.2 K/mcL       EKG Results     EKG Interpretation  EKG, my read: normal sinus rhythm without ST elevation or new T wave changes. Intervals within normal limits. Awaiting final cardiology review      EKG tracing interpreted by ED physician    Radiology Results     Imaging Results          CT ABDOMEN PELVIS W CONTRAST (Final result)  Result time 02/21/21 13:10:54    Final result                 Impression:    IMPRESSION:  4 mm obstructing left UPJ stone causing mild left hydronephrosis.  Small left ovarian cyst.  Old surgical changes right lower quadrant and GE junction.    Signed by: Arnoldo Watson               Narrative:    INTERPRETATION LOCATION: [facility]    CT scan abdomen and pelvis with IV contrast February 21, 2021.    COMPARISON: Imaging 2014.    CLINICAL INDICATION: Hematuria. Pain.    FINDINGS:  Individualized dose optimization technique was used for the  procedure performed.. The patient is injected with 100 mL  Omnipaque 350 contrast material.    Acute process is not seen involving the liver, gallbladder,  spleen, pancreas, adrenal glands.    Right kidney is normal. Surgical changes are noted along the GE  junction. Stomach is nondistended.    Left kidney show surrounding inflammation. The renal parenchyma  enhancement is indistinct. There is a  calcification at the UPJ  causing obstruction. Stone measures 4 mm the. Thereafter the  ureter becomes decompressed.  Urinary bladder is unremarkable.    Uterus is not enlarged. Phleboliths are noted throughout the  pelvis. Small left ovarian cyst is present measuring 2.3 cm.    Surgical changes are noted in the right lower quadrant. Negative  for bowel obstruction or abscess.    Spinal alignment is intact.                                ED Medication Orders (From admission, onward)    Ordered Start     Status Ordering Provider    02/21/21 1108 02/21/21 1109  morphine injection 4 mg  ONCE      Last MAR action: Given GERALD EASTON    02/21/21 1033 02/21/21 1034  nitroGLYcerin topical (NITRO-BID) 2 % ointment 1 inch  ONCE      Last MAR action: Given GERALD EASTON    02/21/21 1033 02/21/21 1034  fentaNYL (SUBLIMAZE) injection 50 mcg  ONCE      Last MAR action: Given GERALD EASTON    02/21/21 1033 02/21/21 1034  sodium chloride (NORMAL SALINE) 0.9 % bolus 1,000 mL  ONCE      Last MAR action: Completed GERALD EASTON    02/21/21 1033 02/21/21 1034  ondansetron (ZOFRAN) injection 4 mg  ONCE      Last MAR action: Given GERALD EASTON          ED Course as of Feb 21 1321   Sun Feb 21, 2021   1137 BLOOD(!): Large [JW]      ED Course User Index  [JW] Gerald Easton MD       [facility]  ED Course:  Tiffanie Cameron is a 34 year old female who presents to the ED with abdominal pain.  History of a gastric bypass. No significant ttp in abdomen.  Will give IV fluids, pain medications and nausea medications.  CT scan of her abdomen with oral and IV contrast.    Lab work does show large amount of blood in the urine, but no sign of infection.  CT scan is showing a 4 mm obstructing stone.  This is likely the cause of patient's pain.  On reassessment, she reports her pain is significantly improved.  Recommend following up Urology. I have discussed with the patient the results of tests, differential diagnosis, and warning  signs and symptoms that should prompt immediate return.  The patient states understanding of these instructions and agrees to the follow-up plan provided. Pt educated on reasons to return to the emergency department and agrees to return for any concerns, problems or complications. Pt agrees to and understands plan. The patient is discharged in stable condition.      Terrance Easton MD  Emergency Medicine Staff Physician  10:37 AM    Clinical Impression     ED Diagnosis   1. Kidney stone  SERVICE TO UROLOGY       Disposition        Discharge 2/21/2021  1:16 PM  Tiffanie Cameron discharge to home/self care.                         Terrance Easton MD  02/21/21 0804     1 or 2

## 2023-01-12 ENCOUNTER — RX RENEWAL (OUTPATIENT)
Age: 68
End: 2023-01-12

## 2023-01-12 RX ORDER — ATORVASTATIN CALCIUM 40 MG/1
40 TABLET, FILM COATED ORAL
Qty: 90 | Refills: 3 | Status: ACTIVE | COMMUNITY
Start: 2020-11-20 | End: 1900-01-01

## 2023-04-12 ENCOUNTER — APPOINTMENT (OUTPATIENT)
Dept: CARDIOLOGY | Facility: CLINIC | Age: 68
End: 2023-04-12
Payer: MEDICARE

## 2023-04-12 VITALS
BODY MASS INDEX: 35.31 KG/M2 | OXYGEN SATURATION: 96 % | DIASTOLIC BLOOD PRESSURE: 84 MMHG | WEIGHT: 233 LBS | HEIGHT: 68 IN | HEART RATE: 74 BPM | SYSTOLIC BLOOD PRESSURE: 156 MMHG

## 2023-04-12 PROCEDURE — 99214 OFFICE O/P EST MOD 30 MIN: CPT | Mod: 25

## 2023-04-12 PROCEDURE — 93000 ELECTROCARDIOGRAM COMPLETE: CPT

## 2023-04-26 ENCOUNTER — APPOINTMENT (OUTPATIENT)
Dept: DERMATOLOGY | Facility: CLINIC | Age: 68
End: 2023-04-26

## 2023-05-20 NOTE — ED ADULT NURSE NOTE - NS ED NURSE DC INFO COMPLEXITY
(Aleksander Coma Scale) 4= 13-15 Simple: Patient demonstrates quick and easy understanding/Verbalized Understanding

## 2023-08-07 ENCOUNTER — APPOINTMENT (OUTPATIENT)
Dept: CARDIOLOGY | Facility: CLINIC | Age: 68
End: 2023-08-07

## 2023-10-02 ENCOUNTER — APPOINTMENT (OUTPATIENT)
Dept: CARDIOLOGY | Facility: CLINIC | Age: 68
End: 2023-10-02
Payer: MEDICARE

## 2023-10-02 ENCOUNTER — NON-APPOINTMENT (OUTPATIENT)
Age: 68
End: 2023-10-02

## 2023-10-02 VITALS
WEIGHT: 222 LBS | SYSTOLIC BLOOD PRESSURE: 152 MMHG | HEART RATE: 71 BPM | BODY MASS INDEX: 33.65 KG/M2 | OXYGEN SATURATION: 98 % | DIASTOLIC BLOOD PRESSURE: 79 MMHG | HEIGHT: 68 IN | TEMPERATURE: 97.5 F

## 2023-10-02 DIAGNOSIS — I25.10 ATHEROSCLEROTIC HEART DISEASE OF NATIVE CORONARY ARTERY W/OUT ANGINA PECTORIS: ICD-10-CM

## 2023-10-02 DIAGNOSIS — R06.09 OTHER FORMS OF DYSPNEA: ICD-10-CM

## 2023-10-02 DIAGNOSIS — E78.2 MIXED HYPERLIPIDEMIA: ICD-10-CM

## 2023-10-02 DIAGNOSIS — R07.89 OTHER CHEST PAIN: ICD-10-CM

## 2023-10-02 DIAGNOSIS — Z13.6 ENCOUNTER FOR SCREENING FOR CARDIOVASCULAR DISORDERS: ICD-10-CM

## 2023-10-02 DIAGNOSIS — I10 ESSENTIAL (PRIMARY) HYPERTENSION: ICD-10-CM

## 2023-10-02 DIAGNOSIS — Z00.00 ENCOUNTER FOR GENERAL ADULT MEDICAL EXAMINATION W/OUT ABNORMAL FINDINGS: ICD-10-CM

## 2023-10-02 DIAGNOSIS — R00.2 PALPITATIONS: ICD-10-CM

## 2023-10-02 PROCEDURE — 99214 OFFICE O/P EST MOD 30 MIN: CPT | Mod: 25

## 2023-10-02 PROCEDURE — 93000 ELECTROCARDIOGRAM COMPLETE: CPT

## 2023-10-02 RX ORDER — CLOPIDOGREL BISULFATE 75 MG/1
75 TABLET, FILM COATED ORAL
Qty: 90 | Refills: 3 | Status: ACTIVE | COMMUNITY
Start: 2020-03-25 | End: 1900-01-01

## 2023-10-02 RX ORDER — LISINOPRIL 20 MG/1
20 TABLET ORAL DAILY
Qty: 90 | Refills: 3 | Status: ACTIVE | COMMUNITY
Start: 2023-10-02

## 2023-10-02 RX ORDER — LOSARTAN POTASSIUM 50 MG/1
50 TABLET, FILM COATED ORAL
Qty: 180 | Refills: 3 | Status: DISCONTINUED | COMMUNITY
Start: 2021-09-16 | End: 2023-10-02

## 2023-10-19 ENCOUNTER — OUTPATIENT (OUTPATIENT)
Dept: OUTPATIENT SERVICES | Facility: HOSPITAL | Age: 68
LOS: 1 days | End: 2023-10-19
Payer: COMMERCIAL

## 2023-10-19 DIAGNOSIS — K60.3 ANAL FISTULA: Chronic | ICD-10-CM

## 2023-10-19 DIAGNOSIS — Z98.890 OTHER SPECIFIED POSTPROCEDURAL STATES: Chronic | ICD-10-CM

## 2023-10-19 DIAGNOSIS — Z96.642 PRESENCE OF LEFT ARTIFICIAL HIP JOINT: Chronic | ICD-10-CM

## 2023-10-19 DIAGNOSIS — R07.89 OTHER CHEST PAIN: ICD-10-CM

## 2023-10-19 PROCEDURE — 93306 TTE W/DOPPLER COMPLETE: CPT

## 2023-10-19 PROCEDURE — 78452 HT MUSCLE IMAGE SPECT MULT: CPT | Mod: 26,MH

## 2023-10-19 PROCEDURE — 78452 HT MUSCLE IMAGE SPECT MULT: CPT | Mod: MH

## 2023-10-19 PROCEDURE — 93017 CV STRESS TEST TRACING ONLY: CPT

## 2023-10-19 PROCEDURE — 93018 CV STRESS TEST I&R ONLY: CPT

## 2023-10-19 PROCEDURE — A9502: CPT

## 2023-10-19 PROCEDURE — 93306 TTE W/DOPPLER COMPLETE: CPT | Mod: 26

## 2023-10-19 PROCEDURE — 93016 CV STRESS TEST SUPVJ ONLY: CPT

## 2023-11-01 ENCOUNTER — APPOINTMENT (OUTPATIENT)
Dept: DERMATOLOGY | Facility: CLINIC | Age: 68
End: 2023-11-01

## 2023-11-22 NOTE — ED ADULT TRIAGE NOTE - CCCP TRG CHIEF CMPLNT
epigastric pain/abdominal pain
ESME Loza:Labs reviewed, H&H at baseline.  INR 3.8.  X-ray images reviewed–no subq air on tibia-fibula.   Patient was started on antibiotics.  Wound care was consulted, they have not seen patient yet in the ED.  Spoke with hospitalist Dr. Cowart and will admit for cellulitis of the leg with failed outpatient antibiotics. Pts son is a bedside, he was updated on results, his mom due for her tramadol, will order a dose

## 2023-12-31 PROBLEM — Z23 NEED FOR VACCINATION WITH 13-POLYVALENT PNEUMOCOCCAL CONJUGATE VACCINE: Status: ACTIVE | Noted: 2019-10-17

## 2024-03-27 ENCOUNTER — EMERGENCY (EMERGENCY)
Facility: HOSPITAL | Age: 69
LOS: 1 days | Discharge: HOME | End: 2024-03-27
Attending: STUDENT IN AN ORGANIZED HEALTH CARE EDUCATION/TRAINING PROGRAM
Payer: COMMERCIAL

## 2024-03-27 VITALS
OXYGEN SATURATION: 99 % | HEIGHT: 68 IN | DIASTOLIC BLOOD PRESSURE: 79 MMHG | TEMPERATURE: 98 F | SYSTOLIC BLOOD PRESSURE: 150 MMHG | RESPIRATION RATE: 16 BRPM | WEIGHT: 237.88 LBS | HEART RATE: 77 BPM

## 2024-03-27 DIAGNOSIS — Z96.642 PRESENCE OF LEFT ARTIFICIAL HIP JOINT: Chronic | ICD-10-CM

## 2024-03-27 DIAGNOSIS — K60.3 ANAL FISTULA: Chronic | ICD-10-CM

## 2024-03-27 DIAGNOSIS — Z98.890 OTHER SPECIFIED POSTPROCEDURAL STATES: Chronic | ICD-10-CM

## 2024-03-27 PROCEDURE — 73110 X-RAY EXAM OF WRIST: CPT | Mod: 26,RT

## 2024-03-27 PROCEDURE — 73110 X-RAY EXAM OF WRIST: CPT

## 2024-03-27 PROCEDURE — 29125 APPL SHORT ARM SPLINT STATIC: CPT | Mod: RT

## 2024-03-27 PROCEDURE — 99283 EMERGENCY DEPT VISIT LOW MDM: CPT | Mod: 25

## 2024-03-27 PROCEDURE — 99284 EMERGENCY DEPT VISIT MOD MDM: CPT | Mod: 25

## 2024-03-27 NOTE — ED PROVIDER NOTE - OBJECTIVE STATEMENT
68-year-old male, history of hypertension, presents for evaluation of right wrist pain status post injury 2 days ago.  Slipped and fell on his wrist while in the shower.  No head injury or LOC.  Since then reports pain localized to the distal radial area/base of the thumb.  Denies any numbness, tingling or focal weakness.  Denies any other complaints.  Took ibuprofen with partial improvement of symptoms.

## 2024-03-27 NOTE — ED PROVIDER NOTE - NSFOLLOWUPCLINICS_GEN_ALL_ED_FT
[facility]  Orthopedics  [address] [place] [address]  Phone: [phone]  Fax: [phone]

## 2024-03-27 NOTE — ED PROVIDER NOTE - NSFOLLOWUPINSTRUCTIONS_ED_ALL_ED_FT
Follow up with the orthopedist in 5-7 days.  Wear the wrist splint until you see the orthopedist.    Tylenol 1000 mg orally every 6 hours as needed for pain/fever (max 4000 mg in 24 hours)     If you experience any new or worsening symptoms or if you are concerned you can always come back to the emergency for a re-evaluation.

## 2024-03-27 NOTE — ED ADULT NURSE NOTE - OBJECTIVE STATEMENT
Patient c/o right wrist pain s/p fall x2 days ago. Pt denies any chest pain, LOC.    Slipped and fell in the shower 2 days ago with right wrist pain

## 2024-03-27 NOTE — ED ADULT NURSE NOTE - MODE OF DISCHARGE
Bedside and Verbal shift change report given to Corina (oncoming nurse) by Angelika Montemayor (offgoing nurse). Report included the following information SBAR, Kardex, Intake/Output, MAR, Recent Results and Cardiac Rhythm NSR. Zone Phone:   [phone] Significant changes during shift:  none Patient Information Maira Luisa Patel 
80 y.o. 
11/13/2018  1:31 AM by Kaveh Acuna MD. Maria Luisa Patel was admitted from Home 
 
Problem List 
 
Patient Active Problem List  
 Diagnosis Date Noted  Hyponatremia 11/15/2018  Vision changes 11/15/2018  Hyperkalemia 11/13/2018  Bilateral carotid artery stenosis 11/13/2018  Dizziness and giddiness 11/13/2018  Ascites due to chronic alcoholic hepatitis 18/41/8862  Colon AV malformation 10/03/2014  Diverticulosis of colon 10/03/2014  Colon polyps 10/03/2014  S/P inguinal hernia repair 08/04/2014  Cirrhosis, alcoholic ([address] 11/58/0098  S/P hip replacement 03/07/2013 Past Medical History:  
Diagnosis Date  Arrhythmia   
 intermittent tachycardia  Breast lump   
 bilateral 6-8 weeks  Congestive heart failure ([address]  Hypertension  Liver disease   
 alcoholic cirrhosis  Psychiatric disorder   
 anxiety and depression Core Measures: CVA: Yes Yes CHF:No No 
PNA:No No 
 
Activity Status: OOB to Chair No 
Ambulated this shift Yes Bed Rest No 
 
LINES AND DRAINS: 
 
PIV 20ga VISHAL Peritoneal drain to LLQ abd 
 
DVT prophylaxis: DVT prophylaxis Med- No 
DVT prophylaxis SCD or NEMESIO- Refused Patient Safety: 
 
Falls Score Total Score: 1 Safety Level_______ Bed Alarm On? No 
Sitter? No 
 
Plan for upcoming shift: Possible discharge Discharge Plan: Yes TBD Active Consults: 
IP CONSULT TO HOSPITALIST 
IP CONSULT TO NEUROLOGY 
IP CONSULT TO NEPHROLOGY Ambulatory

## 2024-03-27 NOTE — ED PROVIDER NOTE - PATIENT PORTAL LINK FT
You can access the FollowMyHealth Patient Portal offered by [facility] [place] by registering at the following website: [email or web address]. By joining [employer]’s FollowMyHealth portal, you will also be able to view your health information using other applications (apps) compatible with our system.

## 2024-03-27 NOTE — ED ADULT NURSE NOTE - CAS EDN DISCHARGE ASSESSMENT
PMI and heart sounds localize heart on left side of chest/Murmurs absent Alert and oriented to person, place and time

## 2024-03-27 NOTE — ED PROVIDER NOTE - CLINICAL SUMMARY MEDICAL DECISION MAKING FREE TEXT BOX
68-year-old male, presents status post mechanical fall with right wrist injury.  Neurovascularly intact.  Positive snuffbox tenderness.  X-ray negative for fracture.  Thumb spica splint applied and patient advised to follow-up with orthopedist in 5-7 days.  Discussed red flags to go back to the hospital.

## 2024-03-27 NOTE — ED PROVIDER NOTE - PHYSICAL EXAMINATION
Right wrist full range of motion with pain.  No edema or skin break.  All fingers full range of motion.  Cap refill less than 2 seconds.  Positive snuffbox tenderness and positive distal radial tenderness.  Right elbow and shoulder full range of motion without swelling or tenderness.  Neck supple and full range of motion.  No spinal/paraspinal tenderness.

## 2024-04-25 NOTE — ED ADULT NURSE NOTE - DATE OF FIRST COVID-19 BOOSTER
Suture Removal, Care After  This sheet gives you information about how to care for yourself after your procedure. Your health care provider may also give you more specific instructions. If you have problems or questions, contact your health care provider.    What can I expect after the procedure?  After your stitches (sutures) are removed, it is common to have:    Some discomfort and swelling in the area.  Slight redness in the area.    Follow these instructions at home:  If you have a bandage:     Wash your hands with soap and water before you change your bandage (dressing). If soap and water are not available, use hand .  Change your dressing as told by your health care provider. If your dressing becomes wet or dirty, or develops a bad smell, change it as soon as possible.  If your dressing sticks to your skin, soak it in warm water to loosen it.  Wound care     Check your wound every day for signs of infection. Check for:    More redness, swelling, or pain.  Fluid or blood.  Warmth.  Pus or a bad smell.    Wash your hands with soap and water before and after touching your wound.  Apply cream or ointment only as directed by your health care provider. If you are using cream or ointment, wash the area with soap and water 2 times a day to remove all the cream or ointment. Rinse off the soap and pat the area dry with a clean towel.  If you have skin glue or adhesive strips on your wound, leave these closures in place. They may need to stay in place for 2 weeks or longer. If adhesive strip edges start to loosen and curl up, you may trim the loose edges. Do not remove adhesive strips completely unless your health care provider tells you to do that.  Keep the wound area dry and clean. Do not take baths, swim, or use a hot tub until your health care provider approves.  Continue to protect the wound from injury.  Do not pick at your wound. Picking can cause an infection.  ImageWhen your wound has completely healed, wear sunscreen over it or cover it with clothing when you are outside. New scars get sunburned easily, which can make scarring worse.  General instructions     Take over-the-counter and prescription medicines only as told by your health care provider.  Keep all follow-up visits as told by your health care provider. This is important.  Contact a health care provider if:  You have redness, swelling, or pain around your wound.  You have fluid or blood coming from your wound.  Your wound feels warm to the touch.  You have pus or a bad smell coming from your wound.  Your wound opens up.  Get help right away if:  You have a fever.  You have redness that is spreading from your wound.  Summary  After your sutures are removed, it is common to have some discomfort and swelling in the area.  Wash your hands with soap and water before you change your bandage (dressing).  Keep the wound area dry and clean. Do not take baths, swim, or use a hot tub until your health care provider approves.  This information is not intended to replace advice given to you by your health care provider. Make sure you discuss any questions you have with your health care provider.
16-Nov-2021

## 2024-05-08 ENCOUNTER — APPOINTMENT (OUTPATIENT)
Age: 69
End: 2024-05-08
Payer: MEDICARE

## 2024-05-08 VITALS
DIASTOLIC BLOOD PRESSURE: 83 MMHG | SYSTOLIC BLOOD PRESSURE: 156 MMHG | HEIGHT: 68 IN | OXYGEN SATURATION: 98 % | WEIGHT: 230 LBS | BODY MASS INDEX: 34.86 KG/M2 | HEART RATE: 73 BPM

## 2024-05-08 DIAGNOSIS — M19.049 PRIMARY OSTEOARTHRITIS, UNSPECIFIED HAND: ICD-10-CM

## 2024-05-08 PROCEDURE — 99204 OFFICE O/P NEW MOD 45 MIN: CPT | Mod: 25

## 2024-05-08 PROCEDURE — 20604 DRAIN/INJ JOINT/BURSA W/US: CPT

## 2024-05-08 NOTE — PROCEDURE
[de-identified] : Ultrasound Guided Injection   Indication: Ensure placement within the first CMC joint utilizing the Calibrus portable ultrasound machine, the Linear 10mm 19-4 MHz transducer, sterile ultrasound gel, ultrasound guidance with the probe in long axis to the joint, utilizing an out-of-plane approach, was used for the following injection: Injection: RIGHT first CMC joint.  Indication: First CMC joint arthritis.  A discussion was had with the patient regarding this procedure and all questions were answered. All risks, benefits and alternatives were discussed. These included but were not limited to bleeding, infection, and allergic reaction. A timeout was performed prior to the procedure to ensure proper side.  Chlorhexidine was used to sterilize the skin overlying the first CMC joint. A 25-gauge 1 inch needle was used to inject 0.5cc of 0.5% Ropivacaine, 0.5cc 1% Lidocaine, 1cc of 40mg/mL Depo-Medrol into the joint from a posterior approach. A sterile bandage was then applied. The patient tolerated the procedure well and there were no complications.

## 2024-05-08 NOTE — HISTORY OF PRESENT ILLNESS
[de-identified] : PRINCE DELVALLE is a 68 year old male presenting with acute right-sided thumb pain.  He is right-hand dominant.  He states he worked as a  for many years but he is now retired.  The pain was so intense recently he went to the emergency room where x-rays were done that were negative for any fracture but did show advanced arthritis at the first CMC joint.  He denies any recent injury or fall.  States the pain started about 2 weeks ago and has not gotten better.  He says he saw somebody who gave him a brace however it does not reach over the thumb.  He is here today for further evaluation.

## 2024-05-08 NOTE — DISCUSSION/SUMMARY
[de-identified] : PRINCE DELVALLE is a 68 year old RHD male with PMH of well controlled HTN presenting with acute right thumb pain consistent with severe degenerative arthritic changes seen on x-ray of the first CMC joint.  Discussed treatment options with the patient including referral to hand surgeon for potential fusion versus trial of conservative measures including thumb spica brace medications and injection.  Patient states he would like to try with conservative measures first.  Discussed performing an injection today would delay any potential surgery needed by at least 3 months and patient verbalized understanding.  Overall recommend the followin.  First CMC joint CSI performed today as above 2.  Thumb spica splint prescribed and given to the patient 3.  Patient will use topical medications and oral OTC NSAIDs as needed 4.  Patient will follow-up in 1 month can consider prescription NSAID versus hand therapy versus surgical referral

## 2024-05-08 NOTE — PHYSICAL EXAM
[de-identified] : Hand/wrist/finger (right)  Inspection  Swelling: none  Ecchymosis: none  Scissoring: none    Palpation  Tenderness: Severe Location: First CMC joint  Wrist Flexion  Normal.  Wrist Extension  Normal.  Wrist Radial Deviation  Normal.  Wrist Ulnar Deviation  Normal.   Wrist/Pinch/ Motor Strength  Wrist Extension: 5/5 Wrist Flexion: 5/5  : 5/5   Finger Flexion  Normal.  Finger Extension  Normal.  Sensory index  Normal   Special Tests   Finkelsteins: normal  Tinnels: normal  Phalens: normal  Pinch : normal  [de-identified] : XR of right wrist Date: 3/27/2024   Views: 3 views Performed at [facility]: Yes Impression: Significant advanced degenerative arthritic changes at the first CMC joint.  No fracture noted.  These images were personally reviewed with original findings documented as above.

## 2024-06-10 ENCOUNTER — APPOINTMENT (OUTPATIENT)
Age: 69
End: 2024-06-10

## 2024-07-01 NOTE — ED PROVIDER NOTE - GASTROINTESTINAL [+], MLM
How Severe Is Your Skin Lesion?: moderate Has Your Skin Lesion Been Treated?: not been treated Is This A New Presentation, Or A Follow-Up?: Skin Lesion Additional History: Patient presents today for a pink spot on the forehead.\\n\\nWould like something to help clear up her acne on the chin. fecal incontinence

## 2024-07-15 ENCOUNTER — EMERGENCY (EMERGENCY)
Facility: HOSPITAL | Age: 69
LOS: 1 days | Discharge: HOME | End: 2024-07-15
Attending: STUDENT IN AN ORGANIZED HEALTH CARE EDUCATION/TRAINING PROGRAM
Payer: COMMERCIAL

## 2024-07-15 VITALS
TEMPERATURE: 98 F | RESPIRATION RATE: 18 BRPM | HEART RATE: 67 BPM | OXYGEN SATURATION: 97 % | DIASTOLIC BLOOD PRESSURE: 77 MMHG | SYSTOLIC BLOOD PRESSURE: 145 MMHG

## 2024-07-15 VITALS
TEMPERATURE: 100 F | HEIGHT: 68 IN | DIASTOLIC BLOOD PRESSURE: 86 MMHG | WEIGHT: 220.46 LBS | HEART RATE: 86 BPM | SYSTOLIC BLOOD PRESSURE: 169 MMHG | RESPIRATION RATE: 18 BRPM | OXYGEN SATURATION: 97 %

## 2024-07-15 DIAGNOSIS — K60.3 ANAL FISTULA: Chronic | ICD-10-CM

## 2024-07-15 DIAGNOSIS — Z96.642 PRESENCE OF LEFT ARTIFICIAL HIP JOINT: Chronic | ICD-10-CM

## 2024-07-15 DIAGNOSIS — Z98.890 OTHER SPECIFIED POSTPROCEDURAL STATES: Chronic | ICD-10-CM

## 2024-07-15 LAB
ALBUMIN SERPL ELPH-MCNC: 4.1 G/DL — SIGNIFICANT CHANGE UP (ref 3.5–5)
ALP SERPL-CCNC: 98 U/L — SIGNIFICANT CHANGE UP (ref 40–120)
ALT FLD-CCNC: 24 U/L DA — SIGNIFICANT CHANGE UP (ref 10–60)
ANION GAP SERPL CALC-SCNC: 6 MMOL/L — SIGNIFICANT CHANGE UP (ref 5–17)
AST SERPL-CCNC: 20 U/L — SIGNIFICANT CHANGE UP (ref 10–40)
BASOPHILS # BLD AUTO: 0.02 K/UL — SIGNIFICANT CHANGE UP (ref 0–0.2)
BASOPHILS NFR BLD AUTO: 0.4 % — SIGNIFICANT CHANGE UP (ref 0–2)
BILIRUB SERPL-MCNC: 0.6 MG/DL — SIGNIFICANT CHANGE UP (ref 0.2–1.2)
BUN SERPL-MCNC: 10 MG/DL — SIGNIFICANT CHANGE UP (ref 7–18)
CALCIUM SERPL-MCNC: 9.3 MG/DL — SIGNIFICANT CHANGE UP (ref 8.4–10.5)
CHLORIDE SERPL-SCNC: 105 MMOL/L — SIGNIFICANT CHANGE UP (ref 96–108)
CO2 SERPL-SCNC: 27 MMOL/L — SIGNIFICANT CHANGE UP (ref 22–31)
CREAT SERPL-MCNC: 0.93 MG/DL — SIGNIFICANT CHANGE UP (ref 0.5–1.3)
EGFR: 89 ML/MIN/1.73M2 — SIGNIFICANT CHANGE UP
EOSINOPHIL # BLD AUTO: 0.16 K/UL — SIGNIFICANT CHANGE UP (ref 0–0.5)
EOSINOPHIL NFR BLD AUTO: 3.4 % — SIGNIFICANT CHANGE UP (ref 0–6)
FLUAV AG NPH QL: SIGNIFICANT CHANGE UP
FLUBV AG NPH QL: SIGNIFICANT CHANGE UP
GLUCOSE SERPL-MCNC: 99 MG/DL — SIGNIFICANT CHANGE UP (ref 70–99)
HCT VFR BLD CALC: 33.1 % — LOW (ref 39–50)
HGB BLD-MCNC: 11.4 G/DL — LOW (ref 13–17)
IMM GRANULOCYTES NFR BLD AUTO: 0.2 % — SIGNIFICANT CHANGE UP (ref 0–0.9)
LYMPHOCYTES # BLD AUTO: 1.25 K/UL — SIGNIFICANT CHANGE UP (ref 1–3.3)
LYMPHOCYTES # BLD AUTO: 26.7 % — SIGNIFICANT CHANGE UP (ref 13–44)
MCHC RBC-ENTMCNC: 30.4 PG — SIGNIFICANT CHANGE UP (ref 27–34)
MCHC RBC-ENTMCNC: 34.4 GM/DL — SIGNIFICANT CHANGE UP (ref 32–36)
MCV RBC AUTO: 88.3 FL — SIGNIFICANT CHANGE UP (ref 80–100)
MONOCYTES # BLD AUTO: 0.4 K/UL — SIGNIFICANT CHANGE UP (ref 0–0.9)
MONOCYTES NFR BLD AUTO: 8.5 % — SIGNIFICANT CHANGE UP (ref 2–14)
NEUTROPHILS # BLD AUTO: 2.84 K/UL — SIGNIFICANT CHANGE UP (ref 1.8–7.4)
NEUTROPHILS NFR BLD AUTO: 60.8 % — SIGNIFICANT CHANGE UP (ref 43–77)
NRBC # BLD: 0 /100 WBCS — SIGNIFICANT CHANGE UP (ref 0–0)
PLATELET # BLD AUTO: 228 K/UL — SIGNIFICANT CHANGE UP (ref 150–400)
POTASSIUM SERPL-MCNC: 3.5 MMOL/L — SIGNIFICANT CHANGE UP (ref 3.5–5.3)
POTASSIUM SERPL-SCNC: 3.5 MMOL/L — SIGNIFICANT CHANGE UP (ref 3.5–5.3)
PROT SERPL-MCNC: 7.5 G/DL — SIGNIFICANT CHANGE UP (ref 6–8.3)
RBC # BLD: 3.75 M/UL — LOW (ref 4.2–5.8)
RBC # FLD: 13.4 % — SIGNIFICANT CHANGE UP (ref 10.3–14.5)
SARS-COV-2 RNA SPEC QL NAA+PROBE: SIGNIFICANT CHANGE UP
SODIUM SERPL-SCNC: 138 MMOL/L — SIGNIFICANT CHANGE UP (ref 135–145)
WBC # BLD: 4.68 K/UL — SIGNIFICANT CHANGE UP (ref 3.8–10.5)
WBC # FLD AUTO: 4.68 K/UL — SIGNIFICANT CHANGE UP (ref 3.8–10.5)

## 2024-07-15 PROCEDURE — 99283 EMERGENCY DEPT VISIT LOW MDM: CPT | Mod: 25

## 2024-07-15 PROCEDURE — 96361 HYDRATE IV INFUSION ADD-ON: CPT

## 2024-07-15 PROCEDURE — 99284 EMERGENCY DEPT VISIT MOD MDM: CPT

## 2024-07-15 PROCEDURE — 85025 COMPLETE CBC W/AUTO DIFF WBC: CPT

## 2024-07-15 PROCEDURE — 36415 COLL VENOUS BLD VENIPUNCTURE: CPT

## 2024-07-15 PROCEDURE — 71046 X-RAY EXAM CHEST 2 VIEWS: CPT

## 2024-07-15 PROCEDURE — 87636 SARSCOV2 & INF A&B AMP PRB: CPT

## 2024-07-15 PROCEDURE — 80053 COMPREHEN METABOLIC PANEL: CPT

## 2024-07-15 PROCEDURE — 96374 THER/PROPH/DIAG INJ IV PUSH: CPT

## 2024-07-15 PROCEDURE — 71046 X-RAY EXAM CHEST 2 VIEWS: CPT | Mod: 26

## 2024-07-15 RX ORDER — BENZONATATE 100 MG/1
1 TABLET ORAL
Qty: 15 | Refills: 0
Start: 2024-07-15

## 2024-07-15 RX ORDER — DEXTROSE MONOHYDRATE AND SODIUM CHLORIDE 5; .3 G/100ML; G/100ML
1000 INJECTION, SOLUTION INTRAVENOUS ONCE
Refills: 0 | Status: COMPLETED | OUTPATIENT
Start: 2024-07-15 | End: 2024-07-15

## 2024-07-15 RX ORDER — ACETAMINOPHEN 325 MG
1000 TABLET ORAL ONCE
Refills: 0 | Status: COMPLETED | OUTPATIENT
Start: 2024-07-15 | End: 2024-07-15

## 2024-07-15 RX ORDER — BENZONATATE 100 MG/1
100 TABLET ORAL ONCE
Refills: 0 | Status: COMPLETED | OUTPATIENT
Start: 2024-07-15 | End: 2024-07-15

## 2024-07-15 RX ADMIN — Medication 400 MILLIGRAM(S): at 16:08

## 2024-07-15 RX ADMIN — DEXTROSE MONOHYDRATE AND SODIUM CHLORIDE 1000 MILLILITER(S): 5; .3 INJECTION, SOLUTION INTRAVENOUS at 17:08

## 2024-07-15 RX ADMIN — Medication 1000 MILLIGRAM(S): at 16:23

## 2024-07-15 RX ADMIN — Medication 1000 MILLIGRAM(S): at 16:38

## 2024-07-15 RX ADMIN — BENZONATATE 100 MILLIGRAM(S): 100 TABLET ORAL at 16:08

## 2024-07-15 RX ADMIN — DEXTROSE MONOHYDRATE AND SODIUM CHLORIDE 1000 MILLILITER(S): 5; .3 INJECTION, SOLUTION INTRAVENOUS at 16:08

## 2024-07-15 NOTE — ED PROVIDER NOTE - CROS ED CARDIOVAS ALL NEG
CARDIOLOGY NOTE      7/15/2024    RE: Kaleb Ladd  (1960)                               TO:  Gisela Tavarez, APRN - CNP (Inactive)            CHIEF COMPLAINT   Kaleb is a 64 y.o. male who was seen today for management of atrial fibrillation                     Here for follow-up              HPI:                   Pt has h/o atrial flutter, hypertension, hyperlipidemia, h/o kidn transplant 1/14seen today for follow-up. Pt has no cardiac complaints      Kaleb Ladd has the following history recorded in care path:  Patient Active Problem List    Diagnosis Date Noted    Immunosuppressed status ([facility]) 03/14/2023    Hypercoagulable state ([facility]) 11/16/2022    Status post ablation of atrial flutter 10/21/2022    Atrial flutter ([facility]) 10/09/2022    Lymphopenia 06/17/2024    Short of breath on exertion 11/06/2023    Acute gastric ulcer with hemorrhage 09/19/2023    Melena 07/12/2023    Acute post-hemorrhagic anemia 07/12/2023    GI bleed 07/09/2023    Hypertension secondary to other renal disorders 06/15/2021    A-fib ([facility])     Grade IV hemorrhoids 03/16/2021    Secondary polycythemia 08/31/2020    Kidney replaced by transplant 07/27/2020    Atrial flutter with rapid ventricular response ([facility]) 02/17/2020    Leukocytosis 05/03/2012    DVT of upper extremity (deep vein thrombosis) ([facility]) 02/16/2012    MRSA cellulitis 02/05/2012    Pure hypercholesterolemia 02/04/2012    Tremor, essential 02/04/2012    Abscess of left leg 02/02/2012    Abdominal pain, other specified site 12/06/2011    Constipation 12/06/2011    Hypotension 12/06/2011    ARF (acute renal failure) ([facility]) 12/06/2011     Current Outpatient Medications   Medication Sig Dispense Refill    pantoprazole (PROTONIX) 40 MG tablet TAKE 1 TABLET DAILY 30 tablet 2    primidone (MYSOLINE) 250 MG tablet       mycophenolate (CELLCEPT) 250 MG capsule Take 3 capsules by mouth 2 times daily      tiZANidine (ZANAFLEX) 4 MG capsule Take 1 capsule by mouth  - - -

## 2024-09-04 ENCOUNTER — NON-APPOINTMENT (OUTPATIENT)
Age: 69
End: 2024-09-04

## 2024-09-04 ENCOUNTER — APPOINTMENT (OUTPATIENT)
Dept: CARDIOLOGY | Facility: CLINIC | Age: 69
End: 2024-09-04
Payer: MEDICARE

## 2024-09-04 VITALS — BODY MASS INDEX: 33.95 KG/M2 | WEIGHT: 224 LBS | HEIGHT: 68 IN

## 2024-09-04 VITALS — DIASTOLIC BLOOD PRESSURE: 79 MMHG | HEART RATE: 74 BPM | OXYGEN SATURATION: 98 % | SYSTOLIC BLOOD PRESSURE: 153 MMHG

## 2024-09-04 DIAGNOSIS — E78.2 MIXED HYPERLIPIDEMIA: ICD-10-CM

## 2024-09-04 DIAGNOSIS — R00.2 PALPITATIONS: ICD-10-CM

## 2024-09-04 DIAGNOSIS — I25.10 ATHEROSCLEROTIC HEART DISEASE OF NATIVE CORONARY ARTERY W/OUT ANGINA PECTORIS: ICD-10-CM

## 2024-09-04 DIAGNOSIS — I10 ESSENTIAL (PRIMARY) HYPERTENSION: ICD-10-CM

## 2024-09-04 DIAGNOSIS — R07.89 OTHER CHEST PAIN: ICD-10-CM

## 2024-09-04 DIAGNOSIS — Z13.6 ENCOUNTER FOR SCREENING FOR CARDIOVASCULAR DISORDERS: ICD-10-CM

## 2024-09-04 DIAGNOSIS — R06.09 OTHER FORMS OF DYSPNEA: ICD-10-CM

## 2024-09-04 DIAGNOSIS — Z01.810 ENCOUNTER FOR PREPROCEDURAL CARDIOVASCULAR EXAMINATION: ICD-10-CM

## 2024-09-04 PROCEDURE — 99214 OFFICE O/P EST MOD 30 MIN: CPT

## 2024-09-04 PROCEDURE — 93000 ELECTROCARDIOGRAM COMPLETE: CPT

## 2024-09-04 PROCEDURE — G2211 COMPLEX E/M VISIT ADD ON: CPT

## 2025-01-29 ENCOUNTER — EMERGENCY (EMERGENCY)
Facility: HOSPITAL | Age: 70
LOS: 1 days | Discharge: HOME | End: 2025-01-29
Attending: EMERGENCY MEDICINE
Payer: COMMERCIAL

## 2025-01-29 VITALS
DIASTOLIC BLOOD PRESSURE: 82 MMHG | WEIGHT: 220.02 LBS | RESPIRATION RATE: 18 BRPM | HEART RATE: 93 BPM | TEMPERATURE: 100 F | SYSTOLIC BLOOD PRESSURE: 169 MMHG | OXYGEN SATURATION: 95 %

## 2025-01-29 DIAGNOSIS — K60.3 ANAL FISTULA: Chronic | ICD-10-CM

## 2025-01-29 DIAGNOSIS — Z98.890 OTHER SPECIFIED POSTPROCEDURAL STATES: Chronic | ICD-10-CM

## 2025-01-29 DIAGNOSIS — Z96.642 PRESENCE OF LEFT ARTIFICIAL HIP JOINT: Chronic | ICD-10-CM

## 2025-01-29 LAB
FLUAV AG NPH QL: DETECTED
FLUBV AG NPH QL: SIGNIFICANT CHANGE UP
RSV RNA NPH QL NAA+NON-PROBE: SIGNIFICANT CHANGE UP
SARS-COV-2 RNA SPEC QL NAA+PROBE: SIGNIFICANT CHANGE UP

## 2025-01-29 PROCEDURE — 87637 SARSCOV2&INF A&B&RSV AMP PRB: CPT

## 2025-01-29 PROCEDURE — 99284 EMERGENCY DEPT VISIT MOD MDM: CPT

## 2025-01-29 PROCEDURE — 99283 EMERGENCY DEPT VISIT LOW MDM: CPT

## 2025-01-29 RX ORDER — ACETAMINOPHEN 500 MG/5ML
650 LIQUID (ML) ORAL ONCE
Refills: 0 | Status: COMPLETED | OUTPATIENT
Start: 2025-01-29 | End: 2025-01-29

## 2025-01-29 RX ORDER — DEXAMETHASONE 0.5 MG/1
10 TABLET ORAL ONCE
Refills: 0 | Status: COMPLETED | OUTPATIENT
Start: 2025-01-29 | End: 2025-01-29

## 2025-01-29 RX ADMIN — Medication 650 MILLIGRAM(S): at 17:18

## 2025-01-29 RX ADMIN — Medication 650 MILLIGRAM(S): at 19:20

## 2025-03-05 ENCOUNTER — NON-APPOINTMENT (OUTPATIENT)
Age: 70
End: 2025-03-05

## 2025-03-05 ENCOUNTER — APPOINTMENT (OUTPATIENT)
Dept: CARDIOLOGY | Facility: CLINIC | Age: 70
End: 2025-03-05
Payer: MEDICARE

## 2025-03-05 VITALS
BODY MASS INDEX: 33.95 KG/M2 | SYSTOLIC BLOOD PRESSURE: 161 MMHG | DIASTOLIC BLOOD PRESSURE: 83 MMHG | OXYGEN SATURATION: 98 % | HEART RATE: 71 BPM | TEMPERATURE: 97.8 F | HEIGHT: 68 IN | WEIGHT: 224 LBS

## 2025-03-05 VITALS — DIASTOLIC BLOOD PRESSURE: 86 MMHG | SYSTOLIC BLOOD PRESSURE: 174 MMHG

## 2025-03-05 DIAGNOSIS — I10 ESSENTIAL (PRIMARY) HYPERTENSION: ICD-10-CM

## 2025-03-05 DIAGNOSIS — R00.2 PALPITATIONS: ICD-10-CM

## 2025-03-05 DIAGNOSIS — R07.89 OTHER CHEST PAIN: ICD-10-CM

## 2025-03-05 DIAGNOSIS — E78.2 MIXED HYPERLIPIDEMIA: ICD-10-CM

## 2025-03-05 DIAGNOSIS — I25.10 ATHEROSCLEROTIC HEART DISEASE OF NATIVE CORONARY ARTERY W/OUT ANGINA PECTORIS: ICD-10-CM

## 2025-03-05 DIAGNOSIS — R06.09 OTHER FORMS OF DYSPNEA: ICD-10-CM

## 2025-03-05 DIAGNOSIS — Z13.6 ENCOUNTER FOR SCREENING FOR CARDIOVASCULAR DISORDERS: ICD-10-CM

## 2025-03-05 DIAGNOSIS — Z01.810 ENCOUNTER FOR PREPROCEDURAL CARDIOVASCULAR EXAMINATION: ICD-10-CM

## 2025-03-05 PROCEDURE — 99214 OFFICE O/P EST MOD 30 MIN: CPT | Mod: 25

## 2025-03-05 PROCEDURE — 93000 ELECTROCARDIOGRAM COMPLETE: CPT

## 2025-08-05 ENCOUNTER — APPOINTMENT (OUTPATIENT)
Dept: DERMATOLOGY | Facility: CLINIC | Age: 70
End: 2025-08-05
Payer: MEDICARE

## 2025-08-05 VITALS — WEIGHT: 220 LBS | BODY MASS INDEX: 33.34 KG/M2 | HEIGHT: 68 IN

## 2025-08-05 DIAGNOSIS — L57.0 ACTINIC KERATOSIS: ICD-10-CM

## 2025-08-05 DIAGNOSIS — Z86.03 PERSONAL HISTORY OF NEOPLASM OF UNCERTAIN BEHAVIOR: ICD-10-CM

## 2025-08-05 DIAGNOSIS — B35.4 TINEA CORPORIS: ICD-10-CM

## 2025-08-05 DIAGNOSIS — Z85.828 PERSONAL HISTORY OF OTHER MALIGNANT NEOPLASM OF SKIN: ICD-10-CM

## 2025-08-05 PROCEDURE — 17003 DESTRUCT PREMALG LES 2-14: CPT

## 2025-08-05 PROCEDURE — 99204 OFFICE O/P NEW MOD 45 MIN: CPT | Mod: 25

## 2025-08-05 PROCEDURE — 17000 DESTRUCT PREMALG LESION: CPT

## 2025-08-05 RX ORDER — FLUCONAZOLE 150 MG/1
150 TABLET ORAL
Qty: 3 | Refills: 0 | Status: ACTIVE | COMMUNITY
Start: 2025-08-05 | End: 1900-01-01

## 2025-08-05 RX ORDER — KETOCONAZOLE 20 MG/G
2 CREAM TOPICAL
Qty: 1 | Refills: 1 | Status: ACTIVE | COMMUNITY
Start: 2025-08-05 | End: 1900-01-01

## 2025-08-11 ENCOUNTER — APPOINTMENT (OUTPATIENT)
Age: 70
End: 2025-08-11
Payer: MEDICARE

## 2025-08-11 VITALS
OXYGEN SATURATION: 96 % | HEART RATE: 75 BPM | TEMPERATURE: 97.4 F | SYSTOLIC BLOOD PRESSURE: 142 MMHG | DIASTOLIC BLOOD PRESSURE: 80 MMHG

## 2025-08-11 DIAGNOSIS — R79.89 OTHER SPECIFIED ABNORMAL FINDINGS OF BLOOD CHEMISTRY: ICD-10-CM

## 2025-08-11 DIAGNOSIS — N52.9 MALE ERECTILE DYSFUNCTION, UNSPECIFIED: ICD-10-CM

## 2025-08-11 PROCEDURE — 99204 OFFICE O/P NEW MOD 45 MIN: CPT

## 2025-08-20 LAB
ALBUMIN SERPL ELPH-MCNC: 4.7 G/DL
ALP BLD-CCNC: 100 U/L
ALT SERPL-CCNC: 18 U/L
ANION GAP SERPL CALC-SCNC: 16 MMOL/L
AST SERPL-CCNC: 20 U/L
BASOPHILS # BLD AUTO: 0.03 K/UL
BASOPHILS NFR BLD AUTO: 0.4 %
BILIRUB SERPL-MCNC: 0.4 MG/DL
BUN SERPL-MCNC: 18 MG/DL
CALCIUM SERPL-MCNC: 9.3 MG/DL
CHLORIDE SERPL-SCNC: 105 MMOL/L
CO2 SERPL-SCNC: 21 MMOL/L
CREAT SERPL-MCNC: 0.83 MG/DL
EGFRCR SERPLBLD CKD-EPI 2021: 95 ML/MIN/1.73M2
EOSINOPHIL # BLD AUTO: 0.16 K/UL
EOSINOPHIL NFR BLD AUTO: 2.4 %
ESTIMATED AVERAGE GLUCOSE: 120 MG/DL
GLUCOSE SERPL-MCNC: 133 MG/DL
HBA1C MFR BLD HPLC: 5.8 %
HCT VFR BLD CALC: 34.6 %
HGB BLD-MCNC: 11.6 G/DL
IMM GRANULOCYTES NFR BLD AUTO: 0.6 %
LH SERPL-ACNC: 6.8 IU/L
LYMPHOCYTES # BLD AUTO: 1.88 K/UL
LYMPHOCYTES NFR BLD AUTO: 28.2 %
MAN DIFF?: NORMAL
MCHC RBC-ENTMCNC: 29.9 PG
MCHC RBC-ENTMCNC: 33.5 G/DL
MCV RBC AUTO: 89.2 FL
MONOCYTES # BLD AUTO: 0.56 K/UL
MONOCYTES NFR BLD AUTO: 8.4 %
NEUTROPHILS # BLD AUTO: 4 K/UL
NEUTROPHILS NFR BLD AUTO: 60 %
PLATELET # BLD AUTO: 265 K/UL
POTASSIUM SERPL-SCNC: 3.8 MMOL/L
PROT SERPL-MCNC: 7 G/DL
PSA SERPL-MCNC: 0.92 NG/ML
RBC # BLD: 3.88 M/UL
RBC # FLD: 13.5 %
SODIUM SERPL-SCNC: 142 MMOL/L
TESTOST SERPL-MCNC: 226 NG/DL
WBC # FLD AUTO: 6.67 K/UL

## 2025-08-23 RX ORDER — TESTOSTERONE 20.25 MG/1.25G
20.25 MG/1.25GM GEL TOPICAL
Qty: 1 | Refills: 0 | Status: ACTIVE | COMMUNITY
Start: 2025-08-20 | End: 1900-01-01

## 2025-09-08 ENCOUNTER — APPOINTMENT (OUTPATIENT)
Dept: DERMATOLOGY | Facility: CLINIC | Age: 70
End: 2025-09-08

## 2025-09-17 ENCOUNTER — NON-APPOINTMENT (OUTPATIENT)
Age: 70
End: 2025-09-17

## 2025-09-17 ENCOUNTER — APPOINTMENT (OUTPATIENT)
Dept: CARDIOLOGY | Facility: CLINIC | Age: 70
End: 2025-09-17
Payer: MEDICARE

## 2025-09-17 VITALS — SYSTOLIC BLOOD PRESSURE: 150 MMHG | DIASTOLIC BLOOD PRESSURE: 82 MMHG

## 2025-09-17 VITALS
BODY MASS INDEX: 34.4 KG/M2 | HEART RATE: 69 BPM | WEIGHT: 227 LBS | DIASTOLIC BLOOD PRESSURE: 81 MMHG | OXYGEN SATURATION: 98 % | SYSTOLIC BLOOD PRESSURE: 157 MMHG | HEIGHT: 68 IN

## 2025-09-17 DIAGNOSIS — R00.2 PALPITATIONS: ICD-10-CM

## 2025-09-17 DIAGNOSIS — R07.89 OTHER CHEST PAIN: ICD-10-CM

## 2025-09-17 DIAGNOSIS — E78.2 MIXED HYPERLIPIDEMIA: ICD-10-CM

## 2025-09-17 DIAGNOSIS — R06.09 OTHER FORMS OF DYSPNEA: ICD-10-CM

## 2025-09-17 DIAGNOSIS — Z13.6 ENCOUNTER FOR SCREENING FOR CARDIOVASCULAR DISORDERS: ICD-10-CM

## 2025-09-17 DIAGNOSIS — I10 ESSENTIAL (PRIMARY) HYPERTENSION: ICD-10-CM

## 2025-09-17 DIAGNOSIS — I25.10 ATHEROSCLEROTIC HEART DISEASE OF NATIVE CORONARY ARTERY W/OUT ANGINA PECTORIS: ICD-10-CM

## 2025-09-17 PROCEDURE — 93000 ELECTROCARDIOGRAM COMPLETE: CPT

## 2025-09-17 PROCEDURE — 99214 OFFICE O/P EST MOD 30 MIN: CPT | Mod: 25
